# Patient Record
Sex: MALE | Race: WHITE | Employment: OTHER | URBAN - METROPOLITAN AREA
[De-identification: names, ages, dates, MRNs, and addresses within clinical notes are randomized per-mention and may not be internally consistent; named-entity substitution may affect disease eponyms.]

---

## 2017-04-08 ENCOUNTER — APPOINTMENT (OUTPATIENT)
Dept: GENERAL RADIOLOGY | Age: 62
End: 2017-04-08
Payer: MEDICARE

## 2017-04-08 ENCOUNTER — HOSPITAL ENCOUNTER (EMERGENCY)
Age: 62
Discharge: HOME | End: 2017-04-08
Attending: EMERGENCY MEDICINE
Payer: MEDICARE

## 2017-04-08 VITALS
HEART RATE: 77 BPM | SYSTOLIC BLOOD PRESSURE: 147 MMHG | WEIGHT: 137 LBS | TEMPERATURE: 97.9 F | DIASTOLIC BLOOD PRESSURE: 86 MMHG | OXYGEN SATURATION: 95 % | RESPIRATION RATE: 18 BRPM | BODY MASS INDEX: 22.11 KG/M2

## 2017-04-08 DIAGNOSIS — K59.00 CONSTIPATION, UNSPECIFIED CONSTIPATION TYPE: ICD-10-CM

## 2017-04-08 DIAGNOSIS — J40 BRONCHITIS: Primary | ICD-10-CM

## 2017-04-08 LAB
ALBUMIN SERPL-MCNC: 3.8 G/DL (ref 3.9–4.9)
ALP BLD-CCNC: 97 U/L (ref 35–104)
ALT SERPL-CCNC: 12 U/L (ref 0–41)
ANION GAP SERPL CALCULATED.3IONS-SCNC: 11 MEQ/L (ref 7–13)
AST SERPL-CCNC: 17 U/L (ref 0–40)
BASOPHILS ABSOLUTE: 0.1 K/UL (ref 0–0.2)
BASOPHILS RELATIVE PERCENT: 0.8 %
BILIRUB SERPL-MCNC: 0.2 MG/DL (ref 0–1.2)
BUN BLDV-MCNC: 7 MG/DL (ref 8–23)
CALCIUM SERPL-MCNC: 8.8 MG/DL (ref 8.6–10.2)
CHLORIDE BLD-SCNC: 93 MEQ/L (ref 98–107)
CO2: 26 MEQ/L (ref 22–29)
CREAT SERPL-MCNC: 0.81 MG/DL (ref 0.7–1.2)
EOSINOPHILS ABSOLUTE: 0.1 K/UL (ref 0–0.7)
EOSINOPHILS RELATIVE PERCENT: 0.7 %
GFR AFRICAN AMERICAN: >60
GFR NON-AFRICAN AMERICAN: >60
GLOBULIN: 2.5 G/DL (ref 2.3–3.5)
GLUCOSE BLD-MCNC: 90 MG/DL (ref 74–109)
HCT VFR BLD CALC: 47.4 % (ref 42–52)
HEMOGLOBIN: 16.4 G/DL (ref 14–18)
LYMPHOCYTES ABSOLUTE: 1.3 K/UL (ref 1–4.8)
LYMPHOCYTES RELATIVE PERCENT: 16.4 %
MCH RBC QN AUTO: 31.1 PG (ref 27–31.3)
MCHC RBC AUTO-ENTMCNC: 34.6 % (ref 33–37)
MCV RBC AUTO: 90 FL (ref 80–100)
MONOCYTES ABSOLUTE: 1.1 K/UL (ref 0.2–0.8)
MONOCYTES RELATIVE PERCENT: 13.3 %
NEUTROPHILS ABSOLUTE: 5.6 K/UL (ref 1.4–6.5)
NEUTROPHILS RELATIVE PERCENT: 68.8 %
PDW BLD-RTO: 13.4 % (ref 11.5–14.5)
PLATELET # BLD: 139 K/UL (ref 130–400)
POTASSIUM SERPL-SCNC: 3.9 MEQ/L (ref 3.5–5.1)
RBC # BLD: 5.26 M/UL (ref 4.7–6.1)
SODIUM BLD-SCNC: 130 MEQ/L (ref 132–144)
TOTAL PROTEIN: 6.3 G/DL (ref 6.4–8.1)
TROPONIN: <0.01 NG/ML (ref 0–0.01)
WBC # BLD: 8.1 K/UL (ref 4.8–10.8)

## 2017-04-08 PROCEDURE — 96372 THER/PROPH/DIAG INJ SC/IM: CPT

## 2017-04-08 PROCEDURE — 36415 COLL VENOUS BLD VENIPUNCTURE: CPT

## 2017-04-08 PROCEDURE — 85025 COMPLETE CBC W/AUTO DIFF WBC: CPT

## 2017-04-08 PROCEDURE — 99285 EMERGENCY DEPT VISIT HI MDM: CPT

## 2017-04-08 PROCEDURE — 93005 ELECTROCARDIOGRAM TRACING: CPT

## 2017-04-08 PROCEDURE — 6370000000 HC RX 637 (ALT 250 FOR IP): Performed by: EMERGENCY MEDICINE

## 2017-04-08 PROCEDURE — 6360000002 HC RX W HCPCS: Performed by: EMERGENCY MEDICINE

## 2017-04-08 PROCEDURE — 71020 XR CHEST STANDARD TWO VW: CPT

## 2017-04-08 PROCEDURE — 94640 AIRWAY INHALATION TREATMENT: CPT

## 2017-04-08 PROCEDURE — 84484 ASSAY OF TROPONIN QUANT: CPT

## 2017-04-08 PROCEDURE — 80053 COMPREHEN METABOLIC PANEL: CPT

## 2017-04-08 PROCEDURE — 87040 BLOOD CULTURE FOR BACTERIA: CPT

## 2017-04-08 RX ORDER — DICYCLOMINE HCL 20 MG
20 TABLET ORAL EVERY 6 HOURS
Qty: 20 TABLET | Refills: 0 | Status: SHIPPED | OUTPATIENT
Start: 2017-04-08 | End: 2019-12-31

## 2017-04-08 RX ORDER — AZITHROMYCIN 250 MG/1
TABLET, FILM COATED ORAL
Qty: 1 PACKET | Refills: 0 | Status: SHIPPED | OUTPATIENT
Start: 2017-04-08 | End: 2017-04-18

## 2017-04-08 RX ORDER — DICYCLOMINE HYDROCHLORIDE 10 MG/ML
20 INJECTION INTRAMUSCULAR ONCE
Status: COMPLETED | OUTPATIENT
Start: 2017-04-08 | End: 2017-04-08

## 2017-04-08 RX ORDER — ALBUTEROL SULFATE 90 UG/1
1-2 AEROSOL, METERED RESPIRATORY (INHALATION) EVERY 4 HOURS PRN
Qty: 1 INHALER | Refills: 0 | Status: ON HOLD | OUTPATIENT
Start: 2017-04-08 | End: 2021-04-13

## 2017-04-08 RX ORDER — PREDNISONE 20 MG/1
60 TABLET ORAL ONCE
Status: COMPLETED | OUTPATIENT
Start: 2017-04-08 | End: 2017-04-08

## 2017-04-08 RX ADMIN — MAGESIUM CITRATE 296 ML: 1.75 LIQUID ORAL at 14:11

## 2017-04-08 RX ADMIN — PREDNISONE 60 MG: 20 TABLET ORAL at 13:06

## 2017-04-08 RX ADMIN — DICYCLOMINE HYDROCHLORIDE 20 MG: 20 INJECTION, SOLUTION INTRAMUSCULAR at 14:11

## 2017-04-08 RX ADMIN — ALBUTEROL SULFATE 2.5 MG: 2.5 SOLUTION RESPIRATORY (INHALATION) at 12:31

## 2017-04-08 ASSESSMENT — PAIN DESCRIPTION - LOCATION: LOCATION: ABDOMEN;CHEST

## 2017-04-08 ASSESSMENT — ENCOUNTER SYMPTOMS
EYE PAIN: 0
VOMITING: 0
STRIDOR: 0
ANAL BLEEDING: 0
DIARRHEA: 0
COUGH: 0
EYE REDNESS: 0
EYE ITCHING: 0
BACK PAIN: 0
ABDOMINAL DISTENTION: 0
WHEEZING: 0
TROUBLE SWALLOWING: 0
CHEST TIGHTNESS: 1
RHINORRHEA: 0
SORE THROAT: 0
CHOKING: 0
PHOTOPHOBIA: 0
CONSTIPATION: 1
VOICE CHANGE: 0
NAUSEA: 0
SINUS PRESSURE: 0
SHORTNESS OF BREATH: 1
EYE DISCHARGE: 0
COLOR CHANGE: 0
BLOOD IN STOOL: 0
ABDOMINAL PAIN: 1
FACIAL SWELLING: 0

## 2017-04-08 ASSESSMENT — PAIN DESCRIPTION - ORIENTATION: ORIENTATION: UPPER

## 2017-04-08 ASSESSMENT — PAIN SCALES - GENERAL: PAINLEVEL_OUTOF10: 5

## 2017-04-12 LAB
EKG ATRIAL RATE: 70 BPM
EKG P AXIS: 63 DEGREES
EKG P-R INTERVAL: 136 MS
EKG Q-T INTERVAL: 384 MS
EKG QRS DURATION: 80 MS
EKG QTC CALCULATION (BAZETT): 414 MS
EKG R AXIS: 31 DEGREES
EKG T AXIS: 59 DEGREES
EKG VENTRICULAR RATE: 70 BPM

## 2017-04-13 LAB
BLOOD CULTURE, ROUTINE: NORMAL
CULTURE, BLOOD 2: NORMAL

## 2019-12-31 ENCOUNTER — APPOINTMENT (OUTPATIENT)
Dept: CT IMAGING | Age: 64
End: 2019-12-31
Payer: MEDICARE

## 2019-12-31 ENCOUNTER — HOSPITAL ENCOUNTER (EMERGENCY)
Age: 64
Discharge: HOME | End: 2019-12-31
Attending: NEUROMUSCULOSKELETAL MEDICINE, SPORTS MEDICINE
Payer: MEDICARE

## 2019-12-31 VITALS
BODY MASS INDEX: 20.89 KG/M2 | HEART RATE: 57 BPM | WEIGHT: 130 LBS | SYSTOLIC BLOOD PRESSURE: 165 MMHG | DIASTOLIC BLOOD PRESSURE: 74 MMHG | OXYGEN SATURATION: 98 % | HEIGHT: 66 IN | RESPIRATION RATE: 18 BRPM | TEMPERATURE: 97.4 F

## 2019-12-31 LAB
ALBUMIN SERPL-MCNC: 4.8 G/DL (ref 3.5–4.6)
ALP BLD-CCNC: 131 U/L (ref 35–104)
ALT SERPL-CCNC: 22 U/L (ref 0–41)
AMYLASE: 71 U/L (ref 22–93)
ANION GAP SERPL CALCULATED.3IONS-SCNC: 19 MEQ/L (ref 9–15)
AST SERPL-CCNC: 26 U/L (ref 0–40)
BASOPHILS ABSOLUTE: 0.1 K/UL (ref 0–0.2)
BASOPHILS RELATIVE PERCENT: 0.7 %
BILIRUB SERPL-MCNC: 0.3 MG/DL (ref 0.2–0.7)
BILIRUBIN URINE: NEGATIVE
BLOOD, URINE: NEGATIVE
BUN BLDV-MCNC: 12 MG/DL (ref 8–23)
CALCIUM SERPL-MCNC: 9.5 MG/DL (ref 8.5–9.9)
CHLORIDE BLD-SCNC: 93 MEQ/L (ref 95–107)
CLARITY: CLEAR
CO2: 25 MEQ/L (ref 20–31)
COLOR: YELLOW
CREAT SERPL-MCNC: 0.89 MG/DL (ref 0.7–1.2)
EOSINOPHILS ABSOLUTE: 0.4 K/UL (ref 0–0.7)
EOSINOPHILS RELATIVE PERCENT: 4.1 %
GFR AFRICAN AMERICAN: >60
GFR NON-AFRICAN AMERICAN: >60
GLOBULIN: 3.1 G/DL (ref 2.3–3.5)
GLUCOSE BLD-MCNC: 83 MG/DL (ref 70–99)
GLUCOSE URINE: NEGATIVE MG/DL
HCT VFR BLD CALC: 53.4 % (ref 42–52)
HEMOGLOBIN: 18.5 G/DL (ref 14–18)
KETONES, URINE: NEGATIVE MG/DL
LEUKOCYTE ESTERASE, URINE: NEGATIVE
LIPASE: 20 U/L (ref 12–95)
LYMPHOCYTES ABSOLUTE: 3.1 K/UL (ref 1–4.8)
LYMPHOCYTES RELATIVE PERCENT: 32.5 %
MCH RBC QN AUTO: 32.5 PG (ref 27–31.3)
MCHC RBC AUTO-ENTMCNC: 34.6 % (ref 33–37)
MCV RBC AUTO: 94.1 FL (ref 80–100)
MONOCYTES ABSOLUTE: 0.9 K/UL (ref 0.2–0.8)
MONOCYTES RELATIVE PERCENT: 9.3 %
NEUTROPHILS ABSOLUTE: 5.1 K/UL (ref 1.4–6.5)
NEUTROPHILS RELATIVE PERCENT: 53.4 %
NITRITE, URINE: NEGATIVE
PDW BLD-RTO: 13.7 % (ref 11.5–14.5)
PH UA: 7 (ref 5–9)
PLATELET # BLD: 274 K/UL (ref 130–400)
POTASSIUM SERPL-SCNC: 4 MEQ/L (ref 3.4–4.9)
PROTEIN UA: NEGATIVE MG/DL
RBC # BLD: 5.68 M/UL (ref 4.7–6.1)
SODIUM BLD-SCNC: 137 MEQ/L (ref 135–144)
SPECIFIC GRAVITY UA: 1.01 (ref 1–1.03)
TOTAL PROTEIN: 7.9 G/DL (ref 6.3–8)
URINE REFLEX TO CULTURE: NORMAL
UROBILINOGEN, URINE: 0.2 E.U./DL
WBC # BLD: 9.6 K/UL (ref 4.8–10.8)

## 2019-12-31 PROCEDURE — 82150 ASSAY OF AMYLASE: CPT

## 2019-12-31 PROCEDURE — 96375 TX/PRO/DX INJ NEW DRUG ADDON: CPT

## 2019-12-31 PROCEDURE — 74176 CT ABD & PELVIS W/O CONTRAST: CPT

## 2019-12-31 PROCEDURE — 81003 URINALYSIS AUTO W/O SCOPE: CPT

## 2019-12-31 PROCEDURE — 6360000002 HC RX W HCPCS: Performed by: NEUROMUSCULOSKELETAL MEDICINE, SPORTS MEDICINE

## 2019-12-31 PROCEDURE — 99284 EMERGENCY DEPT VISIT MOD MDM: CPT

## 2019-12-31 PROCEDURE — 85025 COMPLETE CBC W/AUTO DIFF WBC: CPT

## 2019-12-31 PROCEDURE — 83690 ASSAY OF LIPASE: CPT

## 2019-12-31 PROCEDURE — 36415 COLL VENOUS BLD VENIPUNCTURE: CPT

## 2019-12-31 PROCEDURE — 80053 COMPREHEN METABOLIC PANEL: CPT

## 2019-12-31 PROCEDURE — 2580000003 HC RX 258: Performed by: NEUROMUSCULOSKELETAL MEDICINE, SPORTS MEDICINE

## 2019-12-31 PROCEDURE — 96374 THER/PROPH/DIAG INJ IV PUSH: CPT

## 2019-12-31 RX ORDER — MORPHINE SULFATE 2 MG/ML
2 INJECTION, SOLUTION INTRAMUSCULAR; INTRAVENOUS ONCE
Status: COMPLETED | OUTPATIENT
Start: 2019-12-31 | End: 2019-12-31

## 2019-12-31 RX ORDER — 0.9 % SODIUM CHLORIDE 0.9 %
500 INTRAVENOUS SOLUTION INTRAVENOUS ONCE
Status: COMPLETED | OUTPATIENT
Start: 2019-12-31 | End: 2019-12-31

## 2019-12-31 RX ORDER — ONDANSETRON 2 MG/ML
4 INJECTION INTRAMUSCULAR; INTRAVENOUS ONCE
Status: COMPLETED | OUTPATIENT
Start: 2019-12-31 | End: 2019-12-31

## 2019-12-31 RX ORDER — KETOROLAC TROMETHAMINE 30 MG/ML
30 INJECTION, SOLUTION INTRAMUSCULAR; INTRAVENOUS ONCE
Status: COMPLETED | OUTPATIENT
Start: 2019-12-31 | End: 2019-12-31

## 2019-12-31 RX ORDER — GLYCOPYRROLATE 2 MG/1
TABLET ORAL
Qty: 20 TABLET | Refills: 0 | Status: SHIPPED | OUTPATIENT
Start: 2019-12-31 | End: 2021-04-06

## 2019-12-31 RX ADMIN — SODIUM CHLORIDE 500 ML: 9 INJECTION, SOLUTION INTRAVENOUS at 07:42

## 2019-12-31 RX ADMIN — KETOROLAC TROMETHAMINE 30 MG: 30 INJECTION, SOLUTION INTRAMUSCULAR at 06:24

## 2019-12-31 RX ADMIN — ONDANSETRON 4 MG: 2 INJECTION INTRAMUSCULAR; INTRAVENOUS at 06:24

## 2019-12-31 RX ADMIN — MORPHINE SULFATE 2 MG: 2 INJECTION, SOLUTION INTRAMUSCULAR; INTRAVENOUS at 06:24

## 2019-12-31 ASSESSMENT — PAIN DESCRIPTION - ORIENTATION
ORIENTATION: RIGHT
ORIENTATION: RIGHT

## 2019-12-31 ASSESSMENT — PAIN DESCRIPTION - DESCRIPTORS: DESCRIPTORS: ACHING

## 2019-12-31 ASSESSMENT — PAIN DESCRIPTION - PAIN TYPE
TYPE: ACUTE PAIN

## 2019-12-31 ASSESSMENT — PAIN DESCRIPTION - FREQUENCY: FREQUENCY: CONTINUOUS

## 2019-12-31 ASSESSMENT — PAIN DESCRIPTION - LOCATION
LOCATION: ABDOMEN;FLANK
LOCATION: ABDOMEN
LOCATION: ABDOMEN

## 2019-12-31 ASSESSMENT — PAIN SCALES - GENERAL
PAINLEVEL_OUTOF10: 7
PAINLEVEL_OUTOF10: 0
PAINLEVEL_OUTOF10: 7

## 2019-12-31 NOTE — ED PROVIDER NOTES
[address] [facility] ED  eMERGENCYdEPARTMENT eNCOUnter      Pt Name: Tarsha Gardner  MRN: [id number]  [place] 1955  Date of evaluation: 12/31/2019  Provider:DOMONIQUE SESAY MD    CHIEF COMPLAINT           HPI  Tarsha Gardner is a 71 y.o. male per chart review has a h/o of upper abdominal pain since eating pork chops at 8 Pm with severe pain with nausea since then. Patient has experience similar episodes this entire week, preventing him from sleeping well. Patient describes the pain as severe as when he had his kidney stones. ROS  Review of Systems   Constitutional: Negative for appetite change, chills, diaphoresis, fatigue and fever. HENT: Negative for congestion, ear pain, sinus pain and sore throat. Eyes: Negative for pain, discharge and redness. Respiratory: Negative for cough, shortness of breath and wheezing. Cardiovascular: Negative for chest pain and palpitations. Gastrointestinal: Positive for abdominal pain and nausea. Negative for diarrhea and vomiting. Pain in the upper abdomenal area 4 hours after eating pork chops. Genitourinary: Negative for dysuria, flank pain and hematuria. Musculoskeletal: Negative for arthralgias and myalgias. Skin: Negative for rash and wound. Neurological: Negative for dizziness, syncope, light-headedness and headaches. All other systems reviewed and are negative. Except as noted above the remainder of the review of systems was reviewed and negative. PAST MEDICAL HISTORY     Past Medical History:   Diagnosis Date    Back pain     chronic    Epilepsy ([facility] [address]     Shingles          SURGICAL HISTORY     History reviewed. No pertinent surgical history.       CURRENTMEDICATIONS       Previous Medications    ALBUTEROL SULFATE HFA (PROVENTIL HFA) 108 (90 BASE) MCG/ACT INHALER    Inhale 1-2 puffs into the lungs every 4 hours as needed for Wheezing or Shortness of Breath (Space out to every 6 hours as symptoms improve) Space out to every 6 hours as symptoms improve. DICYCLOMINE (BENTYL) 20 MG TABLET    Take 1 tablet by mouth every 6 hours    IBUPROFEN (ADVIL;MOTRIN) 800 MG TABLET    Take 1 tablet by mouth every 8 hours as needed for Pain or Fever    PHENOBARBITAL (LUMINAL) 32.4 MG TABLET    Take 129.6 mg by mouth 2 times daily       ALLERGIES     Patient has no known allergies. FAMILY HISTORY     History reviewed. No pertinent family history.        SOCIAL HISTORY       Social History     Socioeconomic History    Marital status:      Spouse name: None    Number of children: None    Years of education: None    Highest education level: None   Occupational History    None   Social Needs    Financial resource strain: None    Food insecurity:     Worry: None     Inability: None    Transportation needs:     Medical: None     Non-medical: None   Tobacco Use    Smoking status: Current Every Day Smoker     Packs/day: 1.00     Years: 10.00     Pack years: 10.00     Types: Cigarettes    Smokeless tobacco: Never Used   Substance and Sexual Activity    Alcohol use: No     Comment: 3-4 beers per night    Drug use: Never    Sexual activity: None   Lifestyle    Physical activity:     Days per week: None     Minutes per session: None    Stress: None   Relationships    Social connections:     Talks on phone: None     Gets together: None     Attends Confucianist service: None     Active member of club or organization: None     Attends meetings of clubs or organizations: None     Relationship status: None    Intimate partner violence:     Fear of current or ex partner: None     Emotionally abused: None     Physically abused: None     Forced sexual activity: None   Other Topics Concern    None   Social History Narrative    None         PHYSICAL EXAM       ED Triage Vitals [12/31/19 0519]   BP Temp Temp Source Pulse Resp SpO2 Height Weight   (!) 188/99 97.4 °F (36.3 °C) Oral 74 16 97 % 5' 6\" (1.676 m) 130 lb (59 kg)       Physical Exam  Vitals signs and nursing note reviewed. Constitutional:       General: He is not in acute distress. Appearance: He is normal weight. He is not ill-appearing, toxic-appearing or diaphoretic. HENT:      Head: Normocephalic and atraumatic. Mouth/Throat:      Mouth: Mucous membranes are moist.   Eyes:      Extraocular Movements: Extraocular movements intact. Conjunctiva/sclera: Conjunctivae normal.      Pupils: Pupils are equal, round, and reactive to light. Neck:      Musculoskeletal: Neck supple. Cardiovascular:      Rate and Rhythm: Normal rate and regular rhythm. Pulses: Normal pulses. Heart sounds: Normal heart sounds. Pulmonary:      Effort: Pulmonary effort is normal. No respiratory distress. Breath sounds: Normal breath sounds. Abdominal:      General: Abdomen is flat. Bowel sounds are normal.      Palpations: Abdomen is soft. Tenderness: There is tenderness. Comments: Tenderness in the right upper quadrant and upper abdominal area. Skin:     General: Skin is warm and dry. Neurological:      General: No focal deficit present. Mental Status: He is alert. MDM  Saline lock and labs drawn. Patient medicated for pain. Toradol 30 mg IVP. Morphine 2 mg IVP. Zofran 4 mg IVP. One liter of normal saline infused. Patient sent to CT scan. Suspect possible gallbladder issue. Case signed out to Day doctor. Dr. Evie Sierra at 0700. FINAL IMPRESSION      1.  Abdominal pain, epigastric          DISPOSITION/PLAN   DISPOSITION          DISCHARGE MEDICATIONS:  Shruthi Ponce MD(electronically signed)  Attending Emergency Physician            Lorena Lebron MD  12/31/19 1007

## 2019-12-31 NOTE — ED TRIAGE NOTES
Pt c/o right sided abd pain and right flank pain x 2-3 weeks. Pt states he has had kidney stones in the past and it feels similar. Pt states he is unable to sleep due to the pain. Pt states he had shingles in the area of his abd pain 2 weeks ago and he is concerned the 2 issues may be related.   Pt states he is constipated, last BM was yesterday after taking Ex-Lax

## 2021-01-19 ENCOUNTER — HOSPITAL ENCOUNTER (EMERGENCY)
Age: 66
Discharge: HOME | End: 2021-01-20
Payer: MEDICARE

## 2021-01-19 DIAGNOSIS — R10.9 ABDOMINAL PAIN, UNSPECIFIED ABDOMINAL LOCATION: Primary | ICD-10-CM

## 2021-01-19 DIAGNOSIS — R19.7 NAUSEA VOMITING AND DIARRHEA: ICD-10-CM

## 2021-01-19 DIAGNOSIS — R11.2 NAUSEA VOMITING AND DIARRHEA: ICD-10-CM

## 2021-01-19 DIAGNOSIS — K80.80 BILIARY CALCULUS OF OTHER SITE WITHOUT OBSTRUCTION: ICD-10-CM

## 2021-01-19 LAB
BASOPHILS ABSOLUTE: 0 K/UL (ref 0–0.2)
BASOPHILS RELATIVE PERCENT: 0.4 %
EOSINOPHILS ABSOLUTE: 0.1 K/UL (ref 0–0.7)
EOSINOPHILS RELATIVE PERCENT: 0.6 %
HCT VFR BLD CALC: 44.9 % (ref 42–52)
HEMOGLOBIN: 15.2 G/DL (ref 14–18)
LYMPHOCYTES ABSOLUTE: 1.1 K/UL (ref 1–4.8)
LYMPHOCYTES RELATIVE PERCENT: 10.4 %
MCH RBC QN AUTO: 30.7 PG (ref 27–31.3)
MCHC RBC AUTO-ENTMCNC: 34 % (ref 33–37)
MCV RBC AUTO: 90.4 FL (ref 80–100)
MONOCYTES ABSOLUTE: 0.6 K/UL (ref 0.2–0.8)
MONOCYTES RELATIVE PERCENT: 5.9 %
NEUTROPHILS ABSOLUTE: 8.7 K/UL (ref 1.4–6.5)
NEUTROPHILS RELATIVE PERCENT: 82.7 %
PDW BLD-RTO: 13.7 % (ref 11.5–14.5)
PLATELET # BLD: 263 K/UL (ref 130–400)
RBC # BLD: 4.97 M/UL (ref 4.7–6.1)
WBC # BLD: 10.6 K/UL (ref 4.8–10.8)

## 2021-01-19 PROCEDURE — 96374 THER/PROPH/DIAG INJ IV PUSH: CPT

## 2021-01-19 PROCEDURE — 81003 URINALYSIS AUTO W/O SCOPE: CPT

## 2021-01-19 PROCEDURE — 85025 COMPLETE CBC W/AUTO DIFF WBC: CPT

## 2021-01-19 PROCEDURE — 83735 ASSAY OF MAGNESIUM: CPT

## 2021-01-19 PROCEDURE — 99284 EMERGENCY DEPT VISIT MOD MDM: CPT

## 2021-01-19 PROCEDURE — 83690 ASSAY OF LIPASE: CPT

## 2021-01-19 PROCEDURE — 36415 COLL VENOUS BLD VENIPUNCTURE: CPT

## 2021-01-19 PROCEDURE — 6360000002 HC RX W HCPCS: Performed by: STUDENT IN AN ORGANIZED HEALTH CARE EDUCATION/TRAINING PROGRAM

## 2021-01-19 PROCEDURE — 83605 ASSAY OF LACTIC ACID: CPT

## 2021-01-19 PROCEDURE — 96375 TX/PRO/DX INJ NEW DRUG ADDON: CPT

## 2021-01-19 PROCEDURE — 85610 PROTHROMBIN TIME: CPT

## 2021-01-19 PROCEDURE — 80053 COMPREHEN METABOLIC PANEL: CPT

## 2021-01-19 PROCEDURE — 2580000003 HC RX 258: Performed by: STUDENT IN AN ORGANIZED HEALTH CARE EDUCATION/TRAINING PROGRAM

## 2021-01-19 PROCEDURE — 85730 THROMBOPLASTIN TIME PARTIAL: CPT

## 2021-01-19 RX ORDER — ONDANSETRON 2 MG/ML
4 INJECTION INTRAMUSCULAR; INTRAVENOUS ONCE
Status: COMPLETED | OUTPATIENT
Start: 2021-01-19 | End: 2021-01-19

## 2021-01-19 RX ORDER — MORPHINE SULFATE 2 MG/ML
4 INJECTION, SOLUTION INTRAMUSCULAR; INTRAVENOUS
Status: DISCONTINUED | OUTPATIENT
Start: 2021-01-19 | End: 2021-01-20 | Stop reason: HOSPADM

## 2021-01-19 RX ORDER — 0.9 % SODIUM CHLORIDE 0.9 %
1000 INTRAVENOUS SOLUTION INTRAVENOUS ONCE
Status: COMPLETED | OUTPATIENT
Start: 2021-01-19 | End: 2021-01-20

## 2021-01-19 RX ORDER — CLOPIDOGREL BISULFATE 75 MG/1
75 TABLET ORAL DAILY
Status: ON HOLD | COMMUNITY
End: 2022-07-03 | Stop reason: HOSPADM

## 2021-01-19 RX ORDER — LISINOPRIL 10 MG/1
TABLET ORAL DAILY
COMMUNITY

## 2021-01-19 RX ADMIN — SODIUM CHLORIDE 1000 ML: 9 INJECTION, SOLUTION INTRAVENOUS at 23:33

## 2021-01-19 RX ADMIN — ONDANSETRON 4 MG: 2 INJECTION INTRAMUSCULAR; INTRAVENOUS at 23:33

## 2021-01-19 RX ADMIN — MORPHINE SULFATE 4 MG: 2 INJECTION, SOLUTION INTRAMUSCULAR; INTRAVENOUS at 23:33

## 2021-01-19 ASSESSMENT — PAIN DESCRIPTION - ORIENTATION: ORIENTATION: UPPER;RIGHT;LEFT

## 2021-01-19 ASSESSMENT — PAIN SCALES - GENERAL
PAINLEVEL_OUTOF10: 8
PAINLEVEL_OUTOF10: 2

## 2021-01-19 ASSESSMENT — PAIN DESCRIPTION - DESCRIPTORS: DESCRIPTORS: CRAMPING

## 2021-01-20 ENCOUNTER — APPOINTMENT (OUTPATIENT)
Dept: CT IMAGING | Age: 66
End: 2021-01-20
Payer: MEDICARE

## 2021-01-20 VITALS
SYSTOLIC BLOOD PRESSURE: 157 MMHG | RESPIRATION RATE: 18 BRPM | TEMPERATURE: 97.5 F | OXYGEN SATURATION: 99 % | HEART RATE: 67 BPM | DIASTOLIC BLOOD PRESSURE: 63 MMHG | WEIGHT: 175 LBS | HEIGHT: 65 IN | BODY MASS INDEX: 29.16 KG/M2

## 2021-01-20 LAB
ALBUMIN SERPL-MCNC: 4.4 G/DL (ref 3.5–4.6)
ALP BLD-CCNC: 167 U/L (ref 35–104)
ALT SERPL-CCNC: 25 U/L (ref 0–41)
ANION GAP SERPL CALCULATED.3IONS-SCNC: 12 MEQ/L (ref 9–15)
APTT: 38.8 SEC (ref 24.4–36.8)
AST SERPL-CCNC: 24 U/L (ref 0–40)
BILIRUB SERPL-MCNC: 0.3 MG/DL (ref 0.2–0.7)
BILIRUBIN URINE: NEGATIVE
BLOOD, URINE: NEGATIVE
BUN BLDV-MCNC: 17 MG/DL (ref 8–23)
CALCIUM SERPL-MCNC: 9.2 MG/DL (ref 8.5–9.9)
CHLORIDE BLD-SCNC: 89 MEQ/L (ref 95–107)
CLARITY: CLEAR
CO2: 26 MEQ/L (ref 20–31)
COLOR: YELLOW
CREAT SERPL-MCNC: 0.74 MG/DL (ref 0.7–1.2)
GFR AFRICAN AMERICAN: >60
GFR NON-AFRICAN AMERICAN: >60
GLOBULIN: 2.7 G/DL (ref 2.3–3.5)
GLUCOSE BLD-MCNC: 132 MG/DL (ref 70–99)
GLUCOSE URINE: NEGATIVE MG/DL
INR BLD: 0.9
KETONES, URINE: NEGATIVE MG/DL
LACTIC ACID: 0.9 MMOL/L (ref 0.5–2.2)
LEUKOCYTE ESTERASE, URINE: NEGATIVE
LIPASE: 15 U/L (ref 12–95)
MAGNESIUM: 1.9 MG/DL (ref 1.7–2.4)
NITRITE, URINE: NEGATIVE
PH UA: 6.5 (ref 5–9)
POTASSIUM SERPL-SCNC: 3.7 MEQ/L (ref 3.4–4.9)
PROTEIN UA: NEGATIVE MG/DL
PROTHROMBIN TIME: 12.5 SEC (ref 12.3–14.9)
SODIUM BLD-SCNC: 127 MEQ/L (ref 135–144)
SPECIFIC GRAVITY UA: 1.01 (ref 1–1.03)
TOTAL PROTEIN: 7.1 G/DL (ref 6.3–8)
URINE REFLEX TO CULTURE: NORMAL
UROBILINOGEN, URINE: 0.2 E.U./DL

## 2021-01-20 PROCEDURE — 74177 CT ABD & PELVIS W/CONTRAST: CPT

## 2021-01-20 PROCEDURE — 6360000004 HC RX CONTRAST MEDICATION: Performed by: STUDENT IN AN ORGANIZED HEALTH CARE EDUCATION/TRAINING PROGRAM

## 2021-01-20 RX ORDER — ONDANSETRON 4 MG/1
4 TABLET, ORALLY DISINTEGRATING ORAL EVERY 8 HOURS PRN
Qty: 12 TABLET | Refills: 0 | Status: SHIPPED | OUTPATIENT
Start: 2021-01-20 | End: 2021-01-24

## 2021-01-20 RX ORDER — TRAMADOL HYDROCHLORIDE 50 MG/1
50 TABLET ORAL EVERY 4 HOURS PRN
Qty: 18 TABLET | Refills: 0 | Status: SHIPPED | OUTPATIENT
Start: 2021-01-20 | End: 2021-01-23

## 2021-01-20 RX ADMIN — IOPAMIDOL 100 ML: 612 INJECTION, SOLUTION INTRAVENOUS at 00:56

## 2021-01-20 NOTE — ED NOTES
Discharge education reviewed verbally and in writing. Patient instructed to follow up with PCP and come back to the ED with any new or worsening symptoms. No questions or concerns at this time. No s/s of distress noted at this time.         Екатерина Taylor RN  01/20/21 0265

## 2021-01-20 NOTE — ED NOTES
Pt arrived with complaint of abdominal pain. Pt reports the pain is located RLQ, LLQ, and epigastricarea. Pt describes the pain as \"aggervating\" pain. Pt reports the pain is intermittent. Pt reports the pain started today. Pt reports they have taken Pepto and Tums. Pt reports last bowel movement was today. Pt rates the pain 8/10. On a pain scale 1-10, 10 being the highest. Pt reports laying down increases the pain. Pt reports nothing  decreases the pain. Pt denies shortness of breath, weakness, fatigue, fever, cold sweats, dizziness, vomiting or nausea. Pt reports a history of gall stones. Pt is A & O x 4.          Penelope Sharif, JACY  01/19/21 1920

## 2021-01-20 NOTE — ED NOTES
Pt in bed with eyes closed. Respirations are even and unlabored. No s/s of distress noted at this time. Will continue to monitor.         Rishabh Calabrese, JACY  01/20/21 5988

## 2021-01-20 NOTE — ED TRIAGE NOTES
Patient presents with complaints of upper, bilateral quadrant abdominal pain x a couple hours, states cramping began after he ate chicken wings for lunch. Patient states he vomited once and diarrhea several times. No distress noted in triage. Patient states a history of gall stones.

## 2021-01-20 NOTE — ED NOTES
Pt educated urine specimen is needed. Pt reports he will void \"soon. \"     Alfredito Aguillon RN  01/19/21 8000

## 2021-01-20 NOTE — ED NOTES
Pt awake and alert. No s/s of distress noted at this time. Pt denies needs at this time. Will continue to monitor.         Kristi Willams RN  01/19/21 6494

## 2021-01-21 ASSESSMENT — ENCOUNTER SYMPTOMS
COUGH: 0
ANAL BLEEDING: 0
PHOTOPHOBIA: 0
WHEEZING: 0
CONSTIPATION: 0
VOMITING: 1
NAUSEA: 1
ABDOMINAL DISTENTION: 0
ABDOMINAL PAIN: 1
SHORTNESS OF BREATH: 0
DIARRHEA: 1
BLOOD IN STOOL: 0

## 2021-01-21 NOTE — ED PROVIDER NOTES
[address] [facility] ED  EMERGENCY DEPARTMENT ENCOUNTER      Pt Name: Una Gonzalez  MRN: [id number]  [place] 1955  Date of evaluation: 1/19/2021  Provider: Octavio Pettit Dr       Chief Complaint   Patient presents with    Abdominal Pain     abd pain since lunchtime         HISTORY OF PRESENT ILLNESS   (Location/Symptom, Timing/Onset, Context/Setting, Quality, Duration, Modifying Factors, Severity)  Note limiting factors. Una Gonzalez is a 76 y.o. male who per chart review has no pmhx presents to the emergency department with gradual onset, intermittent, moderate, bilateral upper quadrant abdominal pain that began earlier this evening after eating chicken wings. Described as cramping. Has not tried anything for sx. Denies aggravating and alleviating factors. Pt states he has a known history of cholelithiasis. + for nausea and several episodes of non bloody diarrhea. He states he vomited once when he arrived to the ED. He denies fever chills abd distention cp sob constipation urinary sx blood in the stool or the urine. HPI    Nursing Notes were reviewed. REVIEW OF SYSTEMS    (2-9 systems for level 4, 10 or more for level 5)     Review of Systems   Constitutional: Negative for chills and fever. HENT: Negative for congestion. Eyes: Negative for photophobia. Respiratory: Negative for cough, shortness of breath and wheezing. Cardiovascular: Negative for chest pain and palpitations. Gastrointestinal: Positive for abdominal pain, diarrhea, nausea and vomiting. Negative for abdominal distention, anal bleeding, blood in stool and constipation. Genitourinary: Negative for dysuria, frequency and hematuria. Musculoskeletal: Negative for myalgias. Allergic/Immunologic: Negative for immunocompromised state. Neurological: Negative for dizziness, weakness and headaches. All other systems reviewed and are negative.       Except as noted above the remainder of the review of systems was reviewed and negative. PAST MEDICAL HISTORY     Past Medical History:   Diagnosis Date    Back pain     chronic    Epilepsy ([address]     Hyperlipidemia     Hypertension     Shingles          SURGICAL HISTORY       Past Surgical History:   Procedure Laterality Date    PENILE PROSTHESIS           CURRENT MEDICATIONS       Discharge Medication List as of 1/20/2021  2:02 AM      CONTINUE these medications which have NOT CHANGED    Details   clopidogrel (PLAVIX) 75 MG tablet Take 75 mg by mouth dailyHistorical Med      lisinopril (PRINIVIL;ZESTRIL) 10 MG tablet Take by mouth dailyHistorical Med      glycopyrrolate (ROBINUL-FORTE) 2 MG tablet One every 4-6 hours prn abdominal pain, Disp-20 tablet, R-0Print      PHENobarbital (LUMINAL) 32.4 MG tablet Take 129.6 mg by mouth 2 times daily      albuterol sulfate HFA (PROVENTIL HFA) 108 (90 BASE) MCG/ACT inhaler Inhale 1-2 puffs into the lungs every 4 hours as needed for Wheezing or Shortness of Breath (Space out to every 6 hours as symptoms improve) Space out to every 6 hours as symptoms improve., Disp-1 Inhaler, R-0Print      ibuprofen (ADVIL;MOTRIN) 800 MG tablet Take 1 tablet by mouth every 8 hours as needed for Pain or Fever, Disp-20 tablet, R-0             ALLERGIES     Patient has no known allergies. FAMILY HISTORY     History reviewed. No pertinent family history.        SOCIAL HISTORY       Social History     Socioeconomic History    Marital status:      Spouse name: None    Number of children: None    Years of education: None    Highest education level: None   Occupational History    None   Social Needs    Financial resource strain: None    Food insecurity     Worry: None     Inability: None    Transportation needs     Medical: None     Non-medical: None   Tobacco Use    Smoking status: Current Every Day Smoker     Packs/day: 1.00     Years: 10.00     Pack years: 10.00     Types: Cigarettes    Smokeless tobacco: Never Used   Substance and Sexual Activity    Alcohol use: Not Currently    Drug use: Never    Sexual activity: None   Lifestyle    Physical activity     Days per week: None     Minutes per session: None    Stress: None   Relationships    Social connections     Talks on phone: None     Gets together: None     Attends Mormon service: None     Active member of club or organization: None     Attends meetings of clubs or organizations: None     Relationship status: None    Intimate partner violence     Fear of current or ex partner: None     Emotionally abused: None     Physically abused: None     Forced sexual activity: None   Other Topics Concern    None   Social History Narrative    None       SCREENINGS        [place] Coma Scale  Eye Opening: Spontaneous  Best Verbal Response: Oriented  Best Motor Response: Obeys commands  [place] Coma Scale Score: 15               PHYSICAL EXAM    (up to 7 for level 4, 8 or more for level 5)     ED Triage Vitals   BP Temp Temp Source Pulse Resp SpO2 Height Weight   01/19/21 2137 01/19/21 2130 01/19/21 2130 01/19/21 2130 01/19/21 2130 01/19/21 2130 01/19/21 2130 01/19/21 2130   (!) 192/85 97.5 °F (36.4 °C) Oral 78 20 98 % 5' 5\" (1.651 m) 175 lb (79.4 kg)       Physical Exam  Constitutional:       General: He is not in acute distress. Appearance: He is well-developed. He is not ill-appearing, toxic-appearing or diaphoretic. HENT:      Head: Normocephalic and atraumatic. Nose: Nose normal.      Mouth/Throat:      Mouth: Mucous membranes are moist.   Eyes:      Pupils: Pupils are equal, round, and reactive to light. Neck:      Musculoskeletal: Normal range of motion. Cardiovascular:      Rate and Rhythm: Normal rate and regular rhythm. Heart sounds: No murmur. No friction rub. No gallop. Pulmonary:      Effort: Pulmonary effort is normal.      Breath sounds: Normal breath sounds. No wheezing, rhonchi or rales.    Abdominal:      General: There is no distension. Palpations: There is no mass. Tenderness: There is abdominal tenderness in the right upper quadrant and left upper quadrant. There is no right CVA tenderness, left CVA tenderness, guarding or rebound. Negative signs include Coles's sign, Rovsing's sign, McBurney's sign, psoas sign and obturator sign. Hernia: No hernia is present. Musculoskeletal:         General: No swelling. Skin:     General: Skin is warm and dry. Capillary Refill: Capillary refill takes less than 2 seconds. Neurological:      General: No focal deficit present. Mental Status: He is alert and oriented to person, place, and time. DIAGNOSTIC RESULTS     EKG: All EKG's are interpreted by the Emergency Department Physician who either signs or Co-signs this chart in the absence of a cardiologist.        RADIOLOGY:   Non-plain film images such as CT, Ultrasound and MRI are read by the radiologist. Plain radiographic images are visualized and preliminarily interpreted by the emergency physician with the below findings:        Interpretation per the Radiologist below, if available at the time of this note:    RADIOLOGY REPORT   Final Result      CT ABDOMEN PELVIS W IV CONTRAST Additional Contrast? None   Final Result      Cholelithiasis, with pericholecystic fluid, and intrahepatic ductal dilatation. Acute cholecystitis diagnostic consideration. If clinical concern warrants, sonography may be utilized for further evaluation. Nonobstructing left renal calculi. Other findings discussed. All CT scans at this facility use dose modulation, iterative reconstruction, and/or weight based dosing when appropriate to reduce radiation dose to as low as reasonably achievable.                      ED BEDSIDE ULTRASOUND:   Performed by ED Physician - none    LABS:  Labs Reviewed   APTT - Abnormal; Notable for the following components:       Result Value    aPTT 38.8 (*)     All other components within normal limits   COMPREHENSIVE METABOLIC PANEL - Abnormal; Notable for the following components:    Sodium 127 (*)     Chloride 89 (*)     Glucose 132 (*)     Alkaline Phosphatase 167 (*)     All other components within normal limits   CBC WITH AUTO DIFFERENTIAL - Abnormal; Notable for the following components:    Neutrophils Absolute 8.7 (*)     All other components within normal limits   PROTIME-INR   LACTIC ACID, PLASMA   LIPASE   MAGNESIUM   URINE RT REFLEX TO CULTURE       All other labs were within normal range or not returned as of this dictation. EMERGENCY DEPARTMENT COURSE and DIFFERENTIAL DIAGNOSIS/MDM:   Vitals:    Vitals:    01/19/21 2333 01/19/21 2345 01/20/21 0000 01/20/21 0105   BP: (!) 141/55  130/60 (!) 157/63   Pulse: 61  60 67   Resp: 18      Temp:       TempSrc:       SpO2: 98% 97% 96% 99%   Weight:       Height:           MDM     Pt is a 75 yo M who presents to the ED with bilateral upper quadrant abd pain, nausea, diarrhea. He is afebrile and hemodynamically stable. He was given 1 L IV NS, IV morphine, IV zofran in the ED. CMP remarkable for Na 127, Cl 89, alk phos 167. Remainder of labs unremarkable. UA negative for UTI. Per [facility] reading, Ct abd pelvis shows cholelithiasis with mild wall thickening without evidence of acute cholecystitis. Pt is afebrile, no leukocytosis or evidence of biliary obstruction on CMP. Suspect biliary colic. Pt is non toxic appearing with stable vitals. He states he is feeling much better. He is stable for discharge. He was given script for tramadol and zofran. Referred to Dr. Leni Boeck of general surgery for follow up in 1 day. Educated on a low fat diet. Return to the ED for worsening sx, given warning signs for which he should return. Pt understands and agrees to plan, all questions answered. REASSESSMENT          CRITICAL CARE TIME   Total Critical Care time was 0 minutes, excluding separately reportable procedures.   There was a high probability of

## 2021-02-05 ENCOUNTER — OFFICE VISIT (OUTPATIENT)
Dept: SURGERY | Age: 66
End: 2021-02-05
Payer: MEDICARE

## 2021-02-05 VITALS
DIASTOLIC BLOOD PRESSURE: 60 MMHG | HEIGHT: 65 IN | BODY MASS INDEX: 21.66 KG/M2 | WEIGHT: 130 LBS | SYSTOLIC BLOOD PRESSURE: 108 MMHG | TEMPERATURE: 97.1 F

## 2021-02-05 DIAGNOSIS — K80.10 CHRONIC CHOLECYSTITIS WITH CALCULUS: Primary | ICD-10-CM

## 2021-02-05 PROCEDURE — 4004F PT TOBACCO SCREEN RCVD TLK: CPT | Performed by: SURGERY

## 2021-02-05 PROCEDURE — G8427 DOCREV CUR MEDS BY ELIG CLIN: HCPCS | Performed by: SURGERY

## 2021-02-05 PROCEDURE — 3017F COLORECTAL CA SCREEN DOC REV: CPT | Performed by: SURGERY

## 2021-02-05 PROCEDURE — 4040F PNEUMOC VAC/ADMIN/RCVD: CPT | Performed by: SURGERY

## 2021-02-05 PROCEDURE — 99204 OFFICE O/P NEW MOD 45 MIN: CPT | Performed by: SURGERY

## 2021-02-05 PROCEDURE — G8420 CALC BMI NORM PARAMETERS: HCPCS | Performed by: SURGERY

## 2021-02-05 PROCEDURE — G8484 FLU IMMUNIZE NO ADMIN: HCPCS | Performed by: SURGERY

## 2021-02-05 PROCEDURE — 1123F ACP DISCUSS/DSCN MKR DOCD: CPT | Performed by: SURGERY

## 2021-02-05 NOTE — PROGRESS NOTES
doing his surgery he is going to see his vascular surgeon next week to see if it is okay to proceed with a cholecystectomy. SUBJECTIVE/OBJECTIVE:  JOSÉ LUIS Mckeon is a 76 y.o. male who is being seen at the request of [id number] [facility] ER for possible gallbladder disease. The symptoms include intermittent right upper quadrant pain, pain radiating to the back and epigastric pain, nausea and vomiting. The patient denies juandice and/or dark urine. The symptoms were first noticed 1 years ago. The pain is rated as a 8 in intensity. The symptoms are stable with time. The patient does have a family history of gallbladder disease. CAT scan on 1/20/2021 showed acute cholecystitis with stones. He is on any anticoagulants(Plavix). The patient recently had an emergent right carotid surgery and that is why he is on his Plavix. Apparently the surgery was uneventful          /60   Temp 97.1 °F (36.2 °C) (Temporal)   Ht 5' 5\" (1.651 m)   Wt 130 lb (59 kg)   BMI 21.63 kg/m²     Physical Exam  Constitutional:       General: He is not in acute distress. Appearance: Normal appearance. HENT:      Mouth/Throat:      Mouth: Mucous membranes are moist.      Pharynx: Oropharynx is clear. Eyes:      Pupils: Pupils are equal, round, and reactive to light. Neck:      Comments: Neck is supple without any masses, no thyromegaly, trachea midline  Oblique right neck surgical scar with incision glued in place  Cardiovascular:      Rate and Rhythm: Normal rate. Heart sounds: No murmur. Pulmonary:      Effort: Pulmonary effort is normal. No respiratory distress. Breath sounds: Normal breath sounds. Abdominal:      General: Abdomen is flat. Palpations: Abdomen is soft. There is no hepatomegaly or splenomegaly. Tenderness: There is abdominal tenderness in the right upper quadrant. Negative signs include McBurney's sign. Hernia: No hernia is present.    Musculoskeletal:      Comments: Normal gait Skin:     Findings: No bruising, lesion or rash. Neurological:      Mental Status: He is alert and oriented to person, place, and time. Psychiatric:         Mood and Affect: Mood normal.         Judgment: Judgment normal.                 An electronic signature was used to authenticate this note.     --Susi Neal MD

## 2021-02-23 ENCOUNTER — TELEPHONE (OUTPATIENT)
Dept: SURGERY | Age: 66
End: 2021-02-23

## 2021-02-23 NOTE — TELEPHONE ENCOUNTER
Pt called to notify us that his Cardiology doctor, Yulisa West @ [facility] will not clear him for surgery for at least 3 months. He will have to follow-up with Yulisa West to see if he can be cleared in the future. I told him to let us know and we would need a clearance letter from Yulisa West to be able to schedule him for his hernia repair surgery in the future. He understood and said he will let us know when he's ok to schedule.

## 2021-04-05 ENCOUNTER — HOSPITAL ENCOUNTER (EMERGENCY)
Age: 66
Discharge: HOME | End: 2021-04-06
Payer: MEDICARE

## 2021-04-05 DIAGNOSIS — R10.31 RIGHT LOWER QUADRANT ABDOMINAL PAIN: Primary | ICD-10-CM

## 2021-04-05 DIAGNOSIS — K80.20 CALCULUS OF GALLBLADDER WITHOUT CHOLECYSTITIS WITHOUT OBSTRUCTION: ICD-10-CM

## 2021-04-05 LAB — POC CREATININE WHOLE BLOOD: 1.1

## 2021-04-05 PROCEDURE — 83690 ASSAY OF LIPASE: CPT

## 2021-04-05 PROCEDURE — 96374 THER/PROPH/DIAG INJ IV PUSH: CPT

## 2021-04-05 PROCEDURE — 80053 COMPREHEN METABOLIC PANEL: CPT

## 2021-04-05 PROCEDURE — 36415 COLL VENOUS BLD VENIPUNCTURE: CPT

## 2021-04-05 PROCEDURE — 81003 URINALYSIS AUTO W/O SCOPE: CPT

## 2021-04-05 PROCEDURE — 99283 EMERGENCY DEPT VISIT LOW MDM: CPT

## 2021-04-05 PROCEDURE — 85025 COMPLETE CBC W/AUTO DIFF WBC: CPT

## 2021-04-05 PROCEDURE — 83605 ASSAY OF LACTIC ACID: CPT

## 2021-04-05 ASSESSMENT — PAIN SCALES - GENERAL: PAINLEVEL_OUTOF10: 7

## 2021-04-06 ENCOUNTER — APPOINTMENT (OUTPATIENT)
Dept: CT IMAGING | Age: 66
End: 2021-04-06
Payer: MEDICARE

## 2021-04-06 ENCOUNTER — HOSPITAL ENCOUNTER (EMERGENCY)
Age: 66
Discharge: HOME | End: 2021-04-07
Attending: EMERGENCY MEDICINE
Payer: MEDICARE

## 2021-04-06 VITALS
HEIGHT: 65 IN | OXYGEN SATURATION: 99 % | BODY MASS INDEX: 22.49 KG/M2 | RESPIRATION RATE: 18 BRPM | SYSTOLIC BLOOD PRESSURE: 135 MMHG | WEIGHT: 135 LBS | DIASTOLIC BLOOD PRESSURE: 78 MMHG | TEMPERATURE: 98.2 F | HEART RATE: 67 BPM

## 2021-04-06 DIAGNOSIS — K80.20 GALLSTONES: Primary | ICD-10-CM

## 2021-04-06 LAB
ALBUMIN SERPL-MCNC: 4.3 G/DL (ref 3.5–4.6)
ALP BLD-CCNC: 170 U/L (ref 35–104)
ALT SERPL-CCNC: 20 U/L (ref 0–41)
ANION GAP SERPL CALCULATED.3IONS-SCNC: 12 MEQ/L (ref 9–15)
AST SERPL-CCNC: 19 U/L (ref 0–40)
BASOPHILS ABSOLUTE: 0 K/UL (ref 0–0.2)
BASOPHILS RELATIVE PERCENT: 0.4 %
BILIRUB SERPL-MCNC: <0.2 MG/DL (ref 0.2–0.7)
BILIRUBIN URINE: NEGATIVE
BLOOD, URINE: NEGATIVE
BUN BLDV-MCNC: 21 MG/DL (ref 8–23)
CALCIUM SERPL-MCNC: 9.5 MG/DL (ref 8.5–9.9)
CHLORIDE BLD-SCNC: 90 MEQ/L (ref 95–107)
CLARITY: CLEAR
CO2: 27 MEQ/L (ref 20–31)
COLOR: YELLOW
CREAT SERPL-MCNC: 1.01 MG/DL (ref 0.7–1.2)
EOSINOPHILS ABSOLUTE: 0.4 K/UL (ref 0–0.7)
EOSINOPHILS RELATIVE PERCENT: 3.8 %
GFR AFRICAN AMERICAN: >60
GFR AFRICAN AMERICAN: >60
GFR NON-AFRICAN AMERICAN: >60
GFR NON-AFRICAN AMERICAN: >60
GLOBULIN: 2.7 G/DL (ref 2.3–3.5)
GLUCOSE BLD-MCNC: 106 MG/DL (ref 70–99)
GLUCOSE URINE: 100 MG/DL
HCT VFR BLD CALC: 39 % (ref 42–52)
HEMOGLOBIN: 13.3 G/DL (ref 14–18)
KETONES, URINE: NEGATIVE MG/DL
LACTIC ACID: 0.9 MMOL/L (ref 0.5–2.2)
LEUKOCYTE ESTERASE, URINE: NEGATIVE
LIPASE: 18 U/L (ref 12–95)
LYMPHOCYTES ABSOLUTE: 1.7 K/UL (ref 1–4.8)
LYMPHOCYTES RELATIVE PERCENT: 17.7 %
MCH RBC QN AUTO: 30.4 PG (ref 27–31.3)
MCHC RBC AUTO-ENTMCNC: 34.1 % (ref 33–37)
MCV RBC AUTO: 89.2 FL (ref 80–100)
MONOCYTES ABSOLUTE: 0.9 K/UL (ref 0.2–0.8)
MONOCYTES RELATIVE PERCENT: 9.5 %
NEUTROPHILS ABSOLUTE: 6.5 K/UL (ref 1.4–6.5)
NEUTROPHILS RELATIVE PERCENT: 68.6 %
NITRITE, URINE: NEGATIVE
PDW BLD-RTO: 13.3 % (ref 11.5–14.5)
PERFORMED ON: NORMAL
PH UA: 7 (ref 5–9)
PLATELET # BLD: 295 K/UL (ref 130–400)
POC CREATININE: 1.1 MG/DL (ref 0.8–1.3)
POC SAMPLE TYPE: NORMAL
POTASSIUM SERPL-SCNC: 3.8 MEQ/L (ref 3.4–4.9)
PROTEIN UA: NEGATIVE MG/DL
RBC # BLD: 4.37 M/UL (ref 4.7–6.1)
SODIUM BLD-SCNC: 129 MEQ/L (ref 135–144)
SPECIFIC GRAVITY UA: 1.02 (ref 1–1.03)
TOTAL PROTEIN: 7 G/DL (ref 6.3–8)
URINE REFLEX TO CULTURE: ABNORMAL
UROBILINOGEN, URINE: 1 E.U./DL
WBC # BLD: 9.5 K/UL (ref 4.8–10.8)

## 2021-04-06 PROCEDURE — 6360000004 HC RX CONTRAST MEDICATION: Performed by: PHYSICIAN ASSISTANT

## 2021-04-06 PROCEDURE — 74177 CT ABD & PELVIS W/CONTRAST: CPT

## 2021-04-06 PROCEDURE — 6360000002 HC RX W HCPCS: Performed by: PHYSICIAN ASSISTANT

## 2021-04-06 PROCEDURE — 99283 EMERGENCY DEPT VISIT LOW MDM: CPT

## 2021-04-06 PROCEDURE — 96372 THER/PROPH/DIAG INJ SC/IM: CPT

## 2021-04-06 RX ORDER — KETOROLAC TROMETHAMINE 15 MG/ML
15 INJECTION, SOLUTION INTRAMUSCULAR; INTRAVENOUS ONCE
Status: COMPLETED | OUTPATIENT
Start: 2021-04-06 | End: 2021-04-06

## 2021-04-06 RX ORDER — DICYCLOMINE HYDROCHLORIDE 10 MG/1
10 CAPSULE ORAL EVERY 6 HOURS PRN
Qty: 20 CAPSULE | Refills: 0 | Status: ON HOLD | OUTPATIENT
Start: 2021-04-06 | End: 2022-07-03 | Stop reason: HOSPADM

## 2021-04-06 RX ORDER — HYDROCODONE BITARTRATE AND ACETAMINOPHEN 5; 325 MG/1; MG/1
1 TABLET ORAL EVERY 6 HOURS PRN
Qty: 10 TABLET | Refills: 0 | Status: SHIPPED | OUTPATIENT
Start: 2021-04-06 | End: 2021-04-09

## 2021-04-06 RX ORDER — ONDANSETRON 2 MG/ML
4 INJECTION INTRAMUSCULAR; INTRAVENOUS ONCE
Status: COMPLETED | OUTPATIENT
Start: 2021-04-06 | End: 2021-04-06

## 2021-04-06 RX ADMIN — IOPAMIDOL 100 ML: 612 INJECTION, SOLUTION INTRAVENOUS at 00:44

## 2021-04-06 RX ADMIN — HYDROMORPHONE HYDROCHLORIDE 0.5 MG: 1 INJECTION, SOLUTION INTRAMUSCULAR; INTRAVENOUS; SUBCUTANEOUS at 00:26

## 2021-04-06 RX ADMIN — ONDANSETRON 4 MG: 2 INJECTION INTRAMUSCULAR; INTRAVENOUS at 00:24

## 2021-04-06 RX ADMIN — KETOROLAC TROMETHAMINE 15 MG: 15 INJECTION, SOLUTION INTRAMUSCULAR; INTRAVENOUS at 00:24

## 2021-04-06 ASSESSMENT — ENCOUNTER SYMPTOMS
PHOTOPHOBIA: 0
NAUSEA: 0
ABDOMINAL PAIN: 1
ANAL BLEEDING: 0
BACK PAIN: 0
ABDOMINAL DISTENTION: 0
EYE DISCHARGE: 0
VOMITING: 0
APNEA: 0
COUGH: 0
VOICE CHANGE: 0

## 2021-04-06 ASSESSMENT — PAIN SCALES - GENERAL
PAINLEVEL_OUTOF10: 3
PAINLEVEL_OUTOF10: 10
PAINLEVEL_OUTOF10: 10

## 2021-04-06 NOTE — ED NOTES
Lino AMOR at the bedside at this time. Assessment completed.       Jovita Closs, RN  04/05/21 6277 room air

## 2021-04-06 NOTE — ED NOTES
Pt up to restroom at this time. Pt has a stable gait as observed by this RN.      Liana Hylton RN  04/05/21 0151

## 2021-04-06 NOTE — ED PROVIDER NOTES
[address] [facility] ED  eMERGENCY dEPARTMENT eNCOUnter      Pt Name: Jonny Monroe  MRN: [id number]  [place] 1955  Date of evaluation: 4/5/2021  Provider: Raymond Rome PA-C    CHIEF COMPLAINT       Chief Complaint   Patient presents with    Abdominal Pain         HISTORY OF PRESENT ILLNESS   (Location/Symptom, Timing/Onset,Context/Setting, Quality, Duration, Modifying Factors, Severity)  Note limiting factors. Jonny Monroe is a 76 y.o. male who presents to the emergency department presents 1 day history of right upper quad abdominal pain history of choledocholithiasis notes its worst today than it has been he does have cholelithiasis has follow-up with surgeon. Denies fever chills nausea vomiting. HPI    NursingNotes were reviewed. REVIEW OF SYSTEMS    (2-9 systems for level 4, 10 or more for level 5)     Review of Systems   Constitutional: Negative for activity change, appetite change, fever and unexpected weight change. HENT: Negative for ear discharge, nosebleeds and voice change. Eyes: Negative for photophobia and discharge. Respiratory: Negative for apnea and cough. Cardiovascular: Negative for chest pain. Gastrointestinal: Positive for abdominal pain. Negative for abdominal distention, anal bleeding, nausea and vomiting. Endocrine: Negative for cold intolerance, heat intolerance and polyphagia. Genitourinary: Negative for genital sores. Musculoskeletal: Negative for back pain and joint swelling. Skin: Negative for pallor. Allergic/Immunologic: Negative for immunocompromised state. Neurological: Negative for seizures and facial asymmetry. Hematological: Does not bruise/bleed easily. Psychiatric/Behavioral: Negative for behavioral problems, self-injury and sleep disturbance. All other systems reviewed and are negative. Except as noted above the remainder of the review of systems was reviewed and negative.        PAST MEDICAL HISTORY     Past Medical History:   Diagnosis Date    Back pain     chronic    Epile[address] ([facility] [address]     Hyperlipidemia     Hypertension     Shingles          SURGICALHISTORY       Past Surgical History:   Procedure Laterality Date    PENILE PROSTHESIS           CURRENT MEDICATIONS       Previous Medications    ALBUTEROL SULFATE HFA (PROVENTIL HFA) 108 (90 BASE) MCG/ACT INHALER    Inhale 1-2 puffs into the lungs every 4 hours as needed for Wheezing or Shortness of Breath (Space out to every 6 hours as symptoms improve) Space out to every 6 hours as symptoms improve. CLOPIDOGREL (PLAVIX) 75 MG TABLET    Take 75 mg by mouth daily    IBUPROFEN (ADVIL;MOTRIN) 800 MG TABLET    Take 1 tablet by mouth every 8 hours as needed for Pain or Fever    LISINOPRIL (PRINIVIL;ZESTRIL) 10 MG TABLET    Take by mouth daily    PHENOBARBITAL (LUMINAL) 32.4 MG TABLET    Take 129.6 mg by mouth 2 times daily       ALLERGIES     Patient has no known allergies. FAMILY HISTORY     History reviewed. No pertinent family history.        SOCIAL HISTORY       Social History     Socioeconomic History    Marital status:      Spouse name: None    Number of children: None    Years of education: None    Highest education level: None   Occupational History    None   Social Needs    Financial resource strain: None    Food insecurity     Worry: None     Inability: None    Transportation needs     Medical: None     Non-medical: None   Tobacco Use    Smoking status: Current Every Day Smoker     Packs/day: 1.00     Years: 10.00     Pack years: 10.00     Types: Cigarettes    Smokeless tobacco: Never Used   Substance and Sexual Activity    Alcohol use: Not Currently    Drug use: Never    Sexual activity: None   Lifestyle    Physical activity     Days per week: None     Minutes per session: None    Stress: None   Relationships    Social connections     Talks on phone: None     Gets together: None     Attends Judaism service: None     Active member of club or organization: None     Attends meetings of clubs or organizations: None     Relationship status: None    Intimate partner violence     Fear of current or ex partner: None     Emotionally abused: None     Physically abused: None     Forced sexual activity: None   Other Topics Concern    None   Social History Narrative    None       SCREENINGS      @FLOW([id number])@      PHYSICAL EXAM    (up to 7 for level 4, 8 or more for level 5)     ED Triage Vitals [04/05/21 2325]   BP Temp Temp src Pulse Resp SpO2 Height Weight   (!) 164/67 98.2 °F (36.8 °C) -- 107 20 95 % 5' 5\" (1.651 m) 135 lb (61.2 kg)       Physical Exam  Vitals signs and nursing note reviewed. Constitutional:       General: He is not in acute distress. Appearance: He is well-developed. HENT:      Head: Normocephalic and atraumatic. Right Ear: External ear normal.      Left Ear: External ear normal.      Nose: Nose normal.      Mouth/Throat:      Mouth: Mucous membranes are moist.   Eyes:      General:         Right eye: No discharge. Left eye: No discharge. Pupils: Pupils are equal, round, and reactive to light. Neck:      Musculoskeletal: Normal range of motion and neck supple. Cardiovascular:      Rate and Rhythm: Normal rate and regular rhythm. Heart sounds: Normal heart sounds. Pulmonary:      Effort: Pulmonary effort is normal. No respiratory distress. Breath sounds: Normal breath sounds. No stridor. Abdominal:      General: Bowel sounds are normal. There is no distension. Palpations: Abdomen is soft. Tenderness: There is abdominal tenderness in the right upper quadrant. Musculoskeletal: Normal range of motion. Skin:     General: Skin is warm. Findings: No erythema. Neurological:      Mental Status: He is alert and oriented to person, place, and time.       Gait: Gait normal.   Psychiatric:         Mood and Affect: Mood normal.         DIAGNOSTIC RESULTS     EKG: All EKG's are interpreted by the Emergency Department Physician who either signs or Co-signsthis chart in the absence of a cardiologist.         RADIOLOGY:   Dearl Ravel such as CT, Ultrasound and MRI are read by the radiologist. Plain radiographic images are visualized and preliminarily interpreted by the emergency physician with the below findings:         Interpretation per the Radiologist below, if available at the time ofthis note:    CT ABDOMEN PELVIS W IV CONTRAST Additional Contrast? None    (Results Pending)         ED BEDSIDE ULTRASOUND:   Performed by ED Physician - none    LABS:  Labs Reviewed   COMPREHENSIVE METABOLIC PANEL - Abnormal; Notable for the following components:       Result Value    Sodium 129 (*)     Chloride 90 (*)     Glucose 106 (*)     Alkaline Phosphatase 170 (*)     All other components within normal limits   CBC WITH AUTO DIFFERENTIAL - Abnormal; Notable for the following components:    RBC 4.37 (*)     Hemoglobin 13.3 (*)     Hematocrit 39.0 (*)     Monocytes Absolute 0.9 (*)     All other components within normal limits   URINE RT REFLEX TO CULTURE - Abnormal; Notable for the following components:    Glucose, Ur 100 (*)     All other components within normal limits   POCT CREATININE - URINE - Normal   LACTIC ACID, PLASMA   LIPASE       All other labs were within normal range or not returned as of this dictation. EMERGENCY DEPARTMENT COURSE and DIFFERENTIAL DIAGNOSIS/MDM:   Vitals:    Vitals:    04/06/21 0100 04/06/21 0115 04/06/21 0130 04/06/21 0135   BP: (!) 158/48  (!) 155/110 (!) 158/74   Pulse:    66   Resp:    18   Temp:       SpO2: 97% 96% 97% 98%   Weight:       Height:                MDM  Number of Diagnoses or Management Options  Calculus of gallbladder without cholecystitis without obstruction  Right lower quadrant abdominal pain  Diagnosis management comments: Patient follow-up with Dr. Dinesh You.  Return to if any symptoms worsen or new symptoms well.        Amount and/or Complexity of Data Reviewed  Clinical lab tests: reviewed and ordered  Tests in the radiology section of CPT®: ordered and reviewed            CONSULTS:  None    PROCEDURES:  Unless otherwise noted below, none     Procedures    FINAL IMPRESSION      1. Right lower quadrant abdominal pain    2. Calculus of gallbladder without cholecystitis without obstruction          DISPOSITION/PLAN   DISPOSITION Decision To Discharge 04/06/2021 01:32:59 AM      PATIENT REFERRED TO:  Kelsey Kang MD  [address]  [phone]    Call in 1 day      [facility]) ED  [address] [facility]  [phone]    If symptoms worsen      DISCHARGE MEDICATIONS:  New Prescriptions    DICYCLOMINE (BENTYL) 10 MG CAPSULE    Take 1 capsule by mouth every 6 hours as needed (cramps)    HYDROCODONE-ACETAMINOPHEN (NORCO) 5-325 MG PER TABLET    Take 1 tablet by mouth every 6 hours as needed for Pain for up to 3 days.           (Please note that portions of this note were completed with a voice recognition program.  Efforts were made to edit the dictations but occasionally words are mis-transcribed.)    Taylor Pablo PA-C (electronically signed)  Attending Emergency Physician       Taylor Pablo PA-C  04/06/21 6253 Erwin Escalante PA-C  04/06/21 6516

## 2021-04-06 NOTE — ED NOTES
Labs and urine obtained by this RN, labeled and sent to lab via tube system.       Kari Reynolds RN  04/05/21 0255

## 2021-04-07 VITALS
RESPIRATION RATE: 20 BRPM | SYSTOLIC BLOOD PRESSURE: 135 MMHG | OXYGEN SATURATION: 98 % | BODY MASS INDEX: 21.66 KG/M2 | HEART RATE: 74 BPM | WEIGHT: 130 LBS | TEMPERATURE: 98.2 F | DIASTOLIC BLOOD PRESSURE: 78 MMHG | HEIGHT: 65 IN

## 2021-04-07 PROCEDURE — 6360000002 HC RX W HCPCS: Performed by: EMERGENCY MEDICINE

## 2021-04-07 PROCEDURE — 6370000000 HC RX 637 (ALT 250 FOR IP): Performed by: EMERGENCY MEDICINE

## 2021-04-07 RX ORDER — ONDANSETRON 4 MG/1
4 TABLET, ORALLY DISINTEGRATING ORAL EVERY 8 HOURS PRN
Qty: 20 TABLET | Refills: 0 | Status: SHIPPED | OUTPATIENT
Start: 2021-04-07 | End: 2021-04-14

## 2021-04-07 RX ORDER — OXYCODONE HYDROCHLORIDE AND ACETAMINOPHEN 5; 325 MG/1; MG/1
1 TABLET ORAL EVERY 6 HOURS PRN
Qty: 12 TABLET | Refills: 0 | Status: SHIPPED | OUTPATIENT
Start: 2021-04-07 | End: 2021-04-10

## 2021-04-07 RX ORDER — ONDANSETRON 4 MG/1
4 TABLET, ORALLY DISINTEGRATING ORAL ONCE
Status: COMPLETED | OUTPATIENT
Start: 2021-04-07 | End: 2021-04-07

## 2021-04-07 RX ADMIN — ONDANSETRON 4 MG: 4 TABLET, ORALLY DISINTEGRATING ORAL at 00:17

## 2021-04-07 RX ADMIN — HYDROMORPHONE HYDROCHLORIDE 1 MG: 1 INJECTION, SOLUTION INTRAMUSCULAR; INTRAVENOUS; SUBCUTANEOUS at 00:16

## 2021-04-07 NOTE — ED PROVIDER NOTES
eMERGENCY dEPARTMENT eNCOUnter      [address] [facility]    Chief Complaint   Patient presents with    Abdominal Pain       HPI    Travis Trimble is a 76 y.o. male history of epilepsy, hyperlipidemia, hypertension, recent right carotid endarterectomy on Plavix who presentsto ED from home with daughter  By private car  With complaint of upper abdominal pain  Onset few days worse today  Intensity of symptoms moderate  Patient has got a history of gallstones and is scheduled for surgery after he is taken off Plavix. Patient states his gallstones started acting after he ate food. Patient also has nausea and vomiting. Patient took [address] which did not help with the pain. He denies any chest pain shortness of breath. Patient denies any fever or chills. PAST MEDICAL HISTORY    Past Medical History:   Diagnosis Date    Back pain     chronic    Epilepsy ([address]     Hyperlipidemia     Hypertension     Shingles        SURGICAL HISTORY    Past Surgical History:   Procedure Laterality Date    PENILE PROSTHESIS         CURRENT MEDICATIONS    Current Outpatient Rx   Medication Sig Dispense Refill    ondansetron (ZOFRAN-ODT) 4 MG disintegrating tablet Place 1 tablet under the tongue every 8 hours as needed for Nausea or Vomiting May Sub regular tablet (non-ODT) if insurance does not cover ODT. 20 tablet 0    oxyCODONE-acetaminophen (PERCOCET) 5-325 MG per tablet Take 1 tablet by mouth every 6 hours as needed for Pain for up to 3 days. Intended supply: 3 days. Take lowest dose possible to manage pain 12 tablet 0    HYDROcodone-acetaminophen (NORCO) 5-325 MG per tablet Take 1 tablet by mouth every 6 hours as needed for Pain for up to 3 days.  10 tablet 0    dicyclomine (BENTYL) 10 MG capsule Take 1 capsule by mouth every 6 hours as needed (cramps) 20 capsule 0    clopidogrel (PLAVIX) 75 MG tablet Take 75 mg by mouth daily      lisinopril (PRINIVIL;ZESTRIL) 10 MG tablet Take by mouth daily      albuterol sulfate HFA (PROVENTIL HFA) 108 (90 BASE) MCG/ACT inhaler Inhale 1-2 puffs into the lungs every 4 hours as needed for Wheezing or Shortness of Breath (Space out to every 6 hours as symptoms improve) Space out to every 6 hours as symptoms improve. 1 Inhaler 0    PHENobarbital (LUMINAL) 32.4 MG tablet Take 129.6 mg by mouth 2 times daily      ibuprofen (ADVIL;MOTRIN) 800 MG tablet Take 1 tablet by mouth every 8 hours as needed for Pain or Fever 20 tablet 0       ALLERGIES    No Known Allergies    FAMILY HISTORY    History reviewed. No pertinent family history.     SOCIAL HISTORY    Social History     Socioeconomic History    Marital status:      Spouse name: None    Number of children: None    Years of education: None    Highest education level: None   Occupational History    None   Social Needs    Financial resource strain: None    Food insecurity     Worry: None     Inability: None    Transportation needs     Medical: None     Non-medical: None   Tobacco Use    Smoking status: Current Every Day Smoker     Packs/day: 1.00     Years: 10.00     Pack years: 10.00     Types: Cigarettes    Smokeless tobacco: Never Used   Substance and Sexual Activity    Alcohol use: Not Currently    Drug use: Never    Sexual activity: None   Lifestyle    Physical activity     Days per week: None     Minutes per session: None    Stress: None   Relationships    Social connections     Talks on phone: None     Gets together: None     Attends Christianity service: None     Active member of club or organization: None     Attends meetings of clubs or organizations: None     Relationship status: None    Intimate partner violence     Fear of current or ex partner: None     Emotionally abused: None     Physically abused: None     Forced sexual activity: None   Other Topics Concern    None   Social History Narrative    None       REVIEW OF SYSTEMS    Constitutional:  Denies fever, chills, weight loss or weakness   Eyes:  Denies Pain control adequate. Reviewed prior visit including CT scan and blood work. Patient will get the ultrasound of the gallbladder done tomorrow as an outpatient. Summation      Patient Course:     ED Medications administered this visit:    Medications   HYDROmorphone (DILAUDID) injection 1 mg (1 mg Intramuscular Given 4/7/21 0016)   ondansetron (ZOFRAN-ODT) disintegrating tablet 4 mg (4 mg Oral Given 4/7/21 0017)       New Prescriptions from this visit:    New Prescriptions    ONDANSETRON (ZOFRAN-ODT) 4 MG DISINTEGRATING TABLET    Place 1 tablet under the tongue every 8 hours as needed for Nausea or Vomiting May Sub regular tablet (non-ODT) if insurance does not cover ODT. OXYCODONE-ACETAMINOPHEN (PERCOCET) 5-325 MG PER TABLET    Take 1 tablet by mouth every 6 hours as needed for Pain for up to 3 days. Intended supply: 3 days. Take lowest dose possible to manage pain       Follow-up:  Ankur Jorge DO  [address] [address]  [phone]    Call in 1 day      Kan Cardenas MD  [facility] [address] [facility]  [phone]    Call in 1 day          Final Impression:   1.  Gallstones               (Please note that portions of this note were completed with a voice recognition program.  Efforts were made to edit the dictations but occasionally words are mis-transcribed.)          Lilia Meeks MD  04/07/21 9033

## 2021-04-08 ENCOUNTER — PREP FOR PROCEDURE (OUTPATIENT)
Dept: SURGERY | Age: 66
End: 2021-04-08

## 2021-04-08 ENCOUNTER — OFFICE VISIT (OUTPATIENT)
Dept: SURGERY | Age: 66
End: 2021-04-08
Payer: MEDICARE

## 2021-04-08 VITALS
TEMPERATURE: 99.5 F | HEIGHT: 65 IN | SYSTOLIC BLOOD PRESSURE: 116 MMHG | BODY MASS INDEX: 21.69 KG/M2 | WEIGHT: 130.2 LBS | DIASTOLIC BLOOD PRESSURE: 62 MMHG

## 2021-04-08 DIAGNOSIS — K80.10 CHRONIC CHOLECYSTITIS WITH CALCULUS: Primary | ICD-10-CM

## 2021-04-08 PROCEDURE — 4040F PNEUMOC VAC/ADMIN/RCVD: CPT | Performed by: SURGERY

## 2021-04-08 PROCEDURE — 4004F PT TOBACCO SCREEN RCVD TLK: CPT | Performed by: SURGERY

## 2021-04-08 PROCEDURE — 1123F ACP DISCUSS/DSCN MKR DOCD: CPT | Performed by: SURGERY

## 2021-04-08 PROCEDURE — 3017F COLORECTAL CA SCREEN DOC REV: CPT | Performed by: SURGERY

## 2021-04-08 PROCEDURE — G8420 CALC BMI NORM PARAMETERS: HCPCS | Performed by: SURGERY

## 2021-04-08 PROCEDURE — 99214 OFFICE O/P EST MOD 30 MIN: CPT | Performed by: SURGERY

## 2021-04-08 PROCEDURE — G8427 DOCREV CUR MEDS BY ELIG CLIN: HCPCS | Performed by: SURGERY

## 2021-04-08 RX ORDER — OXYCODONE AND ACETAMINOPHEN 7.5; 325 MG/1; MG/1
1 TABLET ORAL EVERY 6 HOURS PRN
Qty: 20 TABLET | Refills: 0 | Status: SHIPPED | OUTPATIENT
Start: 2021-04-08 | End: 2021-04-13

## 2021-04-08 ASSESSMENT — ENCOUNTER SYMPTOMS: ABDOMINAL PAIN: 1

## 2021-04-08 NOTE — PROGRESS NOTES
Zhang Thompson (:  1955) is a 76 y.o. male,New patient, here for evaluation of the following chief complaint(s):  Abdominal Pain (gall Bladder)      ASSESSMENT/PLAN:  chronic cholecystitis with cholelithiasis, symptomatic  Recent right carotid surgery and on Plavix    Cholecystectomy with Cholangiogram    The patient was given the options of therapy. The patient is symptomatic and on Plavix which she will need to stay on due to recent vascular surgery. I feel that the surgery should be done and we will keep him on his Plavix. He accepts the risk of mild increased bleeding. The procedure of laparascopic as well as the open procedure were discussed. I explained that a cholangiogram with dye would be attempted but not always done. The risks and complications including but not exclusive to include infection, blood loss, bile ductal damage, bile leakage, retained stones and non resolution of pain . If any of these were to occur it may require an additional surgery to correct. The patient understands and agrees to the procedure. The expected convalescence was discussed. The patient was counseled at length about the risks of jhonathan Covid-19 in the perioperative period and any recovery window from their procedure. The patient was made aware that jhonathan Covid-19  may worsen their prognosis for recovering from their procedure  and lend to a higher morbidity and/or mortality risk. The patient was given the options of postponing their procedure. All material risks, benefits, and alternatives were discussed. The patient does wish to proceed with the procedure at this time. Please note this report has been partially produced using speech recognition software and may cause contain errors related to that system including grammar, punctuation and spelling as well as words and phrases that may seem inappropriate.  If there are questions or concerns please feel free to contact me to clarify    Initially his vascular surgeon and cardiologist recommended that he wait 3 months for surgery because they did not want to stop the Plavix. However he has had significant abdominal pain and seen in the emergency room twice. They have now given the okay to stop the Plavix and do the surgery. He states the Percocet 5 is barely helping his pain. I recommended he take 2 of them several times a day and when his prescription is done I will give him a prescription for 7.5 mg to take as needed. He is to stop his Plavix and plan surgery for next week. .  SUBJECTIVE/OBJECTIVE:  JOSÉ LUIS Morrow is a 76 y.o. male who is being seen in follow-up for gallbladder disease. The symptoms include intermittent right upper quadrant pain, pain radiating to the back and epigastric pain, nausea and vomiting. The patient denies juandice and/or dark urine. The symptoms were first noticed 1 year ago. The pain is rated as a 8 in intensity. The symptoms are stable with time. The patient does have a family history of gallbladder disease. CAT scan on 1/20/2021 showed acute cholecystitis with stones. He is on anticoagulants(Plavix). The patient recently had an emergent right carotid surgery and that is why he is on his Plavix. Apparently the surgery was uneventful  Since his last visit the patient was told that he should not have any surgery done for at least 3 months. However over the last 4 days he has developed significant right upper quadrant abdominal pain associated with nausea and vomiting and has been to the emergency room twice. He is currently taking Percocet for pain. His doctors have now told him he can stop his Plavix and have his surgery done.     Review of systems  14 systems reviewed and negative other than history of present illness    Past Medical History:   Diagnosis Date    Back pain     chronic    Epilepsy ([address]     Hyperlipidemia     Hypertension     Shingles      Past Surgical History:   Procedure Laterality Date of Breath (Space out to every 6 hours as symptoms improve) Space out to every 6 hours as symptoms improve. (Patient not taking: Reported on 4/8/2021) 1 Inhaler 0     No current facility-administered medications for this visit. /62   Temp 99.5 °F (37.5 °C) (Temporal)   Ht 5' 5\" (1.651 m)   Wt 130 lb 3.2 oz (59.1 kg)   BMI 21.67 kg/m²     Physical Exam  Constitutional:       General: He is not in acute distress. Appearance: Normal appearance. HENT:      Mouth/Throat:      Mouth: Mucous membranes are moist.      Pharynx: Oropharynx is clear. Eyes:      Pupils: Pupils are equal, round, and reactive to light. Neck:      Comments: Neck is supple without any masses, no thyromegaly, trachea midline  Oblique right neck surgical scar with incision glued in place  Cardiovascular:      Rate and Rhythm: Normal rate. Heart sounds: No murmur. Pulmonary:      Effort: Pulmonary effort is normal. No respiratory distress. Breath sounds: Normal breath sounds. Abdominal:      General: Abdomen is flat. Palpations: Abdomen is soft. There is no hepatomegaly or splenomegaly. Tenderness: There is abdominal tenderness in the right upper quadrant. Negative signs include McBurney's sign. Hernia: No hernia is present. Musculoskeletal:      Comments: Normal gait   Skin:     Findings: No bruising, lesion or rash. Neurological:      Mental Status: He is alert and oriented to person, place, and time. Psychiatric:         Mood and Affect: Mood normal.         Judgment: Judgment normal.       An electronic signature was used to authenticate this note.     --Ave Kaplan MD

## 2021-04-09 NOTE — H&P (VIEW-ONLY)
Caio Dao (:  1955) is a 76 y.o. male,New patient, here for evaluation of the following chief complaint(s):  Abdominal Pain (gall Bladder)      ASSESSMENT/PLAN:  chronic cholecystitis with cholelithiasis, symptomatic  Recent right carotid surgery and on Plavix    Cholecystectomy with Cholangiogram    The patient was given the options of therapy. The patient is symptomatic and on Plavix which she will need to stay on due to recent vascular surgery. I feel that the surgery should be done and we will keep him on his Plavix. He accepts the risk of mild increased bleeding. The procedure of laparascopic as well as the open procedure were discussed. I explained that a cholangiogram with dye would be attempted but not always done. The risks and complications including but not exclusive to include infection, blood loss, bile ductal damage, bile leakage, retained stones and non resolution of pain . If any of these were to occur it may require an additional surgery to correct. The patient understands and agrees to the procedure. The expected convalescence was discussed. The patient was counseled at length about the risks of jhonathan Covid-19 in the perioperative period and any recovery window from their procedure. The patient was made aware that jhonathan Covid-19  may worsen their prognosis for recovering from their procedure  and lend to a higher morbidity and/or mortality risk. The patient was given the options of postponing their procedure. All material risks, benefits, and alternatives were discussed. The patient does wish to proceed with the procedure at this time. Please note this report has been partially produced using speech recognition software and may cause contain errors related to that system including grammar, punctuation and spelling as well as words and phrases that may seem inappropriate.  If there are questions or concerns please feel free to contact me to clarify    Initially his vascular surgeon and cardiologist recommended that he wait 3 months for surgery because they did not want to stop the Plavix. However he has had significant abdominal pain and seen in the emergency room twice. They have now given the okay to stop the Plavix and do the surgery. He states the Percocet 5 is barely helping his pain. I recommended he take 2 of them several times a day and when his prescription is done I will give him a prescription for 7.5 mg to take as needed. He is to stop his Plavix and plan surgery for next week. .  SUBJECTIVE/OBJECTIVE:  JOSÉ LUIS Gonzales is a 76 y.o. male who is being seen in follow-up for gallbladder disease. The symptoms include intermittent right upper quadrant pain, pain radiating to the back and epigastric pain, nausea and vomiting. The patient denies juandice and/or dark urine. The symptoms were first noticed 1 year ago. The pain is rated as a 8 in intensity. The symptoms are stable with time. The patient does have a family history of gallbladder disease. CAT scan on 1/20/2021 showed acute cholecystitis with stones. He is on anticoagulants(Plavix). The patient recently had an emergent right carotid surgery and that is why he is on his Plavix. Apparently the surgery was uneventful  Since his last visit the patient was told that he should not have any surgery done for at least 3 months. However over the last 4 days he has developed significant right upper quadrant abdominal pain associated with nausea and vomiting and has been to the emergency room twice. He is currently taking Percocet for pain. His doctors have now told him he can stop his Plavix and have his surgery done.     Review of systems  14 systems reviewed and negative other than history of present illness    Past Medical History:   Diagnosis Date    Back pain     chronic    Epilepsy ([facility] [address]     Hyperlipidemia     Hypertension     Shingles      Past Surgical History:   Procedure Laterality Date of Breath (Space out to every 6 hours as symptoms improve) Space out to every 6 hours as symptoms improve. (Patient not taking: Reported on 4/8/2021) 1 Inhaler 0     No current facility-administered medications for this visit. /62   Temp 99.5 °F (37.5 °C) (Temporal)   Ht 5' 5\" (1.651 m)   Wt 130 lb 3.2 oz (59.1 kg)   BMI 21.67 kg/m²     Physical Exam  Constitutional:       General: He is not in acute distress. Appearance: Normal appearance. HENT:      Mouth/Throat:      Mouth: Mucous membranes are moist.      Pharynx: Oropharynx is clear. Eyes:      Pupils: Pupils are equal, round, and reactive to light. Neck:      Comments: Neck is supple without any masses, no thyromegaly, trachea midline  Oblique right neck surgical scar with incision glued in place  Cardiovascular:      Rate and Rhythm: Normal rate. Heart sounds: No murmur. Pulmonary:      Effort: Pulmonary effort is normal. No respiratory distress. Breath sounds: Normal breath sounds. Abdominal:      General: Abdomen is flat. Palpations: Abdomen is soft. There is no hepatomegaly or splenomegaly. Tenderness: There is abdominal tenderness in the right upper quadrant. Negative signs include McBurney's sign. Hernia: No hernia is present. Musculoskeletal:      Comments: Normal gait   Skin:     Findings: No bruising, lesion or rash. Neurological:      Mental Status: He is alert and oriented to person, place, and time. Psychiatric:         Mood and Affect: Mood normal.         Judgment: Judgment normal.       An electronic signature was used to authenticate this note.     --Rere Ceballos MD

## 2021-04-12 ENCOUNTER — HOSPITAL ENCOUNTER (OUTPATIENT)
Dept: ULTRASOUND IMAGING | Age: 66
Discharge: HOME | End: 2021-04-14
Payer: MEDICARE

## 2021-04-12 DIAGNOSIS — K80.20 GALLSTONES: ICD-10-CM

## 2021-04-12 PROCEDURE — 76705 ECHO EXAM OF ABDOMEN: CPT

## 2021-04-13 ENCOUNTER — ANESTHESIA EVENT (OUTPATIENT)
Dept: OPERATING ROOM | Age: 66
End: 2021-04-13
Payer: MEDICARE

## 2021-04-13 ENCOUNTER — HOSPITAL ENCOUNTER (OUTPATIENT)
Age: 66
Setting detail: OUTPATIENT SURGERY
Discharge: HOME | End: 2021-04-13
Attending: SURGERY | Admitting: SURGERY
Payer: MEDICARE

## 2021-04-13 ENCOUNTER — HOSPITAL ENCOUNTER (OUTPATIENT)
Dept: GENERAL RADIOLOGY | Age: 66
Setting detail: OUTPATIENT SURGERY
Discharge: HOME | End: 2021-04-15
Attending: SURGERY
Payer: MEDICARE

## 2021-04-13 ENCOUNTER — ANESTHESIA (OUTPATIENT)
Dept: OPERATING ROOM | Age: 66
End: 2021-04-13
Payer: MEDICARE

## 2021-04-13 VITALS — SYSTOLIC BLOOD PRESSURE: 111 MMHG | OXYGEN SATURATION: 100 % | DIASTOLIC BLOOD PRESSURE: 52 MMHG

## 2021-04-13 VITALS
RESPIRATION RATE: 16 BRPM | HEIGHT: 65 IN | BODY MASS INDEX: 21.66 KG/M2 | DIASTOLIC BLOOD PRESSURE: 74 MMHG | OXYGEN SATURATION: 95 % | TEMPERATURE: 97.3 F | WEIGHT: 130 LBS | HEART RATE: 63 BPM | SYSTOLIC BLOOD PRESSURE: 174 MMHG

## 2021-04-13 DIAGNOSIS — K80.50 STONES COMMON DUCT: ICD-10-CM

## 2021-04-13 DIAGNOSIS — K80.10 CHRONIC CHOLECYSTITIS WITH CALCULUS: Primary | ICD-10-CM

## 2021-04-13 LAB
EKG ATRIAL RATE: 72 BPM
EKG P AXIS: 39 DEGREES
EKG P-R INTERVAL: 142 MS
EKG Q-T INTERVAL: 386 MS
EKG QRS DURATION: 86 MS
EKG QTC CALCULATION (BAZETT): 422 MS
EKG R AXIS: 7 DEGREES
EKG T AXIS: 47 DEGREES
EKG VENTRICULAR RATE: 72 BPM
SARS-COV-2, NAAT: NOT DETECTED

## 2021-04-13 PROCEDURE — 88304 TISSUE EXAM BY PATHOLOGIST: CPT

## 2021-04-13 PROCEDURE — 2580000003 HC RX 258: Performed by: ANESTHESIOLOGY

## 2021-04-13 PROCEDURE — 3700000001 HC ADD 15 MINUTES (ANESTHESIA): Performed by: SURGERY

## 2021-04-13 PROCEDURE — 2500000003 HC RX 250 WO HCPCS: Performed by: ANESTHESIOLOGY

## 2021-04-13 PROCEDURE — 3700000000 HC ANESTHESIA ATTENDED CARE: Performed by: SURGERY

## 2021-04-13 PROCEDURE — 93005 ELECTROCARDIOGRAM TRACING: CPT | Performed by: SURGERY

## 2021-04-13 PROCEDURE — 6360000004 HC RX CONTRAST MEDICATION: Performed by: SURGERY

## 2021-04-13 PROCEDURE — 3600000014 HC SURGERY LEVEL 4 ADDTL 15MIN: Performed by: SURGERY

## 2021-04-13 PROCEDURE — 76942 ECHO GUIDE FOR BIOPSY: CPT | Performed by: ANESTHESIOLOGY

## 2021-04-13 PROCEDURE — 6360000002 HC RX W HCPCS: Performed by: ANESTHESIOLOGY

## 2021-04-13 PROCEDURE — 7100000001 HC PACU RECOVERY - ADDTL 15 MIN: Performed by: SURGERY

## 2021-04-13 PROCEDURE — 6360000002 HC RX W HCPCS: Performed by: SURGERY

## 2021-04-13 PROCEDURE — 2580000003 HC RX 258: Performed by: SURGERY

## 2021-04-13 PROCEDURE — 7100000010 HC PHASE II RECOVERY - FIRST 15 MIN: Performed by: SURGERY

## 2021-04-13 PROCEDURE — 74300 X-RAY BILE DUCTS/PANCREAS: CPT

## 2021-04-13 PROCEDURE — 3600000004 HC SURGERY LEVEL 4 BASE: Performed by: SURGERY

## 2021-04-13 PROCEDURE — 47563 LAPARO CHOLECYSTECTOMY/GRAPH: CPT | Performed by: SURGERY

## 2021-04-13 PROCEDURE — 7100000000 HC PACU RECOVERY - FIRST 15 MIN: Performed by: SURGERY

## 2021-04-13 PROCEDURE — 87635 SARS-COV-2 COVID-19 AMP PRB: CPT

## 2021-04-13 PROCEDURE — C1729 CATH, DRAINAGE: HCPCS | Performed by: SURGERY

## 2021-04-13 PROCEDURE — 93010 ELECTROCARDIOGRAM REPORT: CPT | Performed by: INTERNAL MEDICINE

## 2021-04-13 PROCEDURE — 7100000011 HC PHASE II RECOVERY - ADDTL 15 MIN: Performed by: SURGERY

## 2021-04-13 PROCEDURE — 2709999900 HC NON-CHARGEABLE SUPPLY: Performed by: SURGERY

## 2021-04-13 RX ORDER — TRAMADOL HYDROCHLORIDE 50 MG/1
50 TABLET ORAL EVERY 6 HOURS PRN
Status: DISCONTINUED | OUTPATIENT
Start: 2021-04-13 | End: 2021-04-13 | Stop reason: HOSPADM

## 2021-04-13 RX ORDER — MIDAZOLAM HYDROCHLORIDE 1 MG/ML
INJECTION INTRAMUSCULAR; INTRAVENOUS PRN
Status: DISCONTINUED | OUTPATIENT
Start: 2021-04-13 | End: 2021-04-13 | Stop reason: SDUPTHER

## 2021-04-13 RX ORDER — ROCURONIUM BROMIDE 10 MG/ML
INJECTION, SOLUTION INTRAVENOUS PRN
Status: DISCONTINUED | OUTPATIENT
Start: 2021-04-13 | End: 2021-04-13 | Stop reason: SDUPTHER

## 2021-04-13 RX ORDER — ONDANSETRON 2 MG/ML
4 INJECTION INTRAMUSCULAR; INTRAVENOUS
Status: DISCONTINUED | OUTPATIENT
Start: 2021-04-13 | End: 2021-04-13 | Stop reason: HOSPADM

## 2021-04-13 RX ORDER — PROPOFOL 10 MG/ML
INJECTION, EMULSION INTRAVENOUS PRN
Status: DISCONTINUED | OUTPATIENT
Start: 2021-04-13 | End: 2021-04-13 | Stop reason: SDUPTHER

## 2021-04-13 RX ORDER — METOCLOPRAMIDE HYDROCHLORIDE 5 MG/ML
10 INJECTION INTRAMUSCULAR; INTRAVENOUS
Status: DISCONTINUED | OUTPATIENT
Start: 2021-04-13 | End: 2021-04-13 | Stop reason: HOSPADM

## 2021-04-13 RX ORDER — ONDANSETRON 2 MG/ML
INJECTION INTRAMUSCULAR; INTRAVENOUS PRN
Status: DISCONTINUED | OUTPATIENT
Start: 2021-04-13 | End: 2021-04-13 | Stop reason: SDUPTHER

## 2021-04-13 RX ORDER — MEPERIDINE HYDROCHLORIDE 25 MG/ML
12.5 INJECTION INTRAMUSCULAR; INTRAVENOUS; SUBCUTANEOUS EVERY 5 MIN PRN
Status: DISCONTINUED | OUTPATIENT
Start: 2021-04-13 | End: 2021-04-13 | Stop reason: HOSPADM

## 2021-04-13 RX ORDER — LABETALOL HYDROCHLORIDE 5 MG/ML
5 INJECTION, SOLUTION INTRAVENOUS ONCE
Status: COMPLETED | OUTPATIENT
Start: 2021-04-13 | End: 2021-04-13

## 2021-04-13 RX ORDER — SODIUM CHLORIDE 9 MG/ML
25 INJECTION, SOLUTION INTRAVENOUS PRN
Status: DISCONTINUED | OUTPATIENT
Start: 2021-04-13 | End: 2021-04-13 | Stop reason: HOSPADM

## 2021-04-13 RX ORDER — SODIUM CHLORIDE 0.9 % (FLUSH) 0.9 %
10 SYRINGE (ML) INJECTION EVERY 12 HOURS SCHEDULED
Status: DISCONTINUED | OUTPATIENT
Start: 2021-04-13 | End: 2021-04-13 | Stop reason: HOSPADM

## 2021-04-13 RX ORDER — MORPHINE SULFATE 2 MG/ML
1 INJECTION, SOLUTION INTRAMUSCULAR; INTRAVENOUS
Status: DISCONTINUED | OUTPATIENT
Start: 2021-04-13 | End: 2021-04-13 | Stop reason: HOSPADM

## 2021-04-13 RX ORDER — FENTANYL CITRATE 50 UG/ML
50 INJECTION, SOLUTION INTRAMUSCULAR; INTRAVENOUS EVERY 10 MIN PRN
Status: DISCONTINUED | OUTPATIENT
Start: 2021-04-13 | End: 2021-04-13 | Stop reason: HOSPADM

## 2021-04-13 RX ORDER — HYDROCODONE BITARTRATE AND ACETAMINOPHEN 5; 325 MG/1; MG/1
1 TABLET ORAL PRN
Status: DISCONTINUED | OUTPATIENT
Start: 2021-04-13 | End: 2021-04-13 | Stop reason: HOSPADM

## 2021-04-13 RX ORDER — MAGNESIUM HYDROXIDE 1200 MG/15ML
LIQUID ORAL CONTINUOUS PRN
Status: COMPLETED | OUTPATIENT
Start: 2021-04-13 | End: 2021-04-13

## 2021-04-13 RX ORDER — SODIUM CHLORIDE, SODIUM LACTATE, POTASSIUM CHLORIDE, CALCIUM CHLORIDE 600; 310; 30; 20 MG/100ML; MG/100ML; MG/100ML; MG/100ML
INJECTION, SOLUTION INTRAVENOUS CONTINUOUS
Status: DISCONTINUED | OUTPATIENT
Start: 2021-04-13 | End: 2021-04-13 | Stop reason: HOSPADM

## 2021-04-13 RX ORDER — HYDROCODONE BITARTRATE AND ACETAMINOPHEN 5; 325 MG/1; MG/1
2 TABLET ORAL PRN
Status: DISCONTINUED | OUTPATIENT
Start: 2021-04-13 | End: 2021-04-13 | Stop reason: HOSPADM

## 2021-04-13 RX ORDER — SODIUM CHLORIDE 0.9 % (FLUSH) 0.9 %
10 SYRINGE (ML) INJECTION PRN
Status: DISCONTINUED | OUTPATIENT
Start: 2021-04-13 | End: 2021-04-13 | Stop reason: HOSPADM

## 2021-04-13 RX ORDER — ONDANSETRON 2 MG/ML
4 INJECTION INTRAMUSCULAR; INTRAVENOUS EVERY 6 HOURS PRN
Status: DISCONTINUED | OUTPATIENT
Start: 2021-04-13 | End: 2021-04-13 | Stop reason: HOSPADM

## 2021-04-13 RX ORDER — FENTANYL CITRATE 50 UG/ML
INJECTION, SOLUTION INTRAMUSCULAR; INTRAVENOUS PRN
Status: DISCONTINUED | OUTPATIENT
Start: 2021-04-13 | End: 2021-04-13 | Stop reason: SDUPTHER

## 2021-04-13 RX ORDER — DIPHENHYDRAMINE HYDROCHLORIDE 50 MG/ML
12.5 INJECTION INTRAMUSCULAR; INTRAVENOUS
Status: DISCONTINUED | OUTPATIENT
Start: 2021-04-13 | End: 2021-04-13 | Stop reason: HOSPADM

## 2021-04-13 RX ORDER — ROPIVACAINE HYDROCHLORIDE 5 MG/ML
INJECTION, SOLUTION EPIDURAL; INFILTRATION; PERINEURAL PRN
Status: DISCONTINUED | OUTPATIENT
Start: 2021-04-13 | End: 2021-04-13 | Stop reason: SDUPTHER

## 2021-04-13 RX ORDER — KETOROLAC TROMETHAMINE 15 MG/ML
15 INJECTION, SOLUTION INTRAMUSCULAR; INTRAVENOUS
Status: COMPLETED | OUTPATIENT
Start: 2021-04-13 | End: 2021-04-13

## 2021-04-13 RX ORDER — PROMETHAZINE HYDROCHLORIDE 12.5 MG/1
12.5 TABLET ORAL EVERY 6 HOURS PRN
Status: DISCONTINUED | OUTPATIENT
Start: 2021-04-13 | End: 2021-04-13 | Stop reason: HOSPADM

## 2021-04-13 RX ORDER — OXYCODONE HYDROCHLORIDE 5 MG/1
5 TABLET ORAL EVERY 6 HOURS PRN
Qty: 20 TABLET | Refills: 0 | Status: SHIPPED | OUTPATIENT
Start: 2021-04-13 | End: 2021-04-18

## 2021-04-13 RX ORDER — CEFAZOLIN SODIUM 2 G/50ML
2000 SOLUTION INTRAVENOUS
Status: COMPLETED | OUTPATIENT
Start: 2021-04-13 | End: 2021-04-13

## 2021-04-13 RX ADMIN — ROPIVACAINE HYDROCHLORIDE 20 ML: 5 INJECTION, SOLUTION EPIDURAL; INFILTRATION; PERINEURAL at 11:12

## 2021-04-13 RX ADMIN — PROPOFOL 150 MG: 10 INJECTION, EMULSION INTRAVENOUS at 11:26

## 2021-04-13 RX ADMIN — ROCURONIUM BROMIDE 50 MG: 10 INJECTION INTRAVENOUS at 11:26

## 2021-04-13 RX ADMIN — ROPIVACAINE HYDROCHLORIDE 20 ML: 5 INJECTION, SOLUTION EPIDURAL; INFILTRATION; PERINEURAL at 11:15

## 2021-04-13 RX ADMIN — SODIUM CHLORIDE, POTASSIUM CHLORIDE, SODIUM LACTATE AND CALCIUM CHLORIDE: 600; 310; 30; 20 INJECTION, SOLUTION INTRAVENOUS at 10:52

## 2021-04-13 RX ADMIN — LABETALOL HYDROCHLORIDE 5 MG: 5 INJECTION, SOLUTION INTRAVENOUS at 13:58

## 2021-04-13 RX ADMIN — MIDAZOLAM HYDROCHLORIDE 2 MG: 2 INJECTION, SOLUTION INTRAMUSCULAR; INTRAVENOUS at 11:10

## 2021-04-13 RX ADMIN — SODIUM CHLORIDE, POTASSIUM CHLORIDE, SODIUM LACTATE AND CALCIUM CHLORIDE: 600; 310; 30; 20 INJECTION, SOLUTION INTRAVENOUS at 13:26

## 2021-04-13 RX ADMIN — CEFAZOLIN SODIUM 2000 MG: 2 SOLUTION INTRAVENOUS at 11:37

## 2021-04-13 RX ADMIN — KETOROLAC TROMETHAMINE 15 MG: 15 INJECTION, SOLUTION INTRAMUSCULAR; INTRAVENOUS at 10:52

## 2021-04-13 RX ADMIN — ONDANSETRON 4 MG: 2 INJECTION INTRAMUSCULAR; INTRAVENOUS at 11:30

## 2021-04-13 RX ADMIN — FENTANYL CITRATE 100 MCG: 50 INJECTION, SOLUTION INTRAMUSCULAR; INTRAVENOUS at 11:26

## 2021-04-13 RX ADMIN — LABETALOL HYDROCHLORIDE 5 MG: 5 INJECTION, SOLUTION INTRAVENOUS at 13:38

## 2021-04-13 ASSESSMENT — PULMONARY FUNCTION TESTS
PIF_VALUE: 16
PIF_VALUE: 13
PIF_VALUE: 19
PIF_VALUE: 20
PIF_VALUE: 20
PIF_VALUE: 15
PIF_VALUE: 19
PIF_VALUE: 20
PIF_VALUE: 20
PIF_VALUE: 0
PIF_VALUE: 20
PIF_VALUE: 13
PIF_VALUE: 19
PIF_VALUE: 14
PIF_VALUE: 20
PIF_VALUE: 21
PIF_VALUE: 21
PIF_VALUE: 2
PIF_VALUE: 20
PIF_VALUE: 1
PIF_VALUE: 21
PIF_VALUE: 12
PIF_VALUE: 14
PIF_VALUE: 20
PIF_VALUE: 19
PIF_VALUE: 20
PIF_VALUE: 1
PIF_VALUE: 19
PIF_VALUE: 14
PIF_VALUE: 20
PIF_VALUE: 18
PIF_VALUE: 14
PIF_VALUE: 1
PIF_VALUE: 13
PIF_VALUE: 19
PIF_VALUE: 20
PIF_VALUE: 19
PIF_VALUE: 19
PIF_VALUE: 21
PIF_VALUE: 20
PIF_VALUE: 13
PIF_VALUE: 13

## 2021-04-13 ASSESSMENT — PAIN DESCRIPTION - DESCRIPTORS: DESCRIPTORS: ACHING;BURNING

## 2021-04-13 ASSESSMENT — PAIN SCALES - GENERAL
PAINLEVEL_OUTOF10: 5
PAINLEVEL_OUTOF10: 0

## 2021-04-13 NOTE — ANESTHESIA PRE PROCEDURE
Department of Anesthesiology  Preprocedure Note       Name:  Zhang Thompson   Age:  76 y.o.  :  1955                                          MRN:  [id number]         Date:  2021      Surgeon: Aaliyah Wong):  Mio Mendoza MD    Procedure: Procedure(s):  LAPAROSCOPIC CHOLECYSTECTOMY  WITH CHOLANGIOGRAMS POSSIBLE OPEN CHOLECYSTECTOMY  REQUESTING TO FOLLOW - 1:00 PM (UPDATE LABS DONE 21), (COVID ON ADMIT)    Medications prior to admission:   Prior to Admission medications    Medication Sig Start Date End Date Taking? Authorizing Provider   oxyCODONE-acetaminophen (PERCOCET) 7.5-325 MG per tablet Take 1 tablet by mouth every 6 hours as needed for Pain for up to 5 days. Start taking after your other med is gone 21  Mio Mendoza MD   ondansetron (ZOFRAN-ODT) 4 MG disintegrating tablet Place 1 tablet under the tongue every 8 hours as needed for Nausea or Vomiting May Sub regular tablet (non-ODT) if insurance does not cover ODT. 21  Jamir Herrera MD   dicyclomine (BENTYL) 10 MG capsule Take 1 capsule by mouth every 6 hours as needed (cramps)  Patient not taking: Reported on 2021   Jayshree Portillo PA-C   clopidogrel (PLAVIX) 75 MG tablet Take 75 mg by mouth daily    Historical Provider, MD   lisinopril (PRINIVIL;ZESTRIL) 10 MG tablet Take by mouth daily    Historical Provider, MD   albuterol sulfate HFA (PROVENTIL HFA) 108 (90 BASE) MCG/ACT inhaler Inhale 1-2 puffs into the lungs every 4 hours as needed for Wheezing or Shortness of Breath (Space out to every 6 hours as symptoms improve) Space out to every 6 hours as symptoms improve.   Patient not taking: Reported on 2021   Eduarda Fischer MD   PHENobarbital (LUMINAL) 32.4 MG tablet Take 129.6 mg by mouth 2 times daily    Historical Provider, MD   ibuprofen (ADVIL;MOTRIN) 800 MG tablet Take 1 tablet by mouth every 8 hours as needed for Pain or Fever 10/19/16   Leigh Dance, DO Current medications:    Current Facility-Administered Medications   Medication Dose Route Frequency Provider Last Rate Last Admin    meperidine (DEMEROL) injection 12.5 mg  12.5 mg Intravenous Q5 Min PRN Marcia Bolivar MD        fentaNYL (SUBLIMAZE) injection 50 mcg  50 mcg Intravenous Q10 Min PRN Marcia Bolivar MD        HYDROmorphone (DILAUDID) injection 0.5 mg  0.5 mg Intravenous Q10 Min PRN Marcia Bolivar MD        HYDROcodone-acetaminophen [facility]) 5-325 MG per tablet 1 tablet  1 tablet Oral PRN Marcia Bolivar MD        Or    HYDROcodone-acetaminophen [facility]) 5-325 MG per tablet 2 tablet  2 tablet Oral PRN Marcia Bolivar MD        ondansetron [facility]) injection 4 mg  4 mg Intravenous Once PRN Marcia Bolivar MD        metoclopramide [facility]) injection 10 mg  10 mg Intravenous Once PRN Marcia Bolivar MD        diphenhydrAMINE (BENADRYL) injection 12.5 mg  12.5 mg Intravenous Once PRN Marcia Bolivar MD         Current Outpatient Medications   Medication Sig Dispense Refill    oxyCODONE-acetaminophen (PERCOCET) 7.5-325 MG per tablet Take 1 tablet by mouth every 6 hours as needed for Pain for up to 5 days. Start taking after your other med is gone 20 tablet 0    ondansetron (ZOFRAN-ODT) 4 MG disintegrating tablet Place 1 tablet under the tongue every 8 hours as needed for Nausea or Vomiting May Sub regular tablet (non-ODT) if insurance does not cover ODT.  20 tablet 0    dicyclomine (BENTYL) 10 MG capsule Take 1 capsule by mouth every 6 hours as needed (cramps) (Patient not taking: Reported on 4/8/2021) 20 capsule 0    clopidogrel (PLAVIX) 75 MG tablet Take 75 mg by mouth daily      lisinopril (PRINIVIL;ZESTRIL) 10 MG tablet Take by mouth daily      albuterol sulfate HFA (PROVENTIL HFA) 108 (90 BASE) MCG/ACT inhaler Inhale 1-2 puffs into the lungs every 4 hours as needed for Wheezing or Shortness of Breath (Space out to every 6 hours as symptoms improve) Space out to every 6 hours as symptoms improve. (Patient not taking: Reported on 4/8/2021) 1 Inhaler 0    PHENobarbital (LUMINAL) 32.4 MG tablet Take 129.6 mg by mouth 2 times daily      ibuprofen (ADVIL;MOTRIN) 800 MG tablet Take 1 tablet by mouth every 8 hours as needed for Pain or Fever 20 tablet 0       Allergies:  No Known Allergies    Problem List:    Patient Active Problem List   Diagnosis Code    Chronic cholecystitis with calculus K80.10       Past Medical History:        Diagnosis Date    Back pain     chronic    Epilepsy ([facility] [address]     Hyperlipidemia     Hypertension     Shingles        Past Surgical History:        Procedure Laterality Date    PENILE PROSTHESIS         Social History:    Social History     Tobacco Use    Smoking status: Current Every Day Smoker     Packs/day: 1.00     Years: 10.00     Pack years: 10.00     Types: Cigarettes    Smokeless tobacco: Never Used   Substance Use Topics    Alcohol use: Not Currently                                Ready to quit: Not Answered  Counseling given: Not Answered      Vital Signs (Current): There were no vitals filed for this visit.                                            BP Readings from Last 3 Encounters:   04/08/21 116/62   04/07/21 135/78   04/06/21 135/78       NPO Status:                                                                                 BMI:   Wt Readings from Last 3 Encounters:   04/08/21 130 lb 3.2 oz (59.1 kg)   04/06/21 130 lb (59 kg)   04/05/21 135 lb (61.2 kg)     There is no height or weight on file to calculate BMI.    CBC:   Lab Results   Component Value Date    WBC 9.5 04/05/2021    RBC 4.37 04/05/2021    HGB 13.3 04/05/2021    HCT 39.0 04/05/2021    MCV 89.2 04/05/2021    RDW 13.3 04/05/2021     04/05/2021       CMP:   Lab Results   Component Value Date     04/05/2021    K 3.8 04/05/2021    CL 90 04/05/2021    CO2 27 04/05/2021    BUN 21 04/05/2021    CREATININE 1.1 04/05/2021    CREATININE

## 2021-04-13 NOTE — BRIEF OP NOTE
Brief Postoperative Note      Patient: Ivonne Carreno  YOB: 1955  MRN: [id number]    Date of Procedure: 4/13/2021    Pre-Op Diagnosis: GALLSTONES    Post-Op Diagnosis: Same       Procedure(s):  LAPAROSCOPIC CHOLECYSTECTOMY  WITH CHOLANGIOGRAMS    Surgeon(s):  Eugenio Gould MD    Assistant:  First Assistant: Sherice Bardales    Anesthesia: General    Estimated Blood Loss (mL): Minimal    Complications: None    Specimens:   ID Type Source Tests Collected by Time Destination   A : GALLBLADDER Tissue Gallbladder SURGICAL PATHOLOGY Eugenio Gould MD 4/13/2021 1150        Implants:  * No implants in log *      Drains:   Closed/Suction Drain RUQ Bulb 15 [place] (Active)   Dressing Status Clean;Dry; Intact 04/13/21 1207       Findings: Acute and chronic cholecystitis with cholelithiasis    Electronically signed by Eugenio Gould MD on 4/13/2021 at 12:50 PM

## 2021-04-13 NOTE — INTERVAL H&P NOTE
Update History & Physical    The patient's History and Physical of April 8, 2021 was reviewed with the patient and I examined the patient. There was no change. The surgical site was confirmed by the patient and me. Plan: The risks, benefits, expected outcome, and alternative to the recommended procedure have been discussed with the patient. Patient understands and wants to proceed with the procedure.      Electronically signed by Nikolay Ray MD on 4/13/2021 at 10:29 AM

## 2021-04-13 NOTE — FLOWSHEET NOTE
Dr. Jolly Madrigal in to see patient, per Dr. Jolly Madrgial to keep drain in until post op follow up appointment, may resume plavix tomorrow.

## 2021-04-13 NOTE — ANESTHESIA POSTPROCEDURE EVALUATION
Department of Anesthesiology  Postprocedure Note    Patient: Sudha Bocanegra  MRN: [id number]  [place]: 1955  Date of evaluation: 4/13/2021  Time:  1:01 PM     Procedure Summary     Date: 04/13/21 Room / Location: [address]    Anesthesia Start: 1126 Anesthesia Stop:     Procedure: LAPAROSCOPIC CHOLECYSTECTOMY  WITH CHOLANGIOGRAMS (N/A ) Diagnosis: (GALLSTONES)    Surgeons: Jena Arredondo MD Responsible Provider:     Anesthesia Type: general ASA Status: 3          Anesthesia Type: No value filed. Fox Phase I: Fox Score: 10    Fox Phase II:      Last vitals: Reviewed and per EMR flowsheets.        Anesthesia Post Evaluation    Patient location during evaluation: PACU  Patient participation: complete - patient participated  Level of consciousness: awake  Pain score: 0  Airway patency: patent  Nausea & Vomiting: no nausea  Complications: no  Respiratory status: acceptable  Hydration status: euvolemic

## 2021-04-13 NOTE — PROGRESS NOTES
Renal Adjustment Per Protocol: Toradol 30mg IV preop changed to Toradol 15mg IV preop based on max dosing recommendation for patients 65 years and older. CESAR Gentile Ph.  4/13/2021  2:24 AM

## 2021-04-13 NOTE — ANESTHESIA PROCEDURE NOTES
Peripheral Block    Patient location during procedure: pre-op  Staffing  Anesthesiologist: Rosario Ye MD  Preanesthetic Checklist  Completed: patient identified, IV checked, site marked, risks and benefits discussed, surgical consent, monitors and equipment checked, pre-op evaluation, timeout performed, anesthesia consent given, oxygen available and patient being monitored  Peripheral Block  Patient position: sitting  Prep: ChloraPrep  Patient monitoring: cardiac monitor, continuous pulse ox, frequent blood pressure checks and IV access  Block type: Erector spinae  Laterality: bilateral  Injection technique: single-shot  Guidance: ultrasound guided  Local infiltration: ropivacaine  Infiltration strength: 0.25 %  Dose: 30 mL  Provider prep: mask and sterile gloves  Local infiltration: ropivacaine  Needle  Needle type: combined needle/nerve stimulator   Needle gauge: 21 G  Needle length: 10 cm  Needle localization: ultrasound guidance  Test dose: negative  Assessment  Injection assessment: negative aspiration for heme, no paresthesia on injection and local visualized surrounding nerve on ultrasound  Paresthesia pain: none  Slow fractionated injection: yes  Hemodynamics: stable  Reason for block: post-op pain management

## 2021-04-13 NOTE — OP NOTE
[address]                      [facility], [address] [facility]                                OPERATIVE REPORT    PATIENT NAME: Haleigh Hernandez                     :        1955  MED REC NO:   [id number]                            ROOM:  ACCOUNT NO:   [de-identified]                           ADMIT DATE: 2021  PROVIDER:     Thelma Trevino MD    DATE OF PROCEDURE:  2021    PREOPERATIVE DIAGNOSES:  Chronic cholecystitis with cholelithiasis. POSTOPERATIVE DIAGNOSES:  Chronic cholecystitis with cholelithiasis with  an acute component. OPERATION PERFORMED:  Laparoscopic cholecystectomy with cholangiograms. SURGEON:  Thelma Trevino MD    ANESTHESIA:  General.    ESTIMATED BLOOD LOSS:  Minimal.    INDICATIONS:  The patient is a 58-year-old male who has symptomatic  gallbladder disease and had recently had stents placed in some arteries  and is on Plavix. Initially, surgery was going to be held off for 3  months until he recuperated from his stent surgery, but due to symptoms  the surgery had to be moved up. The Plavix was stopped for several days  prior to the procedure. The procedure of a laparoscopic as well as an open procedure were discussed. Risks and complications including  but not exclusive to infection, blood loss, damage to surrounding  structures, bile leakage, stone spillage and even the possibility of  needing further surgery in the future were all discussed. He understood  and was agreeable to the procedure. OPERATIVE PROCEDURE:  The patient brought in the operative suite, placed  in the supine position where anesthesia was induced and the patient was  intubated. He was given erector spinae blocks by Anesthesia. The  abdomen was prepped and draped in a sterile fashion and time-out called. The patient and procedure were properly identified. Afterwards, the  skin was tented up just above the umbilicus.   A small transverse  incision

## 2021-04-15 ENCOUNTER — OFFICE VISIT (OUTPATIENT)
Dept: SURGERY | Age: 66
End: 2021-04-15

## 2021-04-15 VITALS
SYSTOLIC BLOOD PRESSURE: 130 MMHG | BODY MASS INDEX: 21.49 KG/M2 | HEIGHT: 65 IN | DIASTOLIC BLOOD PRESSURE: 82 MMHG | WEIGHT: 129 LBS

## 2021-04-15 DIAGNOSIS — K80.10 CHRONIC CHOLECYSTITIS WITH CALCULUS: Primary | ICD-10-CM

## 2021-04-15 DIAGNOSIS — K81.2 ACUTE CHOLECYSTITIS WITH CHRONIC CHOLECYSTITIS: ICD-10-CM

## 2021-04-15 PROCEDURE — 99024 POSTOP FOLLOW-UP VISIT: CPT | Performed by: SURGERY
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
## 2021-04-29 ENCOUNTER — OFFICE VISIT (OUTPATIENT)
Dept: SURGERY | Age: 66
End: 2021-04-29

## 2021-04-29 VITALS
SYSTOLIC BLOOD PRESSURE: 112 MMHG | HEIGHT: 65 IN | TEMPERATURE: 97.8 F | BODY MASS INDEX: 21.16 KG/M2 | DIASTOLIC BLOOD PRESSURE: 68 MMHG | WEIGHT: 127 LBS

## 2021-04-29 DIAGNOSIS — K81.2 ACUTE CHOLECYSTITIS WITH CHRONIC CHOLECYSTITIS: Primary | ICD-10-CM

## 2021-04-29 PROCEDURE — 99024 POSTOP FOLLOW-UP VISIT: CPT | Performed by: SURGERY

## 2021-04-29 NOTE — PROGRESS NOTES
Caio Dao (:  1955) is a 76 y.o. male,Established patient, here for evaluation of the following chief complaint(s):  Post-Op Check (post op Lap ann)      ASSESSMENT/PLAN:  Doing well    The patient is instructed to return to see me as needed    SUBJECTIVE/OBJECTIVE:  HPI    Caio Dao returns in follow up of laparoscopic cholecystectomy with cholangiograms performed on 2021. Intra-operative cholangiogram was normal .The pathology report shows acute cholecystitis with stones. Pain is rated as a 1. His appetite is Good. The patient denies juandice, fever, chills or sweats. The patient has returned to normal activities. Review of Systems    Physical Exam  Constitutional:       General: He is not in acute distress. Appearance: Normal appearance. HENT:      Mouth/Throat:      Mouth: Mucous membranes are moist.      Pharynx: Oropharynx is clear. Eyes:      Pupils: Pupils are equal, round, and reactive to light. Neck:      Comments: Neck is supple without any masses, no thyromegaly, trachea midline  Abdominal:          Comments: Incisions are well approximated, erythema is not present, swelling is mild, no evidence of hernia, tenderness is mild, drainage is not present, skin glue/sutures are present   Musculoskeletal:      Comments: Normal gait   Skin:     Findings: No bruising, lesion or rash. Neurological:      Mental Status: He is alert and oriented to person, place, and time. Psychiatric:         Mood and Affect: Mood normal.         Judgment: Judgment normal.       /68   Temp 97.8 °F (36.6 °C) (Temporal)   Ht 5' 5\" (1.651 m)   Wt 127 lb (57.6 kg)   BMI 21.13 kg/m²           An electronic signature was used to authenticate this note.     --Jn Carlos MD

## 2021-10-15 ENCOUNTER — APPOINTMENT (OUTPATIENT)
Dept: GENERAL RADIOLOGY | Age: 66
End: 2021-10-15
Payer: MEDICARE

## 2021-10-15 ENCOUNTER — HOSPITAL ENCOUNTER (EMERGENCY)
Age: 66
Discharge: HOME | End: 2021-10-15
Payer: MEDICARE

## 2021-10-15 VITALS
SYSTOLIC BLOOD PRESSURE: 152 MMHG | RESPIRATION RATE: 16 BRPM | TEMPERATURE: 96.4 F | HEIGHT: 66 IN | WEIGHT: 103 LBS | HEART RATE: 68 BPM | DIASTOLIC BLOOD PRESSURE: 69 MMHG | OXYGEN SATURATION: 98 % | BODY MASS INDEX: 16.55 KG/M2

## 2021-10-15 DIAGNOSIS — S42.212A CLOSED DISPLACED FRACTURE OF SURGICAL NECK OF LEFT HUMERUS, UNSPECIFIED FRACTURE MORPHOLOGY, INITIAL ENCOUNTER: Primary | ICD-10-CM

## 2021-10-15 LAB
ABO/RH: NORMAL
ALBUMIN SERPL-MCNC: 4.3 G/DL (ref 3.5–4.6)
ALP BLD-CCNC: 162 U/L (ref 35–104)
ALT SERPL-CCNC: 27 U/L (ref 0–41)
ANION GAP SERPL CALCULATED.3IONS-SCNC: 10 MEQ/L (ref 9–15)
ANTIBODY SCREEN: NORMAL
APTT: 40.5 SEC (ref 24.4–36.8)
AST SERPL-CCNC: 23 U/L (ref 0–40)
BASOPHILS ABSOLUTE: 0.1 K/UL (ref 0–0.2)
BASOPHILS RELATIVE PERCENT: 0.6 %
BILIRUB SERPL-MCNC: <0.2 MG/DL (ref 0.2–0.7)
BUN BLDV-MCNC: 16 MG/DL (ref 8–23)
CALCIUM SERPL-MCNC: 8.9 MG/DL (ref 8.5–9.9)
CHLORIDE BLD-SCNC: 96 MEQ/L (ref 95–107)
CO2: 24 MEQ/L (ref 20–31)
CREAT SERPL-MCNC: 1.02 MG/DL (ref 0.7–1.2)
EOSINOPHILS ABSOLUTE: 0.2 K/UL (ref 0–0.7)
EOSINOPHILS RELATIVE PERCENT: 1.9 %
GFR AFRICAN AMERICAN: >60
GFR NON-AFRICAN AMERICAN: >60
GLOBULIN: 2.3 G/DL (ref 2.3–3.5)
GLUCOSE BLD-MCNC: 114 MG/DL (ref 70–99)
HCT VFR BLD CALC: 40.5 % (ref 42–52)
HEMOGLOBIN: 13.4 G/DL (ref 14–18)
INR BLD: 1.9
LYMPHOCYTES ABSOLUTE: 1.4 K/UL (ref 1–4.8)
LYMPHOCYTES RELATIVE PERCENT: 12.4 %
MCH RBC QN AUTO: 29.2 PG (ref 27–31.3)
MCHC RBC AUTO-ENTMCNC: 33 % (ref 33–37)
MCV RBC AUTO: 88.4 FL (ref 80–100)
MONOCYTES ABSOLUTE: 0.8 K/UL (ref 0.2–0.8)
MONOCYTES RELATIVE PERCENT: 6.8 %
NEUTROPHILS ABSOLUTE: 9.1 K/UL (ref 1.4–6.5)
NEUTROPHILS RELATIVE PERCENT: 78.3 %
PDW BLD-RTO: 18.1 % (ref 11.5–14.5)
PLATELET # BLD: 260 K/UL (ref 130–400)
POTASSIUM SERPL-SCNC: 4.3 MEQ/L (ref 3.4–4.9)
PROTHROMBIN TIME: 21.2 SEC (ref 12.3–14.9)
RBC # BLD: 4.58 M/UL (ref 4.7–6.1)
SODIUM BLD-SCNC: 130 MEQ/L (ref 135–144)
TOTAL PROTEIN: 6.6 G/DL (ref 6.3–8)
WBC # BLD: 11.7 K/UL (ref 4.8–10.8)

## 2021-10-15 PROCEDURE — 85730 THROMBOPLASTIN TIME PARTIAL: CPT

## 2021-10-15 PROCEDURE — 80053 COMPREHEN METABOLIC PANEL: CPT

## 2021-10-15 PROCEDURE — 99285 EMERGENCY DEPT VISIT HI MDM: CPT

## 2021-10-15 PROCEDURE — 85025 COMPLETE CBC W/AUTO DIFF WBC: CPT

## 2021-10-15 PROCEDURE — 6370000000 HC RX 637 (ALT 250 FOR IP): Performed by: PERSONAL EMERGENCY RESPONSE ATTENDANT

## 2021-10-15 PROCEDURE — 6360000002 HC RX W HCPCS: Performed by: PERSONAL EMERGENCY RESPONSE ATTENDANT

## 2021-10-15 PROCEDURE — 96374 THER/PROPH/DIAG INJ IV PUSH: CPT

## 2021-10-15 PROCEDURE — 36415 COLL VENOUS BLD VENIPUNCTURE: CPT

## 2021-10-15 PROCEDURE — 93005 ELECTROCARDIOGRAM TRACING: CPT | Performed by: PERSONAL EMERGENCY RESPONSE ATTENDANT

## 2021-10-15 PROCEDURE — 86850 RBC ANTIBODY SCREEN: CPT

## 2021-10-15 PROCEDURE — 86901 BLOOD TYPING SEROLOGIC RH(D): CPT

## 2021-10-15 PROCEDURE — 86900 BLOOD TYPING SEROLOGIC ABO: CPT

## 2021-10-15 PROCEDURE — 96375 TX/PRO/DX INJ NEW DRUG ADDON: CPT

## 2021-10-15 PROCEDURE — 85610 PROTHROMBIN TIME: CPT

## 2021-10-15 PROCEDURE — 73030 X-RAY EXAM OF SHOULDER: CPT

## 2021-10-15 RX ORDER — MORPHINE SULFATE 2 MG/ML
4 INJECTION, SOLUTION INTRAMUSCULAR; INTRAVENOUS ONCE
Status: COMPLETED | OUTPATIENT
Start: 2021-10-15 | End: 2021-10-15

## 2021-10-15 RX ORDER — OXYCODONE HYDROCHLORIDE AND ACETAMINOPHEN 5; 325 MG/1; MG/1
1-2 TABLET ORAL EVERY 6 HOURS PRN
Qty: 10 TABLET | Refills: 0 | Status: SHIPPED | OUTPATIENT
Start: 2021-10-15 | End: 2021-10-18

## 2021-10-15 RX ORDER — ONDANSETRON 2 MG/ML
4 INJECTION INTRAMUSCULAR; INTRAVENOUS ONCE
Status: COMPLETED | OUTPATIENT
Start: 2021-10-15 | End: 2021-10-15

## 2021-10-15 RX ORDER — DICYCLOMINE HYDROCHLORIDE 10 MG/1
10 CAPSULE ORAL ONCE
Status: COMPLETED | OUTPATIENT
Start: 2021-10-15 | End: 2021-10-15

## 2021-10-15 RX ADMIN — DICYCLOMINE HYDROCHLORIDE 10 MG: 10 CAPSULE ORAL at 20:50

## 2021-10-15 RX ADMIN — ONDANSETRON 4 MG: 2 INJECTION INTRAMUSCULAR; INTRAVENOUS at 19:18

## 2021-10-15 RX ADMIN — MORPHINE SULFATE 4 MG: 2 INJECTION, SOLUTION INTRAMUSCULAR; INTRAVENOUS at 19:17

## 2021-10-15 ASSESSMENT — PAIN DESCRIPTION - LOCATION: LOCATION: SHOULDER

## 2021-10-15 ASSESSMENT — ENCOUNTER SYMPTOMS
VOMITING: 0
RHINORRHEA: 0
SORE THROAT: 0
SHORTNESS OF BREATH: 0
NAUSEA: 0
COUGH: 0
BLOOD IN STOOL: 0
ABDOMINAL PAIN: 0
COLOR CHANGE: 0
DIARRHEA: 0

## 2021-10-15 ASSESSMENT — PAIN DESCRIPTION - PAIN TYPE: TYPE: ACUTE PAIN

## 2021-10-15 ASSESSMENT — PAIN DESCRIPTION - ORIENTATION: ORIENTATION: LEFT

## 2021-10-15 ASSESSMENT — PAIN SCALES - GENERAL
PAINLEVEL_OUTOF10: 10
PAINLEVEL_OUTOF10: 0

## 2021-10-15 NOTE — ED PROVIDER NOTES
[address] [facility] ED  eMERGENCY dEPARTMENT eNCOUnter      Pt Name: Jimmy Shankar  MRN: [id number]  [place]cepciontrongfsam 1955  Date of evaluation: 10/15/2021  Provider: Aslhey Lomeli, [facility] Kavon Rocha is a 78 y.o. male with PMHx of back pain, epilepsy, hyperlipidemia, hypertension, shingles, A. Fib, CAD, anemia presents to the emergency department with fall. Patient states he was walking the dog when it pulled on him and he fell on his left shoulder. He is on Coumadin. He denies head injuries or loss of consciousness. His only complaint is left shoulder pain. He denies headache, neck pain, back pain, chest pain, shortness of breath, abdominal pain, nausea, vomiting, pelvic pain, leg pain.  is his orthopedic MD.     HPI    Nursing Notes were reviewed. REVIEW OF SYSTEMS       Review of Systems   Constitutional: Negative for appetite change, chills and fever. HENT: Negative for congestion, rhinorrhea and sore throat. Respiratory: Negative for cough and shortness of breath. Cardiovascular: Negative for chest pain. Gastrointestinal: Negative for abdominal pain, blood in stool, diarrhea, nausea and vomiting. Genitourinary: Negative for difficulty urinating. Musculoskeletal: Positive for arthralgias. Negative for neck stiffness. Skin: Negative for color change and rash. Neurological: Negative for dizziness, syncope, weakness, light-headedness, numbness and headaches. All other systems reviewed and are negative.             PAST MEDICAL HISTORY     Past Medical History:   Diagnosis Date    Anemia     Back pain     chronic    CAD (coronary artery disease)     [facility]osed fracture of neck of left femur ([facility]     Epilepsy ([facility]     Hyperlipidemia     Hypertension     Paroxysmal A-fib ([facility]     Shingles          SURGICAL HISTORY       Past Surgical History:   Procedure Laterality Date    CAROTID ENDARTERECTOMY      CHOLECYSTECTOMY, LAPAROSCOPIC N/A 04/13/2021    LAPAROSCOPIC CHOLECYSTECTOMY  WITH CHOLANGIOGRAMS performed by Vicente Wagner MD at [facility]       Previous Medications    CLOPIDOGREL (PLAVIX) 75 MG TABLET    Take 75 mg by mouth daily    DICYCLOMINE (BENTYL) 10 MG CAPSULE    Take 1 capsule by mouth every 6 hours as needed (cramps)    IBUPROFEN (ADVIL;MOTRIN) 800 MG TABLET    Take 1 tablet by mouth every 8 hours as needed for Pain or Fever    LISINOPRIL (PRINIVIL;ZESTRIL) 10 MG TABLET    Take by mouth daily    PHENOBARBITAL (LUMINAL) 32.4 MG TABLET    Take 129.6 mg by mouth 2 times daily       ALLERGIES     Penicillins    FAMILY HISTORY     History reviewed. No pertinent family history. SOCIAL HISTORY       Social History     Socioeconomic History    Marital status:      Spouse name: None    Number of children: None    Years of education: None    Highest education level: None   Occupational History    None   Tobacco Use    Smoking status: Current Every Day Smoker     Packs/day: 1.00     Years: 10.00     Pack years: 10.00     Types: Cigarettes    Smokeless tobacco: Never Used   Vaping Use    Vaping Use: Never used   Substance and Sexual Activity    Alcohol use: Not Currently    Drug use: Never    Sexual activity: None   Other Topics Concern    None   Social History Narrative    None     Social Determinants of Health     Financial Resource Strain:     Difficulty of Paying Living Expenses:    Food Insecurity:     Worried About Running Out of Food in the Last Year:     Ran Out of Food in the Last Year:    Transportation Needs:     Lack of Transportation (Medical):      Lack of Transportation (Non-Medical):    Physical Activity:     Days of Exercise per Week:     Minutes of Exercise per Session:    Stress:     Feeling of Stress :    Social Connections:     Frequency of Communication with Friends and Family:     Frequency of Social Gatherings with Friends and Family:  Attends [employer] Services:     Active Member of Clubs or Organizations:     Attends Club or Organization Meetings:     Marital Status:    Intimate Partner Violence:     Fear of Current or Ex-Partner:     Emotionally Abused:     Physically Abused:     Sexually Abused:          PHYSICAL EXAM         ED Triage Vitals [10/15/21 1753]   BP Temp Temp Source Pulse Resp SpO2 Height Weight   (!) 140/61 96.4 °F (35.8 °C) Temporal 57 18 96 % 5' 6\" (1.676 m) 103 lb (46.7 kg)       Physical Exam  Constitutional:       Appearance: He is well-developed. HENT:      Head: Normocephalic and atraumatic. Eyes:      Conjunctiva/sclera: Conjunctivae normal.      Pupils: Pupils are equal, round, and reactive to light. Neck:      Trachea: No tracheal deviation. Comments: No midline C, T, L spinous process tenderness, no paraspinal muscle tenderness, no signs of trauma  Cardiovascular:      Heart sounds: Normal heart sounds. Pulmonary:      Effort: Pulmonary effort is normal. No respiratory distress. Breath sounds: Normal breath sounds. No stridor. Abdominal:      General: Bowel sounds are normal. There is no distension. Palpations: Abdomen is soft. There is no mass. Tenderness: There is no abdominal tenderness. There is no guarding or rebound. Musculoskeletal:         General: Tenderness present. Normal range of motion. Cervical back: Normal range of motion and neck supple. No tenderness. Comments: Moderate left shoulder tenderness to palpation. Skin tear to left elbow with no bony tenderness. MSP intact distally. Skin:     General: Skin is warm and dry. Capillary Refill: Capillary refill takes less than 2 seconds. Findings: No rash. Neurological:      Mental Status: He is alert and oriented to person, place, and time. Deep Tendon Reflexes: Reflexes are normal and symmetric. Psychiatric:         Behavior: Behavior normal.         Thought Content:  Thought content normal.         Judgment: Judgment normal.         DIAGNOSTIC RESULTS     EKG:All EKG's are interpreted by the Emergency Department Physician who either signs or Co-signs this chart in the absence of a cardiologist.    Sinus rhythm with PVC, rate 63, no ST segment changes    RADIOLOGY:   Non-plain film images such as CT, Ultrasound and MRI are read by theradiologist. Plain radiographic images are visualized and preliminarily interpreted by the emergency physician with the below findings:    Interpretation per theRadiologist below, if available at the time of this note:    XR SHOULDER LEFT (MIN 2 VIEWS)   Final Result   There are comminuted fractures of the proximal humerus with mild impaction. LABS:  Labs Reviewed   APTT - Abnormal; Notable for the following components:       Result Value    aPTT 40.5 (*)     All other components within normal limits   CBC WITH AUTO DIFFERENTIAL - Abnormal; Notable for the following components:    WBC 11.7 (*)     RBC 4.58 (*)     Hemoglobin 13.4 (*)     Hematocrit 40.5 (*)     RDW 18.1 (*)     Neutrophils Absolute 9.1 (*)     All other components within normal limits   COMPREHENSIVE METABOLIC PANEL - Abnormal; Notable for the following components:    Glucose 114 (*)     Alkaline Phosphatase 162 (*)     All other components within normal limits   PROTIME-INR - Abnormal; Notable for the following components:    Protime 21.2 (*)     All other components within normal limits   TYPE AND SCREEN       All other labs were within normal range or not returned as of this dictation. EMERGENCY DEPARTMENT COURSE and DIFFERENTIAL DIAGNOSIS/MDM:   Vitals:    Vitals:    10/15/21 1753   BP: (!) 140/61   Pulse: 57   Resp: 18   Temp: 96.4 °F (35.8 °C)   TempSrc: Temporal   SpO2: 96%   Weight: 103 lb (46.7 kg)   Height: 5' 6\" (1.676 m)         MDM    Left shoulder x-ray shows comminuted fractures through the surgical neck of the proximal humerus with mild impaction. INR 1.9.  I spoke to Celestina Severin covering for Joey who says he can follow up outpatient. Pt placed in sling and sent home with percocet and aware to ice the area and call Joey tomorrow. Standard anticipatory guidance given to patient upon discharge. Have given them a specific time frame in which to follow-up and who to follow-up with. I have also advised them that they should return to the emergency department if they get worse, or not getting better or develop any new or concerning symptoms. Patient demonstrates understanding. CRITICAL CARE TIME   Total Critical Caretime was 0 minutes, excluding separately reportable procedures. There was a high probability of clinically significant/life threatening deterioration in the patient's condition which required my urgent intervention. Procedures    FINAL IMPRESSION      1. Closed displaced fracture of surgical neck of left humerus, unspecified fracture morphology, initial encounter          DISPOSITION/PLAN   DISPOSITION Decision To Discharge 10/15/2021 08:56:29 PM      PATIENT REFERRED TO:  Call  tomorrow morning            DISCHARGE MEDICATIONS:  New Prescriptions    OXYCODONE-ACETAMINOPHEN (PERCOCET) 5-325 MG PER TABLET    Take 1-2 tablets by mouth every 6 hours as needed for Pain for up to 3 days. WARNING:  May cause drowsiness. May impair ability to operate vehicles or machinery. Do not use in combination with alcohol. (Please notethat portions of this note were completed with a voice recognition program.  Efforts were made to edit the dictations but occasionally words are mis-transcribed. )    MT Charles (electronically signed)  Emergency Physician Assistant         Hattie Nicole, [address]  10/15/21 7562

## 2021-10-15 NOTE — DISCHARGE INSTR - COC
Continuity of Care Form    Patient Name: Cam aWy   :    MRN:  [id number]    Admit date:  10/15/2021  Discharge date:  ***    Code Status Order: Prior   Advance Directives:     Admitting Physician:  No admitting provider for patient encounter. PCP: Delicia Tejada DO    Discharging Nurse: [facility] Unit/Room#:   Discharging Unit Phone Number: ***    Emergency Contact:   Extended Emergency Contact Information  Primary Emergency Contact: [address] [place] [address] Phone: [phone]  Relation: Child  Secondary Emergency Contact: herson yousif  [employer] Phone: [phone]  Relation: Spouse  Preferred language: English   needed?  No    Past Surgical History:  Past Surgical History:   Procedure Laterality Date    CHOLECYSTECTOMY, LAPAROSCOPIC N/A 2021    LAPAROSCOPIC CHOLECYSTECTOMY  WITH CHOLANGIOGRAMS performed by Jaden Cano MD at [place] [address]         Immunization History:   Immunization History   Administered Date(s) Administered    COVID-19, TollesonANASTASIIA Flor, 100mcg/0.5mL 2021, 2021       Active Problems:  Patient Active Problem List   Diagnosis Code    Chronic cholecystitis with calculus K80.10    Acute cholecystitis with chronic cholecystitis K81.2       Isolation/Infection:   Isolation          No Isolation        Patient Infection Status     None to display          Nurse Assessment:  Last Vital Signs: BP (!) 140/61   Pulse 57   Temp 96.4 °F (35.8 °C) (Temporal)   Resp 18   Ht 5' 6\" (1.676 m)   Wt 103 lb (46.7 kg)   SpO2 96%   BMI 16.62 kg/m²     Last documented pain score (0-10 scale): Pain Level: 10  Last Weight:   Wt Readings from Last 1 Encounters:   10/15/21 103 lb (46.7 kg)     Mental Status:  {IP PT MENTAL STATUS:}    IV Access:  {[facility] ACCESS:990856110}    Nursing Mobility/ADLs:  Walking   {P DME GZNI:617000399}  Transfer  {P DME KXKA:521040167}  Bathing  {P DME Schedule:  · Phone:  · Fax:    / signature: {Esignature:851906864}    PHYSICIAN SECTION    Prognosis: {Prognosis:1581252640}    Condition at Discharge: Gina Mejias Patient Condition:264618407}    Rehab Potential (if transferring to Rehab): {Prognosis:8108106166}    Recommended Labs or Other Treatments After Discharge: ***    Physician Certification: I certify the above information and transfer of Maris George  is necessary for the continuing treatment of the diagnosis listed and that he requires {Admit to Appropriate Level of Care:48520} for {GREATER/LESS:331904156} 30 days.      Update Admission H&P: {CHP DME Changes in [facility]:528674303}    PHYSICIAN SIGNATURE:  {Esignature:769029669}

## 2021-10-18 LAB
EKG ATRIAL RATE: 63 BPM
EKG P AXIS: 53 DEGREES
EKG P-R INTERVAL: 178 MS
EKG Q-T INTERVAL: 400 MS
EKG QRS DURATION: 90 MS
EKG QTC CALCULATION (BAZETT): 409 MS
EKG R AXIS: 8 DEGREES
EKG T AXIS: 34 DEGREES
EKG VENTRICULAR RATE: 63 BPM

## 2021-10-18 PROCEDURE — 93010 ELECTROCARDIOGRAM REPORT: CPT | Performed by: INTERNAL MEDICINE

## 2022-06-28 ENCOUNTER — HOSPITAL ENCOUNTER (INPATIENT)
Age: 67
LOS: 6 days | Discharge: HOME | DRG: 813 | End: 2022-07-03
Attending: EMERGENCY MEDICINE | Admitting: INTERNAL MEDICINE
Payer: MEDICARE

## 2022-06-28 ENCOUNTER — APPOINTMENT (OUTPATIENT)
Dept: ULTRASOUND IMAGING | Age: 67
DRG: 813 | End: 2022-06-28
Payer: MEDICARE

## 2022-06-28 ENCOUNTER — APPOINTMENT (OUTPATIENT)
Dept: GENERAL RADIOLOGY | Age: 67
DRG: 813 | End: 2022-06-28
Payer: MEDICARE

## 2022-06-28 DIAGNOSIS — R56.9 SEIZURES (HCC): ICD-10-CM

## 2022-06-28 DIAGNOSIS — I47.29 NSVT (NONSUSTAINED VENTRICULAR TACHYCARDIA): ICD-10-CM

## 2022-06-28 DIAGNOSIS — K92.2 GASTROINTESTINAL HEMORRHAGE, UNSPECIFIED GASTROINTESTINAL HEMORRHAGE TYPE: ICD-10-CM

## 2022-06-28 DIAGNOSIS — N17.9 AKI (ACUTE KIDNEY INJURY) (HCC): ICD-10-CM

## 2022-06-28 DIAGNOSIS — D62 ACUTE BLOOD LOSS ANEMIA: Primary | ICD-10-CM

## 2022-06-28 LAB
ABO/RH: NORMAL
ALBUMIN SERPL-MCNC: 3.9 G/DL (ref 3.5–4.6)
ALP BLD-CCNC: 123 U/L (ref 35–104)
ALT SERPL-CCNC: 14 U/L (ref 0–41)
ANION GAP SERPL CALCULATED.3IONS-SCNC: 11 MEQ/L (ref 9–15)
ANION GAP SERPL CALCULATED.3IONS-SCNC: 12 MEQ/L (ref 9–15)
ANTIBODY SCREEN: NORMAL
APTT: 46.2 SEC (ref 24.4–36.8)
AST SERPL-CCNC: 15 U/L (ref 0–40)
BASOPHILS ABSOLUTE: 0 K/UL (ref 0–0.2)
BASOPHILS RELATIVE PERCENT: 0.2 %
BILIRUB SERPL-MCNC: <0.2 MG/DL (ref 0.2–0.7)
BLOOD BANK DISPENSE STATUS: NORMAL
BLOOD BANK PRODUCT CODE: NORMAL
BPU ID: NORMAL
BUN BLDV-MCNC: 74 MG/DL (ref 8–23)
BUN BLDV-MCNC: 88 MG/DL (ref 8–23)
CALCIUM SERPL-MCNC: 7.7 MG/DL (ref 8.5–9.9)
CALCIUM SERPL-MCNC: 9 MG/DL (ref 8.5–9.9)
CHLORIDE BLD-SCNC: 107 MEQ/L (ref 95–107)
CHLORIDE BLD-SCNC: 97 MEQ/L (ref 95–107)
CO2: 19 MEQ/L (ref 20–31)
CO2: 23 MEQ/L (ref 20–31)
CREAT SERPL-MCNC: 1.35 MG/DL (ref 0.7–1.2)
CREAT SERPL-MCNC: 1.69 MG/DL (ref 0.7–1.2)
DESCRIPTION BLOOD BANK: NORMAL
EOSINOPHILS ABSOLUTE: 0.1 K/UL (ref 0–0.7)
EOSINOPHILS RELATIVE PERCENT: 0.9 %
GFR AFRICAN AMERICAN: 49.1
GFR AFRICAN AMERICAN: >60
GFR NON-AFRICAN AMERICAN: 40.5
GFR NON-AFRICAN AMERICAN: 52.5
GLOBULIN: 2.2 G/DL (ref 2.3–3.5)
GLUCOSE BLD-MCNC: 113 MG/DL (ref 70–99)
GLUCOSE BLD-MCNC: 115 MG/DL (ref 70–99)
GLUCOSE BLD-MCNC: 129 MG/DL (ref 70–99)
HCT VFR BLD CALC: 25.7 % (ref 42–52)
HEMOGLOBIN: 8.9 G/DL (ref 14–18)
INR BLD: 6.2
LIPASE: 18 U/L (ref 12–95)
LYMPHOCYTES ABSOLUTE: 1.3 K/UL (ref 1–4.8)
LYMPHOCYTES RELATIVE PERCENT: 12.7 %
MAGNESIUM: 1.6 MG/DL (ref 1.7–2.4)
MAGNESIUM: 1.9 MG/DL (ref 1.7–2.4)
MCH RBC QN AUTO: 31.5 PG (ref 27–31.3)
MCHC RBC AUTO-ENTMCNC: 34.4 % (ref 33–37)
MCV RBC AUTO: 91.7 FL (ref 80–100)
MONOCYTES ABSOLUTE: 0.6 K/UL (ref 0.2–0.8)
MONOCYTES RELATIVE PERCENT: 5.3 %
NEUTROPHILS ABSOLUTE: 8.4 K/UL (ref 1.4–6.5)
NEUTROPHILS RELATIVE PERCENT: 80.9 %
PDW BLD-RTO: 13 % (ref 11.5–14.5)
PERFORMED ON: ABNORMAL
PLATELET # BLD: 279 K/UL (ref 130–400)
POTASSIUM SERPL-SCNC: 4.4 MEQ/L (ref 3.4–4.9)
POTASSIUM SERPL-SCNC: 4.5 MEQ/L (ref 3.4–4.9)
POTASSIUM SERPL-SCNC: 5.5 MEQ/L (ref 3.4–4.9)
PROTHROMBIN TIME: 52.6 SEC (ref 12.3–14.9)
RBC # BLD: 2.81 M/UL (ref 4.7–6.1)
SARS-COV-2, NAAT: NOT DETECTED
SODIUM BLD-SCNC: 131 MEQ/L (ref 135–144)
SODIUM BLD-SCNC: 138 MEQ/L (ref 135–144)
TOTAL PROTEIN: 6.1 G/DL (ref 6.3–8)
WBC # BLD: 10.4 K/UL (ref 4.8–10.8)

## 2022-06-28 PROCEDURE — 85730 THROMBOPLASTIN TIME PARTIAL: CPT

## 2022-06-28 PROCEDURE — 84132 ASSAY OF SERUM POTASSIUM: CPT

## 2022-06-28 PROCEDURE — C9113 INJ PANTOPRAZOLE SODIUM, VIA: HCPCS | Performed by: NURSE PRACTITIONER

## 2022-06-28 PROCEDURE — 86901 BLOOD TYPING SEROLOGIC RH(D): CPT

## 2022-06-28 PROCEDURE — 2580000003 HC RX 258: Performed by: INTERNAL MEDICINE

## 2022-06-28 PROCEDURE — P9016 RBC LEUKOCYTES REDUCED: HCPCS

## 2022-06-28 PROCEDURE — 80053 COMPREHEN METABOLIC PANEL: CPT

## 2022-06-28 PROCEDURE — 36415 COLL VENOUS BLD VENIPUNCTURE: CPT

## 2022-06-28 PROCEDURE — 6360000002 HC RX W HCPCS: Performed by: INTERNAL MEDICINE

## 2022-06-28 PROCEDURE — 36430 TRANSFUSION BLD/BLD COMPNT: CPT

## 2022-06-28 PROCEDURE — 86850 RBC ANTIBODY SCREEN: CPT

## 2022-06-28 PROCEDURE — 85610 PROTHROMBIN TIME: CPT

## 2022-06-28 PROCEDURE — 87635 SARS-COV-2 COVID-19 AMP PRB: CPT

## 2022-06-28 PROCEDURE — 6360000002 HC RX W HCPCS: Performed by: NURSE PRACTITIONER

## 2022-06-28 PROCEDURE — 2580000003 HC RX 258: Performed by: NURSE PRACTITIONER

## 2022-06-28 PROCEDURE — 96374 THER/PROPH/DIAG INJ IV PUSH: CPT

## 2022-06-28 PROCEDURE — 6370000000 HC RX 637 (ALT 250 FOR IP): Performed by: INTERNAL MEDICINE

## 2022-06-28 PROCEDURE — 76770 US EXAM ABDO BACK WALL COMP: CPT

## 2022-06-28 PROCEDURE — 83735 ASSAY OF MAGNESIUM: CPT

## 2022-06-28 PROCEDURE — C9113 INJ PANTOPRAZOLE SODIUM, VIA: HCPCS | Performed by: INTERNAL MEDICINE

## 2022-06-28 PROCEDURE — P9059 PLASMA, FRZ BETWEEN 8-24HOUR: HCPCS

## 2022-06-28 PROCEDURE — 85025 COMPLETE CBC W/AUTO DIFF WBC: CPT

## 2022-06-28 PROCEDURE — 2060000000 HC ICU INTERMEDIATE R&B

## 2022-06-28 PROCEDURE — 71046 X-RAY EXAM CHEST 2 VIEWS: CPT

## 2022-06-28 PROCEDURE — 86923 COMPATIBILITY TEST ELECTRIC: CPT

## 2022-06-28 PROCEDURE — 86900 BLOOD TYPING SEROLOGIC ABO: CPT

## 2022-06-28 PROCEDURE — 99285 EMERGENCY DEPT VISIT HI MDM: CPT

## 2022-06-28 PROCEDURE — 2500000003 HC RX 250 WO HCPCS: Performed by: INTERNAL MEDICINE

## 2022-06-28 PROCEDURE — 83690 ASSAY OF LIPASE: CPT

## 2022-06-28 RX ORDER — SODIUM CHLORIDE 9 MG/ML
INJECTION, SOLUTION INTRAVENOUS PRN
Status: DISCONTINUED | OUTPATIENT
Start: 2022-06-28 | End: 2022-07-03

## 2022-06-28 RX ORDER — METOPROLOL TARTRATE 5 MG/5ML
5 INJECTION INTRAVENOUS ONCE
Status: COMPLETED | OUTPATIENT
Start: 2022-06-28 | End: 2022-06-28

## 2022-06-28 RX ORDER — ONDANSETRON 2 MG/ML
4 INJECTION INTRAMUSCULAR; INTRAVENOUS EVERY 6 HOURS PRN
Status: DISCONTINUED | OUTPATIENT
Start: 2022-06-28 | End: 2022-06-28

## 2022-06-28 RX ORDER — 0.9 % SODIUM CHLORIDE 0.9 %
500 INTRAVENOUS SOLUTION INTRAVENOUS ONCE
Status: COMPLETED | OUTPATIENT
Start: 2022-06-28 | End: 2022-06-28

## 2022-06-28 RX ORDER — 0.9 % SODIUM CHLORIDE 0.9 %
1000 INTRAVENOUS SOLUTION INTRAVENOUS ONCE
Status: COMPLETED | OUTPATIENT
Start: 2022-06-28 | End: 2022-06-29

## 2022-06-28 RX ORDER — SODIUM CHLORIDE 9 MG/ML
INJECTION, SOLUTION INTRAVENOUS PRN
Status: DISCONTINUED | OUTPATIENT
Start: 2022-06-28 | End: 2022-07-03 | Stop reason: HOSPADM

## 2022-06-28 RX ORDER — MAGNESIUM SULFATE IN WATER 40 MG/ML
2000 INJECTION, SOLUTION INTRAVENOUS ONCE
Status: COMPLETED | OUTPATIENT
Start: 2022-06-29 | End: 2022-06-29

## 2022-06-28 RX ORDER — ATORVASTATIN CALCIUM 80 MG/1
80 TABLET, FILM COATED ORAL NIGHTLY
COMMUNITY
Start: 2020-11-16

## 2022-06-28 RX ORDER — ONDANSETRON 2 MG/ML
4 INJECTION INTRAMUSCULAR; INTRAVENOUS EVERY 6 HOURS PRN
Status: DISCONTINUED | OUTPATIENT
Start: 2022-06-28 | End: 2022-07-03 | Stop reason: HOSPADM

## 2022-06-28 RX ORDER — DILTIAZEM HYDROCHLORIDE 5 MG/ML
10 INJECTION INTRAVENOUS ONCE
Status: COMPLETED | OUTPATIENT
Start: 2022-06-28 | End: 2022-06-28

## 2022-06-28 RX ORDER — ONDANSETRON 2 MG/ML
4 INJECTION INTRAMUSCULAR; INTRAVENOUS ONCE
Status: COMPLETED | OUTPATIENT
Start: 2022-06-28 | End: 2022-06-28

## 2022-06-28 RX ORDER — FERROUS SULFATE 325(65) MG
325 TABLET ORAL DAILY
COMMUNITY
Start: 2021-08-19

## 2022-06-28 RX ORDER — PANTOPRAZOLE SODIUM 40 MG/10ML
40 INJECTION, POWDER, LYOPHILIZED, FOR SOLUTION INTRAVENOUS 2 TIMES DAILY
Status: DISCONTINUED | OUTPATIENT
Start: 2022-06-28 | End: 2022-06-29 | Stop reason: SDUPTHER

## 2022-06-28 RX ORDER — PHENOBARBITAL 32.4 MG/1
129.6 TABLET ORAL 2 TIMES DAILY
Status: DISCONTINUED | OUTPATIENT
Start: 2022-06-28 | End: 2022-07-03 | Stop reason: HOSPADM

## 2022-06-28 RX ORDER — 0.9 % SODIUM CHLORIDE 0.9 %
1000 INTRAVENOUS SOLUTION INTRAVENOUS ONCE
Status: COMPLETED | OUTPATIENT
Start: 2022-06-28 | End: 2022-06-28

## 2022-06-28 RX ORDER — LISINOPRIL 10 MG/1
10 TABLET ORAL DAILY
Status: ON HOLD | COMMUNITY
Start: 2021-08-20 | End: 2022-06-28 | Stop reason: SDUPTHER

## 2022-06-28 RX ORDER — ASPIRIN 81 MG/1
81 TABLET ORAL DAILY
COMMUNITY
Start: 2021-08-11

## 2022-06-28 RX ADMIN — METOPROLOL TARTRATE 5 MG: 5 INJECTION, SOLUTION INTRAVENOUS at 21:52

## 2022-06-28 RX ADMIN — SODIUM CHLORIDE 500 ML: 9 INJECTION, SOLUTION INTRAVENOUS at 13:43

## 2022-06-28 RX ADMIN — SODIUM CHLORIDE 500 ML: 9 INJECTION, SOLUTION INTRAVENOUS at 15:47

## 2022-06-28 RX ADMIN — SODIUM CHLORIDE 80 MG: 9 INJECTION, SOLUTION INTRAVENOUS at 15:13

## 2022-06-28 RX ADMIN — SODIUM CHLORIDE 1000 ML: 9 INJECTION, SOLUTION INTRAVENOUS at 21:52

## 2022-06-28 RX ADMIN — SODIUM CHLORIDE 1000 ML: 9 INJECTION, SOLUTION INTRAVENOUS at 23:04

## 2022-06-28 RX ADMIN — ONDANSETRON 4 MG: 2 INJECTION INTRAMUSCULAR; INTRAVENOUS at 21:50

## 2022-06-28 RX ADMIN — ONDANSETRON 4 MG: 2 INJECTION INTRAMUSCULAR; INTRAVENOUS at 13:39

## 2022-06-28 RX ADMIN — PANTOPRAZOLE SODIUM 40 MG: 40 INJECTION, POWDER, FOR SOLUTION INTRAVENOUS at 18:52

## 2022-06-28 RX ADMIN — PHENOBARBITAL 129.6 MG: 32.4 TABLET ORAL at 18:52

## 2022-06-28 RX ADMIN — SODIUM ZIRCONIUM CYCLOSILICATE 5 G: 5 POWDER, FOR SUSPENSION ORAL at 21:29

## 2022-06-28 RX ADMIN — SODIUM CHLORIDE 1000 ML: 9 INJECTION, SOLUTION INTRAVENOUS at 18:39

## 2022-06-28 RX ADMIN — PHYTONADIONE 10 MG: 10 INJECTION, EMULSION INTRAMUSCULAR; INTRAVENOUS; SUBCUTANEOUS at 18:49

## 2022-06-28 RX ADMIN — DILTIAZEM HYDROCHLORIDE 10 MG: 5 INJECTION INTRAVENOUS at 23:09

## 2022-06-28 ASSESSMENT — PAIN SCALES - GENERAL
PAINLEVEL_OUTOF10: 6
PAINLEVEL_OUTOF10: 0

## 2022-06-28 ASSESSMENT — ENCOUNTER SYMPTOMS
VOMITING: 0
TROUBLE SWALLOWING: 0
BACK PAIN: 0
SORE THROAT: 0
DIARRHEA: 1
ABDOMINAL PAIN: 0
COUGH: 0
SINUS PAIN: 0
ABDOMINAL DISTENTION: 0
NAUSEA: 1
SHORTNESS OF BREATH: 0

## 2022-06-28 ASSESSMENT — PAIN DESCRIPTION - LOCATION: LOCATION: LEG

## 2022-06-28 ASSESSMENT — PAIN - FUNCTIONAL ASSESSMENT
PAIN_FUNCTIONAL_ASSESSMENT: 0-10
PAIN_FUNCTIONAL_ASSESSMENT: ACTIVITIES ARE NOT PREVENTED

## 2022-06-28 ASSESSMENT — PAIN DESCRIPTION - PAIN TYPE: TYPE: ACUTE PAIN

## 2022-06-28 ASSESSMENT — PAIN DESCRIPTION - ORIENTATION: ORIENTATION: LEFT;RIGHT

## 2022-06-28 ASSESSMENT — PAIN DESCRIPTION - DESCRIPTORS: DESCRIPTORS: ACHING

## 2022-06-28 ASSESSMENT — PAIN DESCRIPTION - FREQUENCY: FREQUENCY: INTERMITTENT

## 2022-06-28 NOTE — EC ADMISSION CRITERIA
AdmissionCare    Guideline: Renal Failure, Acute, Inpatient    Based on the indications selected for the patient, the bed status of Admit to Inpatient was determined to be MET    The following indications were selected as present at the time of evaluation of the patient:   - Admission is indicated for:    - Acute as indicated by:     - 3-fold rise in serum creatinine from baseline    AdmissionCare documentation entered by: Erik Garcia, 26th edition, Copyright © 2022 [address] [place] [address].  [id number]:08:40-04:00

## 2022-06-28 NOTE — CARE COORDINATION
[facility] Case Management Initial Discharge Assessment    Met with Patient and GIRLFRIEND Sara Kirkpatrick [phone] to discuss discharge plan. PCP: Shannon Carballo DO                                Date of Last Visit: 2 WEEKS AGO  CARDIOLOGIST (CC)  DR Omer Tucker MANAGES PT'S COUMADIN & PT GETS TESTING IN HIS OFFICE    VA Patient: No        [facility] Notified: no    If no PCP, list provided? N/A    Discharge Planning    Living Arrangements: independently at home    Who do you live with? GIRLFRIENDELILAH Kirkpatrick    Who helps you with your care:  self    If lives at home:     Do you have any barriers navigating in your home? yes - BED/BATH UPSTAIRS    Patient can perform ADL? Yes    Current Services (outpatient and in home) : COUMADIN MONITORING SEE ABOVE    Dialysis: No    Is transportation available to get to your appointments? 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  No      Patient agreeable to [place] [address]? Yes, [facility] IF INDICATED    Patient agreeable to SNF/Rehab? N/A    Other discharge needs identified? Other CM TO REASSESS ANY NEW NEEDS DAILY    Does Patient Have a High-Risk for Readmission Diagnosis (CHF, PN, MI, COPD)? Yes CAD     If Yes,     Consult with pulmonologist? No   Consult with cardiologist? No   Cardiac Rehab referral if EF <35%? N/A   Consult with Pharmacy for medication assessment prior to discharge? N/A   Consult with Behavioral health to aid in depression, anxiety, or coping issues? No   Palliative Care Consult? N/A   Pulmonary Rehab order for COPD, PN, and CHF (if EF > 35%)? N/A    Does patient have a reliable scale and know how to read it (for CHF)? No   Nutrition consult for CHF?  N/A   Respiratory therapy consult that includes bedside instruction on administration of nebulizers and/or inhalers, and assessment of oxygen and equipment needs in the home? PT CURRENTLY ON RA*    Initial Discharge Plan? (Note: please see concurrent daily documentation for any updates after initial note).     RETURN HOME W GIRLFRIEND [address] IF INDICATED    Readmission Risk              Risk of Unplanned Readmission:  0         Electronically signed by Nataly Ragland RN on 6/28/2022 at 3:50 PM

## 2022-06-28 NOTE — CARE COORDINATION
06/28/22    From:HOME W GIRLFRIEND AMB INDEPENDENTLY DR Lydia Epley MANAGES COUMADIN & GETS FINGERSTICK TEST IN HIS OFFICE    Admit:KRYS, GI BLEEDING     PMH:CAD, PAF (COUMADIN), EPILEPSY, KRYS, ANEMIA, HTN, HLP    Anticipated Discharge Disposition:RETURN HOME [facility] IF INDICATED    Patient Mobility or PT/OT ordered:N/A INDEPENDENT    Consults: GI, CARDIOLOGY    Clinical:     Barriers to Discharge:  GI PANEL  NPO--ICE CHIPS  CONSULTS NEED TO SEE  ANTICIPATE SCOPE  COUMADIN ON HOLD  NEEDS TO COLLECT STOOL FOR C-DIFF  RETROPERITONEAL US NEEDS DONE    Assessments: CMI DONE

## 2022-06-28 NOTE — PROGRESS NOTES
GI CONSULT CALLED TO DR Bains Parents VIA PERFECT SERVE Electronically signed by Dion Arroyo on 6/28/2022 at 6:03 PM  [facility] Electronically signed by Dion Arroyo on 6/28/2022 at 7:29 PM

## 2022-06-28 NOTE — ED PROVIDER NOTES
[facility]  eMERGENCY dEPARTMENT eNCOUnter      Pt Name: Bhanu Ortega  MRN: [id number]  [place] 1955  Date of evaluation: 6/28/2022  Provider: KATHY Rogers CNP      HISTORY OF PRESENT ILLNESS    Bhanu Ortega is a 75 y.o. male who presents to the Emergency Department with diarrhea and nausea since last night. He denies CP, leg swelling, SOB, fever or abdominal pain. Patient states he last ate some turkey last night. He states he has been having some dark tarry stools for over 1 week. Patient does take Coumadin. REVIEW OF SYSTEMS       Review of Systems   Constitutional: Negative for activity change, appetite change and fever. HENT: Negative for congestion, drooling, ear pain, sinus pain, sore throat and trouble swallowing. Respiratory: Negative for cough and shortness of breath. Cardiovascular: Negative for chest pain. Gastrointestinal: Positive for diarrhea and nausea. Negative for abdominal distention, abdominal pain and vomiting. Genitourinary: Negative for dysuria. Musculoskeletal: Negative for arthralgias, back pain and neck pain. Skin: Negative for rash. Neurological: Negative for dizziness, syncope, light-headedness and headaches. All other systems reviewed and are negative.         PAST MEDICAL HISTORY     Past Medical History:   Diagnosis Date    KRYS (acute kidney injury) ([address] 6/28/2022    Anemia     Back pain     chronic    CAD (coronary artery disease)     Closed fracture of neck of left femur (HCC)     Epilepsy (HCC)     Hyperlipidemia     Hypertension     Paroxysmal A-fib ([address]     Shingles          SURGICAL HISTORY       Past Surgical History:   Procedure Laterality Date    CAROTID ENDARTERECTOMY      CHOLECYSTECTOMY, LAPAROSCOPIC N/A 04/13/2021    LAPAROSCOPIC CHOLECYSTECTOMY  WITH CHOLANGIOGRAMS performed by Kathleen Hester MD at [facility]       Current Discharge Medication List      CONTINUE these medications which have NOT CHANGED    Details   aspirin 81 MG EC tablet Take 81 mg by mouth daily      atorvastatin (LIPITOR) 80 MG tablet Take 80 mg by mouth at bedtime      ferrous sulfate (IRON 325) 325 (65 Fe) MG tablet Take 325 mg by mouth daily      Cholecalciferol 50 MCG (2000 UT) TABS Take 2,000 Units by mouth daily      dicyclomine (BENTYL) 10 MG capsule Take 1 capsule by mouth every 6 hours as needed (cramps)  Qty: 20 capsule, Refills: 0      clopidogrel (PLAVIX) 75 MG tablet Take 75 mg by mouth daily      lisinopril (PRINIVIL;ZESTRIL) 10 MG tablet Take by mouth daily      PHENobarbital (LUMINAL) 32.4 MG tablet Take 129.6 mg by mouth 2 times daily      ibuprofen (ADVIL;MOTRIN) 800 MG tablet Take 1 tablet by mouth every 8 hours as needed for Pain or Fever  Qty: 20 tablet, Refills: 0             ALLERGIES     Penicillins    FAMILY HISTORY     History reviewed. No pertinent family history. SOCIAL HISTORY       Social History     Socioeconomic History    Marital status:      Spouse name: None    Number of children: None    Years of education: None    Highest education level: None   Occupational History    None   Tobacco Use    Smoking status: Current Every Day Smoker     Packs/day: 1.00     Years: 10.00     Pack years: 10.00     Types: Cigarettes    Smokeless tobacco: Never Used   Vaping Use    Vaping Use: Never used   Substance and Sexual Activity    Alcohol use: Not Currently    Drug use: Never    Sexual activity: None   Other Topics Concern    None   Social History Narrative    None     Social Determinants of Health     Financial Resource Strain:     Difficulty of Paying Living Expenses: Not on file   Food Insecurity:     Worried About Running Out of Food in the Last Year: Not on file    Rachael of Food in the Last Year: Not on file   Transportation Needs:     Lack of Transportation (Medical): Not on file    Lack of Transportation (Non-Medical):  Not on file   Physical Activity:     Days of Exercise per Week: Not on file    Minutes of Exercise per Session: Not on file   Stress:     Feeling of Stress : Not on file   Social Connections:     Frequency of Communication with Friends and Family: Not on file    Frequency of Social Gatherings with Friends and Family: Not on file    Attends Rastafarian Services: Not on file    Active Member of [address] [place] [address] or Organizations: Not on file    Attends Club or Organization Meetings: Not on file    Marital Status: Not on file   Intimate Partner Violence:     Fear of Current or Ex-Partner: Not on file    Emotionally Abused: Not on file    Physically Abused: Not on file    Sexually Abused: Not on file   Housing Stability:     Unable to Pay for Housing in the Last Year: Not on file    Number of [place] in the Last Year: Not on file    Unstable Housing in the Last Year: Not on file       SCREENINGS    [place] Coma Scale  Eye Opening: Spontaneous  Best Verbal Response: Oriented  Best Motor Response: Obeys commands  [place] Coma Scale Score: 15 @FLOW([id number])@      PHYSICAL EXAM    (up to 7 for level 4, 8 or more for level 5)     ED Triage Vitals [06/28/22 1254]   BP Temp Temp Source Heart Rate Resp SpO2 Height Weight   129/64 98.1 °F (36.7 °C) Oral 93 16 100 % 5' 5\" (1.651 m) 130 lb (59 kg)       Physical Exam  Vitals and nursing note reviewed. Constitutional:       Appearance: He is well-developed. HENT:      Head: Normocephalic and atraumatic. Right Ear: Hearing and external ear normal.      Left Ear: Hearing and external ear normal.      Nose: Nose normal.      Mouth/Throat:      Lips: Pink. Mouth: Mucous membranes are moist.      Pharynx: Oropharynx is clear. Uvula midline. Eyes:      Conjunctiva/sclera: Conjunctivae normal.      Pupils: Pupils are equal, round, and reactive to light. Cardiovascular:      Rate and Rhythm: Normal rate and regular rhythm. Heart sounds: Normal heart sounds. Pulmonary:      Effort: Pulmonary effort is normal. No accessory muscle usage or respiratory distress. Breath sounds: Normal breath sounds. No decreased air movement. No decreased breath sounds, wheezing or rhonchi. Abdominal:      General: Bowel sounds are normal. There is no distension. Palpations: Abdomen is soft. Tenderness: There is no abdominal tenderness. Genitourinary:     Rectum: Guaiac result positive. Musculoskeletal:         General: Normal range of motion. Cervical back: Normal range of motion and neck supple. Skin:     General: Skin is warm and dry. Neurological:      General: No focal deficit present. Mental Status: He is alert and oriented to person, place, and time. GCS: GCS eye subscore is 4. GCS verbal subscore is 5. GCS motor subscore is 6. Deep Tendon Reflexes: Reflexes are normal and symmetric. Psychiatric:         Judgment: Judgment normal.           All other labs were within normal range or not returned as of this dictation. EMERGENCY DEPARTMENT COURSE and DIFFERENTIALDIAGNOSIS/MDM:   Vitals:    Vitals:    06/28/22 1901 06/28/22 1916 06/28/22 1932 06/28/22 1933   BP: (!) 108/39 (!) 102/48 (!) 88/51 (!) 105/41   Pulse: 70 73 69 71   Resp:  18 18 18   Temp:  97.3 °F (36.3 °C) 97.9 °F (36.6 °C)    TempSrc:  Oral Oral    SpO2: 100% 100% 100% 100%   Weight:       Height:                75 yr old male with Acute Kidney Injury and GI bleed. Patient will be admitted to the hospital.  He was given 80 mg IV Protonix and 1 L IVF in the ED. He is stable at this time and verbalizes understanding of the plan. PROCEDURES:  Unless otherwise noted below, none     Procedures      FINAL IMPRESSION      1. KRYS (acute kidney injury) ([facility]    2.  Gastrointestinal hemorrhage, unspecified gastrointestinal hemorrhage type          DISPOSITION/PLAN   DISPOSITION Admitted 06/28/2022 03:12:02 PM          KATHY Foreman CNP (electronically signed)  Attending Emergency Physician     Kike Metcalf, KATHY - JIM  06/28/22 2034

## 2022-06-28 NOTE — H&P
Patient is 58-year-old male with past medical history of coronary disease, A. fib on Coumadin comes in for black tarry stools and diarrhea for the past 1 day. Denies any fever chills. Has been very weak. No nausea vomiting. Denies any abdominal pain. No recent travel or sick contacts. Patient sees [facility] cardiology as outpatient. Patient was switched to Coumadin from Plavix for A. fib as per the patient. In the ER patient noted to have acute posthemorrhagic anemia and acute kidney injury. Past Medical History:   Diagnosis Date    KRYS (acute kidney injury) ([facility] [address] 6/28/2022    Anemia     Back pain     chronic    CAD (coronary artery disease)     Closed fracture of neck of left femur (HCC)     Epilepsy (HCC)     Hyperlipidemia     Hypertension     Paroxysmal A-fib ([facility] [address]     Shingles      Past Surgical History:   Procedure Laterality Date    CAROTID ENDARTERECTOMY      CHOLECYSTECTOMY, LAPAROSCOPIC N/A 04/13/2021    LAPAROSCOPIC CHOLECYSTECTOMY  WITH CHOLANGIOGRAMS performed by Elier Ward MD at [place] [id number] History     Tobacco History     Smoking Status  Current Every Day Smoker Smoking Frequency  1 pack/day for 10 years (10 pk yrs) Smoking Tobacco Type  Cigarettes    Smokeless Tobacco Use  Never Used          Alcohol History     Alcohol Use Status  Not Currently          Drug Use     Drug Use Status  Never          Sexual Activity     Sexually Active  Not Asked              History reviewed. No pertinent family history. No current facility-administered medications on file prior to encounter.      Current Outpatient Medications on File Prior to Encounter   Medication Sig Dispense Refill    dicyclomine (BENTYL) 10 MG capsule Take 1 capsule by mouth every 6 hours as needed (cramps) 20 capsule 0    clopidogrel (PLAVIX) 75 MG tablet Take 75 mg by mouth daily      lisinopril (PRINIVIL;ZESTRIL) 10 MG tablet Take by mouth daily      PHENobarbital (LUMINAL) 32.4 MG tablet Take 129.6 mg by mouth 2 times daily      ibuprofen (ADVIL;MOTRIN) 800 MG tablet Take 1 tablet by mouth every 8 hours as needed for Pain or Fever 20 tablet 0     Lab Results   Component Value Date    WBC 10.4 06/28/2022    HGB 8.9 (L) 06/28/2022    HCT 25.7 (L) 06/28/2022    MCV 91.7 06/28/2022     06/28/2022     Lab Results   Component Value Date     06/28/2022    K 5.5 06/28/2022    CL 97 06/28/2022    CO2 23 06/28/2022    BUN 88 06/28/2022    CREATININE 1.69 06/28/2022    GLUCOSE 115 06/28/2022    CALCIUM 9.0 06/28/2022    ROS: 12 point review of system is negative except as in HPI  HEENT: AT/NC, PERRLA, no JVD  HEART: s1/s2 wnl w/o s3  LUNG: clear  ABD: soft, NT  EXT: no edema  SKin : no rash  Neuro:no focal deficits  1) KRYS  2) acute post hemorrhagic anemia  3 afib  4) hyponatremia and hyperkalemia  5) ho seizures  6) diarhea  IV fluids, stool studies, hold anticoagulation for now GI evaluation, cardiology evaluation for anticoagulation post discharge. Tele  NPO except meds, pt is ok with blood transfusion if needed understand the risk and benefits, resume phenobarbital and daily inr  Informed Consent for Blood Component Transfusion Note     I have discussed with the patient the rationale for blood component transfusion; its benefits in treating or preventing fatigue, organ damage, or death; and its risk which includes mild transfusion reactions, rare risk of blood borne infection, or more serious but rare reactions. I have discussed the alternatives to transfusion, including the risk and consequences of not receiving transfusion. The patient had an opportunity to ask questions and had agreed to proceed with transfusion of blood components.

## 2022-06-28 NOTE — PROGRESS NOTES
1722: [employer] sent to Dr. Anton Gold regarding low pressures and INR critical result of 6.2. Orders put in.     1930: Pt down for retroperitoneal US at this time. 1830: Med Rec complete with assistance from Man, pt's girlfriend who was at bedside before visiting hours were over. Dr. Anton Gold notified via [employer]. Handoff report given to JACY Adair.      Electronically signed by Ally Candelaria RN on 6/28/22 at 8:31 PM EDT

## 2022-06-29 ENCOUNTER — ANESTHESIA (OUTPATIENT)
Dept: OPERATING ROOM | Age: 67
DRG: 813 | End: 2022-06-29
Payer: MEDICARE

## 2022-06-29 ENCOUNTER — ANESTHESIA EVENT (OUTPATIENT)
Dept: OPERATING ROOM | Age: 67
DRG: 813 | End: 2022-06-29
Payer: MEDICARE

## 2022-06-29 LAB
ALBUMIN SERPL-MCNC: 3.1 G/DL (ref 3.5–4.6)
ALP BLD-CCNC: 87 U/L (ref 35–104)
ALT SERPL-CCNC: 16 U/L (ref 0–41)
ANION GAP SERPL CALCULATED.3IONS-SCNC: 9 MEQ/L (ref 9–15)
ANISOCYTOSIS: ABNORMAL
AST SERPL-CCNC: 45 U/L (ref 0–40)
BASOPHILS ABSOLUTE: 0 K/UL (ref 0–0.2)
BASOPHILS ABSOLUTE: 0 K/UL (ref 0–0.2)
BASOPHILS RELATIVE PERCENT: 0.3 %
BASOPHILS RELATIVE PERCENT: 0.4 %
BILIRUB SERPL-MCNC: 0.3 MG/DL (ref 0.2–0.7)
BUN BLDV-MCNC: 64 MG/DL (ref 8–23)
CALCIUM SERPL-MCNC: 7.5 MG/DL (ref 8.5–9.9)
CHLORIDE BLD-SCNC: 109 MEQ/L (ref 95–107)
CO2: 20 MEQ/L (ref 20–31)
CREAT SERPL-MCNC: 1.23 MG/DL (ref 0.7–1.2)
EOSINOPHILS ABSOLUTE: 0 K/UL (ref 0–0.7)
EOSINOPHILS ABSOLUTE: 0.1 K/UL (ref 0–0.7)
EOSINOPHILS RELATIVE PERCENT: 0.7 %
EOSINOPHILS RELATIVE PERCENT: 2.6 %
GFR AFRICAN AMERICAN: >60
GFR NON-AFRICAN AMERICAN: 58.5
GLOBULIN: 1.5 G/DL (ref 2.3–3.5)
GLUCOSE BLD-MCNC: 95 MG/DL (ref 70–99)
HCT VFR BLD CALC: 19.4 % (ref 42–52)
HCT VFR BLD CALC: 20.6 % (ref 42–52)
HCT VFR BLD CALC: 20.7 % (ref 42–52)
HCT VFR BLD CALC: 21.2 % (ref 42–52)
HEMOGLOBIN: 6.4 G/DL (ref 14–18)
HEMOGLOBIN: 7 G/DL (ref 14–18)
INR BLD: 1.5
LV EF: 68 %
LVEF MODALITY: NORMAL
LYMPHOCYTES ABSOLUTE: 1.7 K/UL (ref 1–4.8)
LYMPHOCYTES ABSOLUTE: 2.1 K/UL (ref 1–4.8)
LYMPHOCYTES RELATIVE PERCENT: 16.3 %
LYMPHOCYTES RELATIVE PERCENT: 20 %
MAGNESIUM: 2.1 MG/DL (ref 1.7–2.4)
MCH RBC QN AUTO: 31 PG (ref 27–31.3)
MCH RBC QN AUTO: 31.3 PG (ref 27–31.3)
MCHC RBC AUTO-ENTMCNC: 33.1 % (ref 33–37)
MCHC RBC AUTO-ENTMCNC: 33.9 % (ref 33–37)
MCV RBC AUTO: 92.2 FL (ref 80–100)
MCV RBC AUTO: 93.6 FL (ref 80–100)
MONOCYTES ABSOLUTE: 0.4 K/UL (ref 0.2–0.8)
MONOCYTES ABSOLUTE: 0.6 K/UL (ref 0.2–0.8)
MONOCYTES RELATIVE PERCENT: 3.8 %
MONOCYTES RELATIVE PERCENT: 5.8 %
NEUTROPHILS ABSOLUTE: 7.9 K/UL (ref 1.4–6.5)
NEUTROPHILS ABSOLUTE: 8.1 K/UL (ref 1.4–6.5)
NEUTROPHILS RELATIVE PERCENT: 76 %
NEUTROPHILS RELATIVE PERCENT: 76.9 %
PDW BLD-RTO: 13.2 % (ref 11.5–14.5)
PDW BLD-RTO: 13.5 % (ref 11.5–14.5)
PLATELET # BLD: 181 K/UL (ref 130–400)
PLATELET # BLD: 217 K/UL (ref 130–400)
PLATELET SLIDE REVIEW: NORMAL
POIKILOCYTES: ABNORMAL
POTASSIUM SERPL-SCNC: 4.8 MEQ/L (ref 3.4–4.9)
PROTHROMBIN TIME: 17.9 SEC (ref 12.3–14.9)
RBC # BLD: 2.07 M/UL (ref 4.7–6.1)
RBC # BLD: 2.25 M/UL (ref 4.7–6.1)
SODIUM BLD-SCNC: 138 MEQ/L (ref 135–144)
TOTAL CK: 1323 U/L (ref 0–190)
TOTAL PROTEIN: 4.6 G/DL (ref 6.3–8)
TROPONIN: 1.94 NG/ML (ref 0–0.01)
WBC # BLD: 10.3 K/UL (ref 4.8–10.8)
WBC # BLD: 10.7 K/UL (ref 4.8–10.8)

## 2022-06-29 PROCEDURE — 84484 ASSAY OF TROPONIN QUANT: CPT

## 2022-06-29 PROCEDURE — 3700000000 HC ANESTHESIA ATTENDED CARE: Performed by: INTERNAL MEDICINE

## 2022-06-29 PROCEDURE — 6370000000 HC RX 637 (ALT 250 FOR IP): Performed by: INTERNAL MEDICINE

## 2022-06-29 PROCEDURE — 36430 TRANSFUSION BLD/BLD COMPNT: CPT

## 2022-06-29 PROCEDURE — 43235 EGD DIAGNOSTIC BRUSH WASH: CPT | Performed by: INTERNAL MEDICINE

## 2022-06-29 PROCEDURE — 85025 COMPLETE CBC W/AUTO DIFF WBC: CPT

## 2022-06-29 PROCEDURE — 93306 TTE W/DOPPLER COMPLETE: CPT

## 2022-06-29 PROCEDURE — 2580000003 HC RX 258: Performed by: INTERNAL MEDICINE

## 2022-06-29 PROCEDURE — 99223 1ST HOSP IP/OBS HIGH 75: CPT | Performed by: INTERNAL MEDICINE

## 2022-06-29 PROCEDURE — 51798 US URINE CAPACITY MEASURE: CPT

## 2022-06-29 PROCEDURE — 2000000000 HC ICU R&B

## 2022-06-29 PROCEDURE — 36415 COLL VENOUS BLD VENIPUNCTURE: CPT

## 2022-06-29 PROCEDURE — 85014 HEMATOCRIT: CPT

## 2022-06-29 PROCEDURE — 6360000002 HC RX W HCPCS: Performed by: INTERNAL MEDICINE

## 2022-06-29 PROCEDURE — 85018 HEMOGLOBIN: CPT

## 2022-06-29 PROCEDURE — 6360000002 HC RX W HCPCS: Performed by: REGISTERED NURSE

## 2022-06-29 PROCEDURE — 2709999900 HC NON-CHARGEABLE SUPPLY: Performed by: INTERNAL MEDICINE

## 2022-06-29 PROCEDURE — 83735 ASSAY OF MAGNESIUM: CPT

## 2022-06-29 PROCEDURE — 85610 PROTHROMBIN TIME: CPT

## 2022-06-29 PROCEDURE — 82550 ASSAY OF CK (CPK): CPT

## 2022-06-29 PROCEDURE — C9113 INJ PANTOPRAZOLE SODIUM, VIA: HCPCS | Performed by: INTERNAL MEDICINE

## 2022-06-29 PROCEDURE — 2500000003 HC RX 250 WO HCPCS: Performed by: REGISTERED NURSE

## 2022-06-29 PROCEDURE — 3700000001 HC ADD 15 MINUTES (ANESTHESIA): Performed by: INTERNAL MEDICINE

## 2022-06-29 PROCEDURE — 80053 COMPREHEN METABOLIC PANEL: CPT

## 2022-06-29 PROCEDURE — 3609017100 HC EGD: Performed by: INTERNAL MEDICINE

## 2022-06-29 PROCEDURE — 51701 INSERT BLADDER CATHETER: CPT

## 2022-06-29 PROCEDURE — 0DJ08ZZ INSPECTION OF UPPER INTESTINAL TRACT, VIA NATURAL OR ARTIFICIAL OPENING ENDOSCOPIC: ICD-10-PCS | Performed by: INTERNAL MEDICINE

## 2022-06-29 RX ORDER — SODIUM CHLORIDE 9 MG/ML
INJECTION, SOLUTION INTRAVENOUS ONCE
Status: COMPLETED | OUTPATIENT
Start: 2022-06-29 | End: 2022-06-29

## 2022-06-29 RX ORDER — LANOLIN ALCOHOL/MO/W.PET/CERES
400 CREAM (GRAM) TOPICAL DAILY
Status: DISCONTINUED | OUTPATIENT
Start: 2022-06-29 | End: 2022-06-30

## 2022-06-29 RX ORDER — AMIODARONE HYDROCHLORIDE 200 MG/1
200 TABLET ORAL 2 TIMES DAILY
Status: DISCONTINUED | OUTPATIENT
Start: 2022-06-29 | End: 2022-07-03 | Stop reason: HOSPADM

## 2022-06-29 RX ORDER — GLYCOPYRROLATE 1 MG/5 ML
SYRINGE (ML) INTRAVENOUS PRN
Status: DISCONTINUED | OUTPATIENT
Start: 2022-06-29 | End: 2022-06-29 | Stop reason: SDUPTHER

## 2022-06-29 RX ORDER — LIDOCAINE HYDROCHLORIDE 20 MG/ML
INJECTION, SOLUTION EPIDURAL; INFILTRATION; INTRACAUDAL; PERINEURAL PRN
Status: DISCONTINUED | OUTPATIENT
Start: 2022-06-29 | End: 2022-06-29 | Stop reason: SDUPTHER

## 2022-06-29 RX ORDER — PANTOPRAZOLE SODIUM 40 MG/1
40 TABLET, DELAYED RELEASE ORAL
Status: DISCONTINUED | OUTPATIENT
Start: 2022-06-29 | End: 2022-07-03 | Stop reason: HOSPADM

## 2022-06-29 RX ORDER — SIMETHICONE 20 MG/.3ML
EMULSION ORAL PRN
Status: DISCONTINUED | OUTPATIENT
Start: 2022-06-29 | End: 2022-06-29 | Stop reason: ALTCHOICE

## 2022-06-29 RX ORDER — SODIUM CHLORIDE, SODIUM LACTATE, POTASSIUM CHLORIDE, CALCIUM CHLORIDE 600; 310; 30; 20 MG/100ML; MG/100ML; MG/100ML; MG/100ML
INJECTION, SOLUTION INTRAVENOUS CONTINUOUS
Status: DISPENSED | OUTPATIENT
Start: 2022-06-29 | End: 2022-06-29

## 2022-06-29 RX ORDER — PROPOFOL 10 MG/ML
INJECTION, EMULSION INTRAVENOUS PRN
Status: DISCONTINUED | OUTPATIENT
Start: 2022-06-29 | End: 2022-06-29 | Stop reason: SDUPTHER

## 2022-06-29 RX ORDER — MAGNESIUM HYDROXIDE 1200 MG/15ML
LIQUID ORAL PRN
Status: DISCONTINUED | OUTPATIENT
Start: 2022-06-29 | End: 2022-06-29 | Stop reason: ALTCHOICE

## 2022-06-29 RX ADMIN — Medication 0.4 MG: at 14:52

## 2022-06-29 RX ADMIN — MAGNESIUM SULFATE HEPTAHYDRATE 2000 MG: 40 INJECTION, SOLUTION INTRAVENOUS at 01:00

## 2022-06-29 RX ADMIN — DEXTROSE MONOHYDRATE 150 MG: 50 INJECTION, SOLUTION INTRAVENOUS at 14:02

## 2022-06-29 RX ADMIN — LIDOCAINE HYDROCHLORIDE 60 MG: 20 INJECTION, SOLUTION EPIDURAL; INFILTRATION; INTRACAUDAL; PERINEURAL at 14:52

## 2022-06-29 RX ADMIN — AMIODARONE HYDROCHLORIDE 200 MG: 200 TABLET ORAL at 14:03

## 2022-06-29 RX ADMIN — PHENOBARBITAL 129.6 MG: 32.4 TABLET ORAL at 08:16

## 2022-06-29 RX ADMIN — AMIODARONE HYDROCHLORIDE 200 MG: 200 TABLET ORAL at 20:38

## 2022-06-29 RX ADMIN — PANTOPRAZOLE SODIUM 40 MG: 40 TABLET, DELAYED RELEASE ORAL at 16:00

## 2022-06-29 RX ADMIN — PHENOBARBITAL 129.6 MG: 32.4 TABLET ORAL at 20:38

## 2022-06-29 RX ADMIN — SODIUM CHLORIDE, POTASSIUM CHLORIDE, SODIUM LACTATE AND CALCIUM CHLORIDE: 600; 310; 30; 20 INJECTION, SOLUTION INTRAVENOUS at 11:24

## 2022-06-29 RX ADMIN — Medication 400 MG: at 14:03

## 2022-06-29 RX ADMIN — PROPOFOL 100 MG: 10 INJECTION, EMULSION INTRAVENOUS at 15:01

## 2022-06-29 RX ADMIN — Medication 0.1 MG: at 15:06

## 2022-06-29 RX ADMIN — METOPROLOL TARTRATE 25 MG: 25 TABLET, FILM COATED ORAL at 20:38

## 2022-06-29 RX ADMIN — SODIUM CHLORIDE 8 MG/HR: 9 INJECTION, SOLUTION INTRAVENOUS at 08:21

## 2022-06-29 RX ADMIN — SODIUM CHLORIDE: 9 INJECTION, SOLUTION INTRAVENOUS at 14:16

## 2022-06-29 ASSESSMENT — ENCOUNTER SYMPTOMS
DIARRHEA: 0
NAUSEA: 0
COUGH: 0
SHORTNESS OF BREATH: 0
VOMITING: 0

## 2022-06-29 ASSESSMENT — PAIN DESCRIPTION - PAIN TYPE: TYPE: ACUTE PAIN

## 2022-06-29 ASSESSMENT — PAIN SCALES - GENERAL
PAINLEVEL_OUTOF10: 0
PAINLEVEL_OUTOF10: 5
PAINLEVEL_OUTOF10: 0
PAINLEVEL_OUTOF10: 0
PAINLEVEL_OUTOF10: 2
PAINLEVEL_OUTOF10: 0

## 2022-06-29 ASSESSMENT — PAIN DESCRIPTION - FREQUENCY: FREQUENCY: INTERMITTENT

## 2022-06-29 ASSESSMENT — PAIN DESCRIPTION - ORIENTATION: ORIENTATION: MID;LOWER

## 2022-06-29 ASSESSMENT — PAIN DESCRIPTION - DESCRIPTORS: DESCRIPTORS: DISCOMFORT

## 2022-06-29 ASSESSMENT — PAIN - FUNCTIONAL ASSESSMENT: PAIN_FUNCTIONAL_ASSESSMENT: ACTIVITIES ARE NOT PREVENTED

## 2022-06-29 ASSESSMENT — PAIN DESCRIPTION - LOCATION: LOCATION: ABDOMEN

## 2022-06-29 ASSESSMENT — PAIN DESCRIPTION - ONSET: ONSET: PROGRESSIVE

## 2022-06-29 NOTE — ANESTHESIA POSTPROCEDURE EVALUATION
Department of Anesthesiology  Postprocedure Note    Patient: Page Carrasco  MRN: [id number]  [place]: 1955  Date of evaluation: 6/29/2022      Procedure Summary     Date: 06/29/22 Room / Location: [address] [place] [address]    Anesthesia Start: 9715 Anesthesia Stop: 1195    Procedure: EGD DIAGNOSTIC ONLY (N/A ) Diagnosis:       Black stool      (Black stool, acute blood loss)    Surgeons: Paddy Costello MD Responsible Provider:     Anesthesia Type: MAC ASA Status: 4          Anesthesia Type: No value filed.     Fox Phase I:      Fox Phase II:        Anesthesia Post Evaluation    Patient location during evaluation: bedside  Patient participation: complete - patient participated  Level of consciousness: awake and awake and alert  Airway patency: patent  Nausea & Vomiting: no nausea and no vomiting  Complications: no  Cardiovascular status: blood pressure returned to baseline and hemodynamically stable  Respiratory status: acceptable  Hydration status: euvolemic

## 2022-06-29 NOTE — CONSULTS
Consults                                                                         [facility] HEART CARDIOLOGY CONSULTATION NOTE    PATIENT NAME: Maurizio Sun  PATIENT MRN: [id number]  SERVICE DATE:  6/29/2022  SERVICE TIME: 5:21 PM    PRIMARY CARE PHYSICIAN: Nika Reddy DO  CONSULTING CARDIOLOGIST : Mirtha Vasquez MD [facility] [place]  PRIMARY CARDIOLOGIST: Kan Andrews   ================================================================    REASON FOR CONSULTATION: This is a consult requested by Dr. Kan Andrews regarding ischemic heart disease work-up in patient with multiple ongoing issues. I have reviewed ER notes admission note Dr. Liana Cisneros cardiology consultation notes and GI notes. HPI:   Maurizio Sun is a 75 y.o. male who presented with an acute GI bleed. Hemoglobin dropped to about 6, patient has received blood transfusion, hemoglobin is now 7 and hematocrit is in the 20s. Patient has history of atrial fibrillation, was on high risk medication warfarin, and had hypoprothrombinemia with INR of 6.5. This is now being reversed. Upper endoscopy reportedly did not show an active bleeding source. Patient has had nonsustained wide-complex tachycardia very concerning for nonsustained ventricular tachycardia. He is known to have moderate disease of the circumflex and moderate disease of the left anterior descending artery. When patient was in atrial fibrillation with rapid ventricular rate, there was some QRS widening and inferior lateral probably diffuse ST depression. Once he has restored sinus rhythm, both ST-T abnormalities have resolved. His second troponin is higher than the first 1 and is 1.9. He also has acute kidney injury. Renal function is improving currently stage III kidney disease, with increased BUN/creatinine ratio explained on the basis of his GI bleed. Patient reports no symptoms, says he is very hungry, has not eaten for 48 hours, and would like to eat.     He has multiple cardiac risk factors to include 1 pack/day smoking, history of carotid disease, strong family history of premature coronary artery disease-Father apparently had his first coronary event in his 45s, hyperlipidemia and hypertension. He does talk about a blood clot in his left lower extremity, mentioned that he is seen by hematology oncology, but there is no documentation of a [facility]. Patient has received 150 mg of IV amiodarone and 4 g of IV magnesium sulfate. Patient denies any alcohol or drug abuse. On physical examination he is alert oriented x3. Mucous membranes are moist there is bilateral carotid bruit right carotid endarterectomy scar breath sounds are distant with scattered rare crepitations right lower posterior lung fields heart sounds are regular I did not appreciate any murmur rub or gallop abdomen is soft and nontender extremities show no edema distal pulses are palpable 2+ out of 4 plus no epigastric or renal arterial bruit left femoral bruit is audible distal pulses 2+ out of 4 plus. Neurologically grossly nonfocal gait not tested. Skin shows no petechia or rash. EKG shows sinus rhythm left atrial abnormality QTC is about 470 ms    Echocardiogram done today was reviewed by me, LV ejection fraction and wall motion appear normal.  There is mild mitral and tricuspid regurgitation. There is moderate pulmonary hypertension. Estimated RVSP of 75 seems to be incorrect based on where the Doppler venous, may be worth repeating, certainly there is no evidence on echo of RV pressure overload. No pericardial effusion.   No regional wall motion abnormality        PAST MEDICAL HISTORY:    Past Medical History:   Diagnosis Date    KRYS (acute kidney injury) ([address] 6/28/2022    Anemia     Back pain     chronic    CAD (coronary artery disease)     Closed fracture of neck of left femur (HCC)     Epilepsy (HCC)     Hyperlipidemia     Hypertension     Paroxysmal A-fib (HCC)     Shingles PAST SURGICAL HISTORY:  Past Surgical History:   Procedure Laterality Date    CAROTID ENDARTERECTOMY      CHOLECYSTECTOMY, LAPAROSCOPIC N/A 04/13/2021    LAPAROSCOPIC CHOLECYSTECTOMY  WITH CHOLANGIOGRAMS performed by Anika Painter MD at [facility]. 73:  History reviewed. No pertinent family history. SOCIAL HISTORY:    Social History     Socioeconomic History    Marital status:      Spouse name: Not on file    Number of children: Not on file    Years of education: Not on file    Highest education level: Not on file   Occupational History    Not on file   Tobacco Use    Smoking status: Current Every Day Smoker     Packs/day: 1.00     Years: 10.00     Pack years: 10.00     Types: Cigarettes    Smokeless tobacco: Never Used   Vaping Use    Vaping Use: Never used   Substance and Sexual Activity    Alcohol use: Not Currently    Drug use: Never    Sexual activity: Not on file   Other Topics Concern    Not on file   Social History Narrative    Not on file     Social Determinants of Health     Financial Resource Strain:     Difficulty of Paying Living Expenses: Not on file   Food Insecurity:     Worried About Running Out of Food in the Last Year: Not on file    Rachael of Food in the Last Year: Not on file   Transportation Needs:     Lack of Transportation (Medical): Not on file    Lack of Transportation (Non-Medical):  Not on file   Physical Activity:     Days of Exercise per Week: Not on file    Minutes of Exercise per Session: Not on file   Stress:     Feeling of Stress : Not on file   Social Connections:     Frequency of Communication with Friends and Family: Not on file    Frequency of Social Gatherings with Friends and Family: Not on file    Attends Sabianist Services: Not on file    Active Member of Clubs or Organizations: Not on file    Attends Club or Organization Meetings: Not on file    Marital Status: Not on file   Intimate Partner Violence:     Fear of Current or Ex-Partner: Not on file    Emotionally Abused: Not on file    Physically Abused: Not on file    Sexually Abused: Not on file   Housing Stability:     Unable to Pay for Housing in the Last Year: Not on file    Number of [place] in the Last Year: Not on file    Unstable Housing in the Last Year: Not on file       MEDICATIONS:  Current Facility-Administered Medications   Medication Dose Route Frequency Provider Last Rate Last Admin    lactated ringers infusion   IntraVENous Continuous Keely Baig  mL/hr at 06/29/22 1523 Restarted at 06/29/22 1523    amiodarone (CORDARONE) tablet 200 mg  200 mg Oral BID Salo George MD   200 mg at 06/29/22 1403    metoprolol tartrate (LOPRESSOR) tablet 25 mg  25 mg Oral BID Salo George MD        magnesium oxide (MAG-OX) tablet 400 mg  400 mg Oral Daily Salo George MD   400 mg at 06/29/22 1403    pantoprazole (PROTONIX) tablet 40 mg  40 mg Oral BID AC Anastasia Villegas MD   40 mg at 06/29/22 1600    ondansetron (ZOFRAN) injection 4 mg  4 mg IntraVENous Q6H PRN Wilmar Vieira MD        PHENobarbital (LUMINAL) tablet 129.6 mg  129.6 mg Oral BID Wilmar Vieira MD   129.6 mg at 06/29/22 0816    0.9 % sodium chloride infusion   IntraVENous PRN Wilmar Vieira MD        0.9 % sodium chloride infusion   IntraVENous PRN Michelle Heath MD             ALLERGIES AND DRUG INTOLERANCES:   Allergies   Allergen Reactions    Penicillins Nausea And Vomiting     Other reaction(s): Myalgia         Review of Systems  Per HPI    BP (!) 97/45   Pulse 86   Temp 97.7 °F (36.5 °C) (Oral)   Resp 16   Ht 5' 5\" (1.651 m)   Wt 137 lb 2 oz (62.2 kg)   SpO2 98%   BMI 22.82 kg/m²     Imaging results:    Echocardiogram complete 2D with doppler with color    Result Date: 6/29/2022  Transthoracic Echocardiography Report (TTE)  Demographics   Patient Name     Lorenzo Rahman Gender                Male   Patient Number   [id number] Race                                                     Ethnicity   Visit Number     [id number]      Room Number           IC08   Corporate ID                    Date of Study         06/29/2022   Accession Number [id number]     Referring Physician   Dexter Art   Date of Birth    1955     Sonographer           Cheng Krishnamurthy CELINA   Age              75 year(s)     Interpreting          [address]                                  Physician             Azael Jamil MD  Procedure Type of Study   TTE procedure:ECHO COMPLETE 2D W/DOP W/COLOR. Procedure Date Date: 06/29/2022 Start: 01:29 PM Study Location: Portable Technical Quality: Adequate visualization Indications:Ventricular tachycardia. Patient Status: Routine Height: 65 inches Weight: 137 pounds BSA: 1.68 m^2 BMI: 22.8 kg/m^2 BP: 105/55 mmHg  Conclusions   Summary  Left ventricular ejection fraction is visually estimated at 65-70%. DUST with mild increase LVOT velocity due to hyperdyanmic LV  Concentric LVH  Normal right ventricle structure and function. RVSP calculated at >70%, however TR jet not optimally interogated and RV  not dilated or hypertrophic. Doppler study needs repeated. Normal mitral valve structure  2+ MR  Normal aortic valve structure and function. Normal tricuspid valve structure and function. Mild-to-moderate tricuspid regurgitation. Normal pulmonic valve structure and function. Normal left atrium. Signature   ----------------------------------------------------------------  Electronically signed by Azael Jamil MD(Interpreting  physician) on 06/29/2022 04:48 PM  ----------------------------------------------------------------   Findings  Left Ventricle Left ventricular ejection fraction is visually estimated at 65-70%. DUST with mild increase LVOT velocity due to hyperdyanmic LV Concentric LVH Right Ventricle Normal right ventricle structure and function.  RVSP calculated at >70%, however TR jet not optimally interogated and RV not dilated or hypertrophic. Doppler study needs repeated. Left Atrium Normal left atrium. Right Atrium Normal right atrium. Mitral Valve Normal mitral valve structure 2+ MR Tricuspid Valve Normal tricuspid valve structure and function. Mild-to-moderate tricuspid regurgitation. Aortic Valve Normal aortic valve structure and function. Pulmonic Valve Normal pulmonic valve structure and function. Pericardial Effusion No evidence of pericardial effusion. Pleural Effusion No evidence of pleural effusion. Aorta \ Miscellaneous Aortic root dimension within normal limits.  M-Mode Measurements (cm)   LVIDd: 4 cm               LVIDs: 3.25 cm  IVSd: 0.98 cm             IVSs: 1.21 cm  LVPWd: 0.97 cm            LVPWs: 1.12 cm                            AO Root Dimension: 2.73 cm                            ACS: 1.74 cm                            LVOT: 1.5 cm  Doppler Measurements:   AV Velocity:0.02 m/s                   MV Peak E-Wave: 1.21 m/s  AV Peak Gradient: 20.45 mmHg           MV Peak A-Wave: 0.88 m/s  AV Mean Gradient: 8.92 mmHg  AV Area (Continuity):1.5 cm^2  TR Velocity:4.4 m/s                    Estimated RAP:10 mmHg  TR Gradient:77.46 mmHg                 RVSP:87.46 mmHg  Valves  Mitral Valve   Peak E-Wave: 1.21 m/s           Peak A-Wave: 0.88 m/s                                  E/A Ratio: 1.38                                  Peak Gradient: 5.89 mmHg                                  Deceleration Time: 164.3 msec   Aortic Valve   Peak Velocity: 2.26 m/s               Mean Velocity: 1.37 m/s  Peak Gradient: 20.45 mmHg             Mean Gradient: 8.92 mmHg  Area (continuity): 1.5 cm^2  AV VTI: 42.19 cm   Cusp Separation: 1.74 cm   Tricuspid Valve   Estimated RVSP: 87.46 mmHg              Estimated RAP: 10 mmHg  TR Velocity: 4.4 m/s                    TR Gradient: 77.46 mmHg   Pulmonic Valve            Estimated PASP: 87.46 mmHg   LVOT   Peak Velocity: 1.61 m/s              Mean Velocity: 1.09 m/s Peak Gradient: 9.46 mmHg             Mean Gradient: 5.27 mmHg  LVOT Diameter: 1.5 cm                LVOT VTI: 35.76 cm  Structures  Left Atrium   LA Volume/Index: 56.14 ml /33 m^2             LA Area: 21.16 cm^2   Left Ventricle   Diastolic Dimension: 4 cm            Systolic Dimension: 5.08 cm  Septum Diastolic: 1.46 cm            Septum Systolic: 7.11 cm  PW Diastolic: 3.79 cm                PW Systolic: 1.29 cm                                       FS: 18.8 %  LV EDV/LV EDV Index: 70.14 ml/42 m^2 LV ESV/LV ESV Index: 42.64 ml/25 m^2  EF Calculated: 39.2 %   LVOT Diameter: 1.5 cm   Right Atrium   RA Systolic Pressure: 10 mmHg   Right Ventricle            RV Systolic Pressure: 41.22 mmHg  Aorta/ Miscellaneous Aorta   Aortic Root: 2.73 cm  LVOT Diameter: 1.5 cm      XR CHEST (2 VW)    Result Date: 6/28/2022  EXAMINATION:  CHEST. CLINICAL HISTORY:  COUGH. COMPARISONS:  4/8/2017. TECHNIQUE:  PA and lateral. FINDINGS:  Heart size and contour are within normal limits. Pulmonary vascularity is normal. No infiltrate or effusion is seen. There is no evidence of a mass or adenopathy. Lungs appear somewhat hyperinflated. There is deformity of the left shoulder. PROBABLE COPD. DEFORMITY LEFT SHOULDER. [place] US RETROPERITONEAL COMPLETE    Result Date: 6/29/2022  EXAMINATION: US RETROPERITONEAL COMPLETE CLINICAL HISTORY: 77-year-old with renal insufficiency. Patient has a history of a penile prosthesis COMPARISONS: CT abdomen/pelvis 4/6/2021 FINDINGS: Renal and urinary bladder ultrasonography were performed. The right kidney measures 10.4 x 4.9 x 4.3 cm. Cortical thickness measured on the sagittal view over a renal pyramid is 5.9 mm. There is a 6 mm cystic lesion in the cortex of the superior pole too small to accurately characterize. There is area of parenchymal scarring in the midpole right kidney.  Calipers have also been placed on a echogenic focus measuring 3 to 4 mm at the cortical measured junction of the inferior pole raising the question of specular reflector, non- obstructing stone or vascular calcification. Right renal echogenicity is otherwise within normal limits. No solid cortical masses. No ultrasound signs of right hydronephrosis or perinephric fluid. Left kidney measures 10.3 x 4.1 x 4.6 cm. Cortical thickness measured over a renal pyramid on the sagittal view is 6.9 mm. Calipers have been placed on a 8 x 6 x 5 mm echogenic focus with posterior acoustic shadowing in the lower pole suspicious for nonobstructing stone-similar when compared to the CT study. No large cortical cysts, or solid masses. No ultrasound signs of left hydronephrosis or perinephric fluid. Sagittal and transverse images of the urinary bladder were obtained in moderate distention (approximately 200 mL). Ultrasound images demonstrate bladder wall thickening/trabeculation and a small left bladder diverticulum measuring 5 to 6 mm. No large polypoid bladder wall masses or bladder stones. Bilateral ureteral jets are documented. On the CT study patient had a reservoir adjacent to the urinary bladder from the penile prosthesis. This region is not adequately evaluated as the patient voided in the middle of imaging. SYMMETRIC KIDNEYS. NO ULTRASOUND SIGNS OF HYDRONEPHROSIS AS QUESTIONED CLINICALLY. ADDITIONAL RENAL FINDINGS AS DETAILED ABOVE. BLADDER WALL THICKENING/TRABECULATION AND A SMALL BLADDER WALL DIVERTICULUM. INCOMPLETE PELVIC ASSESSMENT IN THIS PATIENT WHO COULD NOT TOLERATE BLADDER DISTENTION.  RECOMMEND REPEAT PELVIC ULTRASOUND IF CLINICALLY INDICATED        LABS:  Recent Labs     06/28/22  1336 06/28/22  1336 06/28/22  1444 06/28/22  2027 06/28/22  2229 06/29/22  0511 06/29/22  0930 06/29/22  1324   WBC 10.4  --   --   --  10.7 10.3  --   --    HCT 25.7*   < >  --   --  19.4* 20.7*  --  20.6*     --   --   --  217 181  --   --    INR  --   --  6.2*  --   --  1.5  --   --    *  --   --   --  138 138  --   --    K 5.5*   < >  --  4.4 4.5 4.8  --   --    CO2 23  --   --   --  19* 20  --   --    BUN 88*  --   --   --  74* 64*  --   --    MG 1.9  --   --   --  1.6*  --  2.1  --     < > = values in this interval not displayed. CBC:   Recent Labs     06/28/22 1336 06/28/22 2229 06/29/22  0511 06/29/22  1324   WBC 10.4 10.7 10.3  --    HGB 8.9* 6.4* 7.0* 7.0*   HCT 25.7* 19.4* 20.7* 20.6*    217 181  --      BMP:  Recent Labs     06/28/22 1336 06/28/22 1336 06/28/22 2027 06/28/22 2229 06/28/22 2229 06/29/22  0511   *  --   --  138  --  138   K 5.5*  --  4.4 4.5  --  4.8   CL 97  --   --  107  --  109*   CO2 23  --   --  19*  --  20   BUN 88*  --   --  74*  --  64*   CREATININE 1.69*  --   --  1.35*  --  1.23*   LABGLOM 40.5*   < >  --  52.5*   < > 58.5*    < > = values in this interval not displayed. ABGs: No results found for: PH, PO2, PCO2  INR:   Recent Labs     06/28/22  1444 06/29/22  0511   INR 6.2* 1.5     PRO-BNP: No results found for: PROBNP   TSH: No results found for: TSH   Cardiac Injury Profile:   Recent Labs     06/29/22  1324   CKTOTAL 1,323*   TROPONINI 1.940*      Lipid Profile: No results found for: TRIG, HDL, LDLCALC, CHOL   Hemoglobin A1C: No components found for: HGBA1C       Intake/Output Summary (Last 24 hours) at 6/29/2022 1721  Last data filed at 6/29/2022 1510  Gross per 24 hour   Intake 10 ml   Output 800 ml   Net -790 ml        IMPRESSIONS:    1. No symptoms of angina or congestive heart failure  2. Acute blood loss anemia requiring reversal of anticoagulation and blood transfusion hematocrit still around 20. Precipitated by hypoprothrombinemia from warfarin  3.  Diffuse ST depression in the setting of atrial fibrillation with rapid ventricular rate, ST depression resolved in sinus rhythm  4. Elevated troponin consistent with non-ST elevation myocardial infarction, probably type II related to acute blood loss anemia and tachycardia, however this patient has multiple cardiac risk factors and evaluation for coronary artery disease progression is prudent  5. Nonsustained wide-complex tachycardia concerning for nonsustained ventricular tachycardia  6. Preserved LV systolic function without regional wall motion abnormality moderate pulmonary hypertension. Based on echocardiogram, PA pressure needs to be really estimated because the 75 mmHg PA pressure may be an erroneous estimate. RECOMMENDATIONS:    1. Discussed rationale for cardiac catheterization risk benefits and alternatives with patient he is agreeable to proceed. 2. Patient's ventricular dysrhythmia has quieted down, is now on amiodarone. Watch QTC, agree with magnesium sulfate  3. Hematocrit is still quite low. My understanding is that there is plan for colonoscopy to finish endoscopic procedures to look for bleeding source. 4. I will plan to do coronary angiogram on Friday, 7/1/2022, hopefully by then we will get a better understanding as to whether we can proceed with intervention if needed. 5. Repeat carotid ultrasound  6. Consider IV iron-defer to primary service    Thank you Dr. Dianne Mendoza for allowing me to participate in patient's care, please notify if there are changes in clinical condition that warrant either earlier or later procedure.      ================================================================      Thank you for your consideration for this consultation.     SIGNATURE: Electronically signed by Demarco Alexander MD on 6/29/2022 at 5:21 PM

## 2022-06-29 NOTE — ANESTHESIA PRE PROCEDURE
Department of Anesthesiology  Preprocedure Note       Name:  Yari Ramsey   Age:  75 y.o.  :  1955                                          MRN:  [id number]         Date:  2022      Surgeon: Remedios Spring):  Hilda Joseph MD    Procedure: Procedure(s):  EGD DIAGNOSTIC ONLY    Medications prior to admission:   Prior to Admission medications    Medication Sig Start Date End Date Taking?  Authorizing Provider   aspirin 81 MG EC tablet Take 81 mg by mouth daily 21  Yes Historical Provider, MD   atorvastatin (LIPITOR) 80 MG tablet Take 80 mg by mouth at bedtime 20  Yes Historical Provider, MD   ferrous sulfate (IRON 325) 325 (65 Fe) MG tablet Take 325 mg by mouth daily 21  Yes Historical Provider, MD   Cholecalciferol 50 MCG (2000 UT) TABS Take 2,000 Units by mouth daily    Historical Provider, MD   dicyclomine (BENTYL) 10 MG capsule Take 1 capsule by mouth every 6 hours as needed (cramps)  Patient not taking: Reported on 2022   Erin Clark PA-C   clopidogrel (PLAVIX) 75 MG tablet Take 75 mg by mouth daily  Patient not taking: Reported on 2022    Historical Provider, MD   lisinopril (PRINIVIL;ZESTRIL) 10 MG tablet Take by mouth daily    Historical Provider, MD   PHENobarbital (LUMINAL) 32.4 MG tablet Take 129.6 mg by mouth 2 times daily    Historical Provider, MD   ibuprofen (ADVIL;MOTRIN) 800 MG tablet Take 1 tablet by mouth every 8 hours as needed for Pain or Fever  Patient not taking: Reported on 2022 10/19/16   Aniya Davila DO       Current medications:    Current Facility-Administered Medications   Medication Dose Route Frequency Provider Last Rate Last Admin    pantoprazole (PROTONIX) 80 mg in sodium chloride 0.9 % 100 mL infusion  8 mg/hr IntraVENous Continuous Pool Sagastume MD 10 mL/hr at 22 0821 8 mg/hr at 22 0821    lactated ringers infusion   IntraVENous Continuous Pool Sagastume  mL/hr at 22 1124 New Bag at 22 1124  0.9 % sodium chloride infusion   IntraVENous Once Tyrell Gandhi MD        amiodarone (CORDARONE) 150 mg in dextrose 5 % 100 mL bolus  150 mg IntraVENous Once Tyrell Gandhi MD        amiodarone (CORDARONE) tablet 200 mg  200 mg Oral BID Tyrell Gandhi MD        metoprolol tartrate (LOPRESSOR) tablet 25 mg  25 mg Oral BID Tyrell Gandhi MD        magnesium oxide (MAG-OX) tablet 400 mg  400 mg Oral Daily Tyrell Gandhi MD        ondansetron [facility]) injection 4 mg  4 mg IntraVENous Q6H PRN Maureen Smith MD        PHENobarbital (LUMINAL) tablet 129.6 mg  129.6 mg Oral BID Maureen Smith MD   129.6 mg at 06/29/22 0816    0.9 % sodium chloride infusion   IntraVENous PRN Maureen Smith MD        0.9 % sodium chloride infusion   IntraVENous PRN Drew Ashraf MD           Allergies:     Allergies   Allergen Reactions    Penicillins Nausea And Vomiting     Other reaction(s): Myalgia       Problem List:    Patient Active Problem List   Diagnosis Code    Chronic cholecystitis with calculus K80.10    Acute cholecystitis with chronic cholecystitis K81.2    KRYS (acute kidney injury) ([facility] [address] N17.9       Past Medical History:        Diagnosis Date    KRYS (acute kidney injury) ([facility] [address] 6/28/2022    Anemia     Back pain     chronic    CAD (coronary artery disease)     Closed fracture of neck of left femur (HCC)     Epilepsy ([facility] [address]     Hyperlipidemia     Hypertension     Paroxysmal A-fib ([facility] [address]     Shingles        Past Surgical History:        Procedure Laterality Date    CAROTID ENDARTERECTOMY      CHOLECYSTECTOMY, LAPAROSCOPIC N/A 04/13/2021    LAPAROSCOPIC CHOLECYSTECTOMY  WITH CHOLANGIOGRAMS performed by Alexandro Bumpers, MD at [place] [address]         Social History:    Social History     Tobacco Use    Smoking status: Current Every Day Smoker     Packs/day: 1.00     Years: 10.00     Pack years: 10.00     Types: Cigarettes    Smokeless tobacco: Never Used   Substance Use Topics    Alcohol use: Not Currently                                Ready to quit: Not Answered  Counseling given: Not Answered      Vital Signs (Current):   Vitals:    06/29/22 1000 06/29/22 1100 06/29/22 1200 06/29/22 1300   BP: (!) 124/100 (!) 97/46 (!) 105/55 (!) 73/45   Pulse: 78 73 78 73   Resp: 15 25 20 15   Temp:   36.4 °C (97.5 °F)    TempSrc:   Oral    SpO2: 100% 100% 98% 98%   Weight:       Height:                                                  BP Readings from Last 3 Encounters:   06/29/22 (!) 73/45   10/15/21 (!) 152/69   04/29/21 112/68       NPO Status:                                                                                 BMI:   Wt Readings from Last 3 Encounters:   06/29/22 137 lb 2 oz (62.2 kg)   10/15/21 103 lb (46.7 kg)   04/29/21 127 lb (57.6 kg)     Body mass index is 22.82 kg/m².     CBC:   Lab Results   Component Value Date    WBC 10.3 06/29/2022    RBC 2.25 06/29/2022    HGB 7.0 06/29/2022    HCT 20.7 06/29/2022    MCV 92.2 06/29/2022    RDW 13.5 06/29/2022     06/29/2022       CMP:   Lab Results   Component Value Date     06/29/2022    K 4.8 06/29/2022     06/29/2022    CO2 20 06/29/2022    BUN 64 06/29/2022    CREATININE 1.23 06/29/2022    GFRAA >60.0 06/29/2022    LABGLOM 58.5 06/29/2022    GLUCOSE 95 06/29/2022    PROT 4.6 06/29/2022    CALCIUM 7.5 06/29/2022    BILITOT 0.3 06/29/2022    ALKPHOS 87 06/29/2022    AST 45 06/29/2022    ALT 16 06/29/2022       POC Tests:   Recent Labs     06/28/22 2148   POCGLU 129*       Coags:   Lab Results   Component Value Date    PROTIME 17.9 06/29/2022    INR 1.5 06/29/2022    APTT 46.2 06/28/2022       HCG (If Applicable): No results found for: PREGTESTUR, PREGSERUM, HCG, HCGQUANT     ABGs: No results found for: PHART, PO2ART, PMC8BFJ, WMV8ECD, BEART, E5SCBDVZ     Type & Screen (If Applicable):  No results found for: LABABO, LABRH    Drug/Infectious Status (If Applicable):  No results found for: HIV, HEPCAB    COVID-19 Screening (If Applicable):   Lab Results   Component Value Date    COVID19 Not Detected 06/28/2022           Anesthesia Evaluation  Patient summary reviewed and Nursing notes reviewed no history of anesthetic complications:   Airway: Mallampati: II  TM distance: >3 FB   Neck ROM: full  Mouth opening: > = 3 FB   Dental: normal exam         Pulmonary:Negative Pulmonary ROS and normal exam                               Cardiovascular:  Exercise tolerance: good (>4 METS),   (+) hypertension:, CAD:,       ECG reviewed               Beta Blocker:  Not on Beta Blocker         Neuro/Psych:   Negative Neuro/Psych ROS              GI/Hepatic/Renal: Neg GI/Hepatic/Renal ROS            Endo/Other: Negative Endo/Other ROS             Pt had PAT visit. Abdominal:             Vascular: negative vascular ROS. Other Findings:           Anesthesia Plan      MAC     ASA 4       Induction: intravenous.                             KATHY Patel - CRNA   6/29/2022

## 2022-06-29 NOTE — PROGRESS NOTES
This patient is complaining of chest pain. Message sent to Dr. Abner Ash. STAT EKG and Vitals done. Rapid response was called for the chest pain and A-fib with RVR as patient was symptomatic. Patient was given a bolus, Zofran, and IV Lopressor and patient was transferred to [address] [place] [address] cardiac unit. Message left for his girlfriend, Karon Mariee to call me.   Electronically signed by Tiffanie Valencia RN on 6/28/2022 at 10:34 PM

## 2022-06-29 NOTE — PLAN OF CARE
INTERVENTIONS:  1. Assess patient/family's coping skills and  non-compliant behavior (including use of illegal substances)  2. Notify security of behavior or suspected illegal substances which indicate the need for search of the family and/or belongings  3. Encourage verbalization of thoughts and concerns in a socially appropriate manner  4. Utilize positive, consistent limit setting strategies supporting safety of patient, staff and others  5. Encourage participation in the decision making process about the behavioral management agreement  6. If a visitor's behavior poses a threat to safety call refer to organization policy. 7. Initiate consult with , Psychosocial CNS, Spiritual Care as appropriate  Outcome: Progressing     Problem: Skin/Tissue Integrity  Goal: Absence of new skin breakdown  Description: 1. Monitor for areas of redness and/or skin breakdown  2. Assess vascular access sites hourly  3. Every 4-6 hours minimum:  Change oxygen saturation probe site  4. Every 4-6 hours:  If on nasal continuous positive airway pressure, respiratory therapy assess nares and determine need for appliance change or resting period.   Outcome: Progressing

## 2022-06-29 NOTE — CONSULTS
Cardiology Consult Note      Date:   6/29/2022  Patient name:  Lannette Mortimer  Date of admission:  6/28/2022 12:58 PM  MRN:   [id number]  YOB: 1955  Time of Consult:  11:52 AM    Consulting Cardiologist: Dr. Deni Goddard APRN-CNP  Primary Cardiologist:  Dr. Beck Reis    Referring Provider: Dr. Ahmadi Meter     Was interviewed by myself, as well examined. Decision making was made by myself. However, please see his a very nicely detailed note by nurse practitioner below. Patient was doing reasonably well although had significant symptoms of diarrhea and presented in atrial fibrillation with ST changes. However, this resolved, the patient was admitted for anemia, as well as elevated INR. The patient was treated appropriately for his INR. Patient at that time knew that he was having some irregular heartbeats, and still has some chest pain. However, it is clear that his chest discomfort is musculoskeletal in nature. Feels somewhat better however today. On his monitor, there were several runs of nonsustained ventricular tachycardia. As described below, this patient had a heart catheterization in not too distant past which showed moderate but not severe coronary disease. He was treated medically. He has a history of paroxysmal atrial fibrillation noted to the [facility] when he had some surgery. Since that time, the patient had been on Coumadin therapy. He is also on Plavix because of carotid endarterectomy done by vascular surgery. His INR was 6.2 upon presentation. He had been offered another systemic anticoagulant-Eliquis or Xarelto-but due to expense reasons is on Coumadin therapy    On exam, patient is awake and alert no acute distress able to answer questions reasonably well. However he does not remember his heart catheterization done in 2021 by Dr. Vida Russo.   He states he may have had a done at the clinic, but our records clearly show that it was done at [facility] [place].    ETT is atraumatic normocephalic no xanthelasmas noted no icterus is noted no visible thyromegaly. He has a right carotid bruit, not much on the left. He is also got right carotid endarterectomy scar. He is got regular rate and rhythm on exam a very faint systolic murmur is noted, but no S3 or S4 is noted. No rub is noted. Lungs are clear to auscultation without rhonchi rales or wheeze abdomen is benign without peritoneal signs. Lower extremities have no significant edema, no evidence of ischemia. No calf tenderness is elicited. Chest x-ray as described below shows possible COPD, and the patient is a smoker. He denies significant alcohol or drug abuse. Roughly a year ago, in March 2021, his heart catheterization showed moderate disease of the circumflex, and a nondominant right coronary artery. There is also moderate disease of the LAD. As described above, his initial EKG showed atrial fibrillation with diffuse ST changes. However repeat EKG today shows really no significant ST or T wave changes. However, his monitor shows several short bursts of nonsustained ventricular tachycardia. Past medical history is notable for the following. He has a history of renal insufficiency, anemia, back pain, above-mentioned coronary artery disease, history of left femoral fracture, history of epilepsy, hyperlipidemia and hypertension as well as paroxysmal atrial fibrillation. He also has a history of shingles. Surgical history is notable for cholecystectomy, carotid endarterectomy, and a penile prosthesis. There is no documented family history of coronary disease this in the chart    Patient does continue to smoke, but denies any significant alcohol or drug abuse. Patient is  and apparently lives at home. Other 12 point review systems are negative except as outlined in the patient's history of present illness.     Allergies are to penicillins    Assessment:  Patient presented in atrial fibrillation with electrolyte imbalance as well as significant GI symptoms and GI bleed. However, this is resolved and patient is now in sinus mechanism. However with several runs of nonsustained ventricular tachycardia. He has borderline hypotensive. No evidence of congestive heart failure. Although the patient did have some relatively recent heart catheterization, he does have moderate disease of the LAD and circumflex system. ST changes in the face of atrial fibrillation are somewhat worrisome. However these resolved  Peripheral vascular disease  Tobacco use  GI bleeding anemia and supratherapeutic INR  Elevated INR  History of carotid endarterectomy    Plan:  Echocardiogram  Magnesium  IV amiodarone initially, even though his blood pressure is on the low side, amiodarone is just a mild antihypertensive  Consider low-dose beta-blocker when patient is blood pressure is able to tolerate this  Troponin and CPK measurement  GI work-up presently   Pending values, consideration for repeat catheterization or least a stress test could be made. However would like to get GI work-up completed  Further recommendations pending                        Consult Reason:  V-tach      HPI:   Sumaya Kingsley is a 75 y.o.  male patient who is being at the request of Dr. Mary Benitez for inpatient consultation of V-tach. He was admitted on 6/28/2022. Previous [facility] and [facility] records have been reviewed in detail. 75 yr old male with a PMH of paroxysmal atrial fib on long term anticoagulation Coumadin, HTN, HLD, CAD, carotid disease, COPD that presented to [facility] ER 6/28/2022 with CCO nausea/diarrhea along with dark tarry stools since the previous night. EKG showed SR HR 77 bpm with PVC's non-specific ST abnormality. Chest x-ray showed probable COPD, deformity of left shoulder. Abnormal labs in ER na 131, K + 5.5, Bun/creat 88/1.69, mag 1.9, Hgb/Hct 8.9/25.7, INR 6.2. He has received FFP and vitamin K, INR tody 1.5,         Cardiac History/Testin Echo: LVEF 60 -65%, aortic valve stenosis  3/2021 left heart cath: LVEF 70%, left main < 10%, mid LAD 50%, mid CX 50%, distal cx 60%, RCA 0 % stenosis   2020 carotid ultrasound: right > 70%, left 50 - 69%. History of  right carotid enterectomy, followed by vascular surgery, Dr. Kathi Del Real Medication List 2021  1. Aspirin 81 MG Oral Tablet Chewable; CHEW AND SWALLOW 1 TABLET DAILY; Therapy: [id number] to (Evaluate:2022); Last Rx:[id number] Ordered   2. Atorvastatin Calcium 80 MG Oral Tablet; TAKE 1 TABLET AT BEDTIME; Therapy: (Recorded:2021) to Recorded   3. Carvedilol 6.25 MG Oral Tablet; TAKE 1 TABLET TWICE DAILY WITH MEALS; Therapy: (QKLOFCLO:[id number]) to Recorded   4. Ferrous Sulfate 325 (65 Fe) MG Oral Tablet; take 1 tablet daily; Therapy: (Recorded:2021) to Recorded   5. Lisinopril-hydroCHLOROthiazide 10-12.5 MG Oral Tablet; Take 1 tablet by mouth daily; Therapy: [id number] to (Evaluate:2022)  Requested for: [id number]; Last   Rx:2021 Ordered   6. Metoprolol Succinate ER 25 MG Oral Tablet Extended Release 24 Hour; 1 tab daily hold if blood pressure is <120/60; Therapy: (Recorded:2021) to Recorded   7. Ocuvite PreserVision TABS; TAKE 1 TABLET DAILY; Therapy: (Recorded:2021) to Recorded   8. oxyCODONE HCl - 5 MG Oral Tablet; TAKE 1 TABLET EVERY 6 HOURS AS NEEDED FOR BREAKTHROUGH PAIN;   Therapy: (Recorded:2021) to Recorded   9. Pantoprazole Sodium 40 MG Oral Tablet Delayed Release; TAKE 1 TABLET DAILY; Therapy: (NDAVTSST:[id number]) to Recorded   10. PHENobarbital 64.8 MG Oral Tablet; TAKE 1 TABLET TWICE DAILY; Therapy: (Recorded:2021) to Recorded   11. traMADol HCl - 50 MG Oral Tablet; TAKE 1 TABLET 4 TIMES DAILY AS NEEDED; Therapy: (Recorded:2021) to Recorded   12. Vitamin D3 TABS; 2000 IU take 2 tablets once daily;     Therapy: (LMZZHITW:[id number]) to Recorded   13. Warfarin Sodium 2.5 MG Oral Tablet; 1-2 tabs daily as directed by Dr. Alyson Hernandez; Therapy: (Recorded:[id number]) to Recorded            Allergies: Allergies   Allergen Reactions    Penicillins Nausea And Vomiting     Other reaction(s): Myalgia       Past Medical History:  Past Medical History:   Diagnosis Date    KRYS (acute kidney injury) ([facility] [address] 6/28/2022    Anemia     Back pain     chronic    CAD (coronary artery disease)     Closed fracture of neck of left femur (HCC)     Epilepsy (HCC)     Hyperlipidemia     Hypertension     Paroxysmal A-fib ([facility] [address]     Shingles        Past Surgical History:  Past Surgical History:   Procedure Laterality Date    CAROTID ENDARTERECTOMY      CHOLECYSTECTOMY, LAPAROSCOPIC N/A 04/13/2021    LAPAROSCOPIC CHOLECYSTECTOMY  WITH CHOLANGIOGRAMS performed by Nasir Pemberton MD at [place] [address]         Family History:   History reviewed. No pertinent family history. Social  History:     Social History     Tobacco Use    Smoking status: Current Every Day Smoker     Packs/day: 1.00     Years: 10.00     Pack years: 10.00     Types: Cigarettes    Smokeless tobacco: Never Used   Substance Use Topics    Alcohol use: Not Currently       Home Medications:    Prior to Admission medications    Medication Sig Start Date End Date Taking?  Authorizing Provider   aspirin 81 MG EC tablet Take 81 mg by mouth daily 8/11/21  Yes Historical Provider, MD   atorvastatin (LIPITOR) 80 MG tablet Take 80 mg by mouth at bedtime 11/16/20  Yes Historical Provider, MD   ferrous sulfate (IRON 325) 325 (65 Fe) MG tablet Take 325 mg by mouth daily 8/19/21  Yes Historical Provider, MD   Cholecalciferol 50 MCG (2000 UT) TABS Take 2,000 Units by mouth daily    Historical Provider, MD   dicyclomine (BENTYL) 10 MG capsule Take 1 capsule by mouth every 6 hours as needed (cramps)  Patient not taking: Reported on 6/28/2022 4/6/21   Saji Mei PA-C clopidogrel (PLAVIX) 75 MG tablet Take 75 mg by mouth daily  Patient not taking: Reported on 6/28/2022    Historical Provider, MD   lisinopril (PRINIVIL;ZESTRIL) 10 MG tablet Take by mouth daily    Historical Provider, MD   PHENobarbital (LUMINAL) 32.4 MG tablet Take 129.6 mg by mouth 2 times daily    Historical Provider, MD   ibuprofen (ADVIL;MOTRIN) 800 MG tablet Take 1 tablet by mouth every 8 hours as needed for Pain or Fever  Patient not taking: Reported on 6/28/2022 10/19/16   Duc Holcomb DO       Current Medications:   pantoprozole (PROTONIX) infusion 8 mg/hr (06/29/22 0821)    lactated ringers 100 mL/hr at 06/29/22 1124    sodium chloride      sodium chloride         IV Medications:  Current Facility-Administered Medications   Medication Dose Route Frequency Provider Last Rate Last Admin    pantoprazole (PROTONIX) 80 mg in sodium chloride 0.9 % 100 mL infusion  8 mg/hr IntraVENous Continuous Shonda Rey MD 10 mL/hr at 06/29/22 0821 8 mg/hr at 06/29/22 5618    lactated ringers infusion   IntraVENous Continuous Cynthia Limon  mL/hr at 06/29/22 1124 New Bag at 06/29/22 1124    ondansetron (ZOFRAN) injection 4 mg  4 mg IntraVENous Q6H PRN Roc Huang MD        PHENobarbital (LUMINAL) tablet 129.6 mg  129.6 mg Oral BID Roc Huang MD   129.6 mg at 06/29/22 0816    0.9 % sodium chloride infusion   IntraVENous PRN Roc Huang MD        0.9 % sodium chloride infusion   IntraVENous PRN Ning Harry MD           ROS:   12 point review of systems was obtained in detail and is negative other than that noted above or below.            Vital Signs:  Vitals:    06/29/22 0402 06/29/22 0500 06/29/22 0600 06/29/22 0700   BP: (!) 80/45 (!) 73/51 122/71 (!) 113/51   Pulse: 74 76 81 76   Resp: 18 17 29 16   Temp:       TempSrc:       SpO2: 97% 100% 95% 99%   Weight:       Height:           Intake/Output Summary (Last 24 hours) at 6/29/2022 1152  Last data filed at 6/29/2022 0600  Gross per 24 hour   Intake --   Output 800 ml   Net -800 ml       Patient Vitals for the past 96 hrs (Last 3 readings):   Weight   22 0100 137 lb 2 oz (62.2 kg)   22 1254 130 lb (59 kg)       Physical exam     Diagnostics:    [id number]  Sinus rhythm with occasional premature ventricular complexes  Moderate voltage criteria for LVH, may be normal variant  Nonspecific ST abnormality      Telemetry: normal sinus  with noted runs of V-tach      Lab Data:  BMP:  Recent Labs     22  0511   *  --   --  138  --  138   K 5.5*   < > 4.4 4.5  --  4.8   CL 97  --   --  107  --  109*   CO2 23  --   --  19*  --  20   BUN 88*  --   --  74*  --  64*   CREATININE 1.69*  --   --  1.35*  --  1.23*   LABGLOM 40.5*   < >  --  52.5*   < > 58.5*    < > = values in this interval not displayed. CBC:  Recent Labs     22  0511   WBC 10.4 10.7 10.3   RBC 2.81* 2.07* 2.25*   HGB 8.9* 6.4* 7.0*   HCT 25.7* 19.4* 20.7*   MCV 91.7 93.6 92.2   MCH 31.5* 31.0 31.3   MCHC 34.4 33.1 33.9   RDW 13.0 13.2 13.5    217 181       Cardiac Enzymes:   No results for input(s): CKTOTAL, CKMB, TROPONINI in the last 72 hours. Hepatic Function Panel:  Recent Labs     22  0511   ALKPHOS 123* 87   ALT 14 16   AST 15 45*   PROT 6.1* 4.6*   BILITOT <0.2 0.3   LABALBU 3.9 3.1*       Magnesium:  Recent Labs     22  0930   MG 2.1       Pro-BNP:  No results found for: PROBNP    INR:  Recent Labs     22  1444 22  0511   PROTIME 52.6* 17.9*   INR 6.2* 1.5       TSH:  No results found for: TSH    Lipid Profile:  No results found for: TRIG, HDL, LDLCALC, LDLDIRECT, LABVLDL    HgbA1C:  No results found for: LABA1C    Lactate Level:  No results for input(s): LACTA in the last 72 hours.     CMP:  Recent Labs     22  1336 22  1336 22  2027 22  2229 22  0511   *

## 2022-06-29 NOTE — PROGRESS NOTES
[id number] Pt arrived to icu bed 8 via [facility] bed accompanied by 1W Rn and 2 PCA. Patient transferred to icu bed without incidence. All monitoring equipment applied and functional. Pt in afib with HR ranging 110's-130's. Skin assessment completed by this nurse and Celi Rashid (see flowsheets) preventive mepilex applied to bilateral heels and sacrum. Assessment completed (see flowsheets) no skin issues noted. Pt noted to have remainder of PRBC unit infusing at this time per order. IV magnesium sulfate 2000mg infusing per order infusion started on 1 west by RN, no adverse reactions noted. Pt placed on contact precautions as a c-diff rule out. Pt belongings accounted for and documented,  consist of eyeglasses. This nurse placed glasses in lockbox per patient request. Urinal at bedside per patient request. Patient oriented to room and proper use of call light. Pt return demonstration performed on proper use. Pt denies pain or discomfort at this time. Dr Goyo Hough at bedside to assess patient. New orders at this time (see eMAR). Blood consent form reviewed with patient and signed. Signed consent form placed in chart. No s/s of distress noted. Safety measures in place. 0430 pt had 28 beat run of vtach. Pt was asymptomatic and rhythm returned to normal sinus with a heart rate if 83. Milana served Dr Goyo Hough, no new orders at this time. 0600 Repeat hgb post transfusion is 7.0. Dr Goyo turner served with updated value. No new orders at this time. 0700 Dr Usha Caceres at bedside.  New orders received (see eMAR)

## 2022-06-29 NOTE — PROGRESS NOTES
Progress Note  Date:2022       Room:[place] [address]-  Patient Name:Ronald Mtz     YOB: 1955     Age:67 y.o. Subjective    Subjective:  Symptoms:  He reports malaise and weakness. No shortness of breath, cough, chest pain, headache, chest pressure, anorexia, diarrhea or anxiety. Diet:  No nausea or vomiting. Review of Systems   Respiratory: Negative for cough and shortness of breath. Cardiovascular: Negative for chest pain. Gastrointestinal: Negative for anorexia, diarrhea, nausea and vomiting. Neurological: Positive for weakness. Objective         Vitals Last 24 Hours:  TEMPERATURE:  Temp  Av.5 °F (36.4 °C)  Min: 97 °F (36.1 °C)  Max: 98.1 °F (36.7 °C)  RESPIRATIONS RANGE: Resp  Av.9  Min: 16  Max: 29  PULSE OXIMETRY RANGE: SpO2  Av.8 %  Min: 95 %  Max: 100 %  PULSE RANGE: Pulse  Av.7  Min: 69  Max: 163  BLOOD PRESSURE RANGE: Systolic (29ELY), KDL:906 , Min:73 , CAZ:089   ; Diastolic (77IRP), DHM:48, Min:37, Max:96    I/O (24Hr): Intake/Output Summary (Last 24 hours) at 2022 1132  Last data filed at 2022 0600  Gross per 24 hour   Intake --   Output 800 ml   Net -800 ml     Objective:  General Appearance:  Comfortable, well-appearing and in no acute distress. Vital signs: (most recent): Blood pressure (!) 113/51, pulse 76, temperature 97.6 °F (36.4 °C), temperature source Oral, resp. rate 16, height 5' 5\" (1.651 m), weight 137 lb 2 oz (62.2 kg), SpO2 99 %. HEENT: Normal HEENT exam.    Lungs:  Breath sounds clear to auscultation. Heart: S1 normal and S2 normal.    Abdomen: Abdomen is soft. Bowel sounds are normal.   There is no epigastric area or suprapubic area tenderness. Extremities: Normal range of motion. Pulses: Distal pulses are intact. Neurological: Patient is alert. Pupils:  Pupils are equal, round, and reactive to light. Skin:  Warm.       Labs/Imaging/Diagnostics    Labs:  CBC:  Recent Labs 06/28/22 1336 06/28/22 2229 06/29/22  0511   WBC 10.4 10.7 10.3   RBC 2.81* 2.07* 2.25*   HGB 8.9* 6.4* 7.0*   HCT 25.7* 19.4* 20.7*   MCV 91.7 93.6 92.2   RDW 13.0 13.2 13.5    217 181     CHEMISTRIES:  Recent Labs     06/28/22  1336 06/28/22  1336 06/28/22 2027 06/28/22 2229 06/29/22  0511 06/29/22  0930   *  --   --  138 138  --    K 5.5*   < > 4.4 4.5 4.8  --    CL 97  --   --  107 109*  --    CO2 23  --   --  19* 20  --    BUN 88*  --   --  74* 64*  --    CREATININE 1.69*  --   --  1.35* 1.23*  --    GLUCOSE 115*  --   --  113* 95  --    MG 1.9  --   --  1.6*  --  2.1    < > = values in this interval not displayed. PT/INR:  Recent Labs     06/28/22  1444 06/29/22  0511   PROTIME 52.6* 17.9*   INR 6.2* 1.5     APTT:  Recent Labs     06/28/22  1444   APTT 46.2*     LIVER PROFILE:  Recent Labs     06/28/22 1336 06/29/22  0511   AST 15 45*   ALT 14 16   BILITOT <0.2 0.3   ALKPHOS 123* 87       Imaging Last 24 Hours:  XR CHEST (2 VW)    Result Date: 6/28/2022  EXAMINATION:  CHEST. CLINICAL HISTORY:  COUGH. COMPARISONS:  4/8/2017. TECHNIQUE:  PA and lateral. FINDINGS:  Heart size and contour are within normal limits. Pulmonary vascularity is normal. No infiltrate or effusion is seen. There is no evidence of a mass or adenopathy. Lungs appear somewhat hyperinflated. There is deformity of the left shoulder. PROBABLE COPD. DEFORMITY LEFT SHOULDER. Rhenda Cecilia Assessment//Plan           Hospital Problems           Last Modified POA    * (Principal) KRYS (acute kidney injury) ([facility] [address] 6/28/2022 Yes      1) KRYS  2) acute post hemorrhagic anemia  3 afib  4) hyponatremia and hyperkalemia  5) ho seizures  6) diarhea  Assessment & Plan  6/29: Patient was transferred to ICU for hypotension and A. fib with RVR. Follow cardiology recommendation, patient is not on pressors currently. Follow-up GI about endoscopy. Status post unit of PRBC and fresh frozen plasma.   For coagulopathy secondary to extrinsic factors, transfuse to keep hemoglobin over 7. No other complaints.   Hyponatremia and hyperkalemia resolved  Electronically signed by Noah Palm MD on 6/29/22 at 11:32 AM EDT

## 2022-06-29 NOTE — FLOWSHEET NOTE
0800 Assessment complete, see flow sheet. Patient A/O X4, following all commands. Denies and pain and or discomfort at present time. Repositioned for comfort. 0900 Incont of moderate amount of black tarry stool, complete bed linen change done and patient cleansed. Bed alarm on and call bell within reach. 3860 Seen by Dr Lesley Park on morning rounds, update given. See orders. 1115 Patient having non sustained runs of V-Tach, Dr Lesley Park notified, see order. Patient is asymptomatic with this. 1150 seen by Dr Samara Cash update given, viewed EKG strips and EKG. 1230 Patient ambulated to the bathroom, for black tarry stool, tolerated well, gait steady. No changes in assessment noted. 1410 Call placed to Dr Samara aCsh to notify regarding Troponin results. 1730 EGD was done at patient bedside by Dr Kamron Kraft, patient tolerated well. Protonix drip DCed per order, see MAR. Denies pain and or discomfort, resting without complaints. No changes in assessment noted.

## 2022-06-29 NOTE — PROGRESS NOTES
06/29/22     From:HOME W GIRLFRIEND AMB INDEPENDENTLY DR Baron Domínguez MANAGES COUMADIN & GETS FINGERSTICK TEST IN HIS OFFICE     Admit:KRYS, GI BLEEDING -BLACK TARRY STOOLS 1 WEEK     PMH:CAD, PAF (COUMADIN), EPILEPSY, KRYS, ANEMIA, HTN, HLP     Anticipated Discharge Disposition:RETURN HOME [facility] IF INDICATED     Patient Mobility or PT/OT ordered:N/A INDEPENDENT     Consults: GI,CHO,INTENSIVIST     Clinical: 6/28 COVID NEG   INR 6.5--1.5   HGB 8.9--6.4--7.0  88/1.69--64/1.23   CXR-COPD  6/28 RR> W SX>TO ICU    Barriers to Discharge:SR/2L  NPO--ICE CHIPS/HOLD OAC  HAD PLASMA 3UNITS/2 U RBC'S/IV FL/IV PPI     Assessments: CMI DONE

## 2022-06-29 NOTE — PROGRESS NOTES
Critical labs resulted -     Hgb - 6.4  Mag - 1.6    2 gram bolus of magnesium ordered and given. 1 unit PRBC ordered and infusing. 0030 - Pt is still hypotensive. 88/46. Pt to transfer to ICU Bed 8. Pt taken by this RN and [facility] PCA to ICU on [place]. Pt placed on 2L NC for comfort. This RN will notify family of transfer.      Electronically signed by Ghassan Scott RN on 6/29/2022 at 12:57 AM

## 2022-06-29 NOTE — CONSULTS
Continuous Infusions:   norepinephrine      pantoprozole (PROTONIX) infusion      sodium chloride      sodium chloride         Scheduled Meds:   PHENobarbital  129.6 mg Oral BID       PRN Meds:ondansetron, sodium chloride, sodium chloride        REVIEW OF SYSTEMS:  ROS: 10 organs review of system is done including general, psychological, ENT, hematological, endocrine, respiratory, cardiovascular, gastrointestinal, musculoskeletal, neurological,  allergy and Immunology is done and is otherwise negative. PHYSICAL EXAM:    Vitals:  BP (!) 113/51   Pulse 76   Temp 97.6 °F (36.4 °C) (Oral)   Resp 16   Ht 5' 5\" (1.651 m)   Wt 137 lb 2 oz (62.2 kg)   SpO2 99%   BMI 22.82 kg/m²     General: alert, cooperative, no distress  Head: normocephalic, atraumatic  Eyes:No gross abnormalities. ENT:  MMM no lesions  Neck:  supple and no masses  Chest : clear to auscultation bilaterally- no wheezes, rales or rhonchi, normal air movement, no respiratory distress  Heart[de-identified] Heart sounds are normal.  Regular rate and rhythm without murmur, gallop or rub. ABD:  symmetric, soft, epigastric tenderness, no guarding or rebound  Musculoskeletal : no cyanosis, no clubbing and no edema  Neuro:  Grossly normal  Skin: No rashes or nodules noted. Lymph node:  no cervical nodes  Urology: No Vega   Psychiatric: appropriate        Data Review  Recent Labs     06/28/22  1336 06/28/22 2229 06/29/22  0511   WBC 10.4 10.7 10.3   HGB 8.9* 6.4* 7.0*   HCT 25.7* 19.4* 20.7*    217 181      Recent Labs     06/28/22  1336 06/28/22  1336 06/28/22 2027 06/28/22 2229 06/29/22  0511   *  --   --  138 138   K 5.5*   < > 4.4 4.5 4.8   CL 97  --   --  107 109*   CO2 23  --   --  19* 20   BUN 88*  --   --  74* 64*   CREATININE 1.69*  --   --  1.35* 1.23*   GLUCOSE 115*  --   --  113* 95    < > = values in this interval not displayed. MV Settings:           ABGs: No results for input(s): PHART, UNN7LLB, PO2ART, AKG0JVZ, BEART, E0RGSXQX, GNK0NOI in the last 72 hours.   O2 Device: None (Room air)  O2 Flow Rate (L/min): 2 L/min  Lab Results   Component Value Date    LACTA 0.9 04/05/2021    LACTA 0.9 01/19/2021       Radiology  I personally reviewed imaging studies and hyperinflated, no infiltrate        Assessment, plan:   Patient is at risk due to    · Acute blood loss anemia  · Secondary to GI bleed, likely upper GI bleed, possible peptic ulcer disease  · Coagulopathy, secondary to Coumadin with active bleeding and consumption  · Acute kidney injury, improved  · Smoking  · Paroxysmal A. fib, on Coumadin     Recommendation  · Monitor hemoglobin, transfuse if less than 7  · Monitor INR  · Start Protonix drip, pending GI recommendation  · GI consult  · No anticoagulation  · SCD  · DC Levophed  · Hold BP meds for now,  · Hold Plavix and Coumadin  · Consult cardiology for recurrent ectopies  · Watch in ICU         Thank you for consultation    Electronically signed by Fransico Valadez MD, [facility],  on 6/29/2022 at 7:41 AM

## 2022-06-29 NOTE — CONSENT
Informed Consent for Blood Component Transfusion Note    I have discussed with the patient the rationale for blood component transfusion; its benefits in treating or preventing fatigue, organ damage, or death; and its risk which includes mild transfusion reactions, rare risk of blood borne infection, or more serious but rare reactions. I have discussed the alternatives to transfusion, including the risk and consequences of not receiving transfusion. The patient had an opportunity to ask questions and had agreed to proceed with transfusion of blood components.     Electronically signed by KATHY Cantrell CNP on 6/28/22 at 11:35 PM EDT

## 2022-06-29 NOTE — CONSULTS
Inpatient consult to GI  Consult performed by: Bernabe Parker MD  Consult ordered by: Constanza Gleason MD      Patient Name: Kary Ahumada Date: 2022 12:58 PM  MR #: [id number]  : 1955    Attending Physician: Constanza Gleason MD  Reason for consult: GI bleed  History Obtained From:  patient, electronic medical record, staff nurse  History of Presenting Illness:      Stefani Albarran is a 75 y.o. male on hospital day 1 with PMH of KRYS, anemia, back pain, CAD, left femur fracture, epilepsy, HLD, HTN, paroxysmal A. Fib (on coumadin) and shingles. Past surgical history includes penile prosthesis implant, laparoscopic cholecystectomy(), carotid endarterectomy. Admitted with KRYS and GI bleed. GI consulted for GI bleed. Patient reports increased nausea (no vomiting) and black tarry stools for approximately 1-2 weeks. States he presented to the ER as he felt increasingly weak, fatigued, decreased appetite and increased mid abdominal pain. He denies history of heartburn or GERD. However, does endorse longstanding esophageal dysphagia to solids, mainly rice/breads that occurs every time he eats these. He denies prior history of GI bleed, although multiple colon polyps as mentioned in previous endoscopic history. He endorses issues with constipation usually, however does not take any medications for this. He reports taking aspirin and Coumadin on a daily basis along with oral iron supplementation daily. He smokes approximately 1-1/2 packs of cigarettes per day, quit drinking approximately 2 years ago, used to drink heavily (6-8 beers per day) for many years, denies illicit drug use. Endorses rare ibuprofen use. States he used to take ibuprofen for shoulder/hip and neck pain back in 2022 but has not needed it lately.     Per nursing, he continues to have dark stools, he had an episode of hypotension and A. fib with RVR overnight for which he was transferred to the ICU and was going to start Levophed however his blood pressure recovered and he did not need it. He has had multiple PVCs overnight, multiple runs of nonsustained ventricular tachycardia this morning, cardiology recently consulted. Baseline Hgb 13 in 2021. On admit, WBCs 10.4, Hgb 8.9, MCV 91.7, platelets 295, sodium 131, potassium 5.5, BUN 88, creatinine 1.69, albumin 3.9, total bilirubin less than 0.2, alkaline phosphatase 123, ALT 14, AST 15, INR 6.2. He was given vitamin K IV in the ER. Yesterday evening, Hgb rechecked and dropped to 6.4. Magnesium 1.6. He received a unit of blood and IV magnesium. Given Protonix IV bolus and started on drip. N.p.o. status. Today, Hgb 7.0, BUN 64, creatinine 1.23, INR 1.5, magnesium 2.1. Chest x-ray from yesterday consistent with probable COPD, deformity left shoulder. Retroperitoneal ultrasound yesterday pending results. Previous endoscopic history:  Patient endorses history of prior EGD and colonoscopies, however records unavailable. He reports multiple colonoscopies due to history of multiple polyps (more than 20) and a couple of visits first colonoscopies. He also reports history of a large mass in his colon, was referred to Dr. Gayle Pepe at [facility] which was removed and noncancerous. He reports his last colonoscopy was sometime in 2021, unsure of the date, however Dr. Gayle Pepe gave him 1-2 years before repeating another.     History:      Past Medical History:   Diagnosis Date    KRYS (acute kidney injury) ([facility] [address] 6/28/2022    Anemia     Back pain     chronic    CAD (coronary artery disease)     Closed fracture of neck of left femur (HCC)     Epilepsy (HCC)     Hyperlipidemia     Hypertension     Paroxysmal A-fib ([facility] [address]     Shingles      Past Surgical History:   Procedure Laterality Date    CAROTID ENDARTERECTOMY      CHOLECYSTECTOMY, LAPAROSCOPIC N/A 04/13/2021    LAPAROSCOPIC CHOLECYSTECTOMY  WITH CHOLANGIOGRAMS performed by Magdiel Handley MD at [facility]  PENILE PROSTHESIS       Family History  History reviewed. No pertinent family history. [] Unable to obtain due to ventilated and/ or neurologic status  Social History     Socioeconomic History    Marital status:      Spouse name: Not on file    Number of children: Not on file    Years of education: Not on file    Highest education level: Not on file   Occupational History    Not on file   Tobacco Use    Smoking status: Current Every Day Smoker     Packs/day: 1.00     Years: 10.00     Pack years: 10.00     Types: Cigarettes    Smokeless tobacco: Never Used   Vaping Use    Vaping Use: Never used   Substance and Sexual Activity    Alcohol use: Not Currently    Drug use: Never    Sexual activity: Not on file   Other Topics Concern    Not on file   Social History Narrative    Not on file     Social Determinants of Health     Financial Resource Strain:     Difficulty of Paying Living Expenses: Not on file   Food Insecurity:     Worried About [employer] in the Last Year: Not on file    Rachael of Food in the Last Year: Not on file   Transportation Needs:     Lack of Transportation (Medical): Not on file    Lack of Transportation (Non-Medical):  Not on file   Physical Activity:     Days of Exercise per Week: Not on file    Minutes of Exercise per Session: Not on file   Stress:     Feeling of Stress : Not on file   Social Connections:     Frequency of Communication with Friends and Family: Not on file    Frequency of Social Gatherings with Friends and Family: Not on file    Attends Buddhist Services: Not on file    Active Member of Clubs or Organizations: Not on file    Attends Club or Organization Meetings: Not on file    Marital Status: Not on file   Intimate Partner Violence:     Fear of Current or Ex-Partner: Not on file    Emotionally Abused: Not on file    Physically Abused: Not on file    Sexually Abused: Not on file   Housing Stability:     Unable to Pay for Housing in the Last Year: Not on file    Number of Places Lived in the Last Year: Not on file    Unstable Housing in the Last Year: Not on file      [] Unable to obtain due to ventilated and/ or neurologic status  Home Medications:   Medications Prior to Admission: aspirin 81 MG EC tablet, Take 81 mg by mouth daily  atorvastatin (LIPITOR) 80 MG tablet, Take 80 mg by mouth at bedtime  ferrous sulfate (IRON 325) 325 (65 Fe) MG tablet, Take 325 mg by mouth daily  Cholecalciferol 50 MCG (2000 UT) TABS, Take 2,000 Units by mouth daily  [DISCONTINUED] lisinopril (PRINIVIL;ZESTRIL) 10 MG tablet, Take 10 mg by mouth daily  dicyclomine (BENTYL) 10 MG capsule, Take 1 capsule by mouth every 6 hours as needed (cramps) (Patient not taking: Reported on 6/28/2022)  clopidogrel (PLAVIX) 75 MG tablet, Take 75 mg by mouth daily (Patient not taking: Reported on 6/28/2022)  lisinopril (PRINIVIL;ZESTRIL) 10 MG tablet, Take by mouth daily  PHENobarbital (LUMINAL) 32.4 MG tablet, Take 129.6 mg by mouth 2 times daily  ibuprofen (ADVIL;MOTRIN) 800 MG tablet, Take 1 tablet by mouth every 8 hours as needed for Pain or Fever (Patient not taking: Reported on 6/28/2022)  Current Hospital Medications:   Scheduled Meds:   PHENobarbital  129.6 mg Oral BID     Continuous Infusions:   norepinephrine      pantoprozole (PROTONIX) infusion 8 mg/hr (06/29/22 0821)    sodium chloride      sodium chloride       PRN Meds:.ondansetron, sodium chloride, sodium chloride   norepinephrine      pantoprozole (PROTONIX) infusion 8 mg/hr (06/29/22 0821)    sodium chloride      sodium chloride        Allergies: Allergies   Allergen Reactions    Penicillins Nausea And Vomiting     Other reaction(s): Myalgia      Review of Systems:    [x] CV, Resp, Neuro, , and all other systems reviewed and negative other than listed in HPI.     Objective Findings:     Vitals:   Vitals:    06/29/22 0402 06/29/22 0500 06/29/22 0600 06/29/22 0700   BP: (!) 80/45 (!) gastritis, PUD, small bowel AVM, right colonic AVM. Plan:   - Continue Protonix IV 8 mg/hr for 72 hrs  - Keep patient NPO, okay for ice chips  - Upper endoscopy +/- intervention to be performed pending stabilization/cardiology evaluation  - Monitor H/H and transfuse accordingly  - Continue IVF for hypotension  - Check H pylori Ab, eradicate if positive  - Avoid NSAID's  - Please obtain medical records from [place] [address] including last colonoscopies with Dr. Yakov Mauricio    Comments: Thank you for allowing us to participate in the care of this patient. Will continue to follow. Please call if questions or concerns arise. Electronically signed by Justine Valverde MD on 6/29/2022 at 9:24 AM    Please note this report has been partially produced using speech recognition software and may cause contain errors related to that system including grammar, punctuation and spelling as well as words and phrases that may seem inappropriate. If there are questions or concerns please feel free to contact me to clarify.

## 2022-06-30 LAB
ALBUMIN SERPL-MCNC: 3.2 G/DL (ref 3.5–4.6)
ALP BLD-CCNC: 101 U/L (ref 35–104)
ALT SERPL-CCNC: 24 U/L (ref 0–41)
ANION GAP SERPL CALCULATED.3IONS-SCNC: 11 MEQ/L (ref 9–15)
ANION GAP SERPL CALCULATED.3IONS-SCNC: 9 MEQ/L (ref 9–15)
AST SERPL-CCNC: 98 U/L (ref 0–40)
BASOPHILS ABSOLUTE: 0 K/UL (ref 0–0.2)
BASOPHILS RELATIVE PERCENT: 0.6 %
BILIRUB SERPL-MCNC: <0.2 MG/DL (ref 0.2–0.7)
BUN BLDV-MCNC: 17 MG/DL (ref 8–23)
BUN BLDV-MCNC: 32 MG/DL (ref 8–23)
CALCIUM SERPL-MCNC: 7.8 MG/DL (ref 8.5–9.9)
CALCIUM SERPL-MCNC: 8.3 MG/DL (ref 8.5–9.9)
CHLORIDE BLD-SCNC: 107 MEQ/L (ref 95–107)
CHLORIDE BLD-SCNC: 108 MEQ/L (ref 95–107)
CO2: 19 MEQ/L (ref 20–31)
CO2: 21 MEQ/L (ref 20–31)
CREAT SERPL-MCNC: 0.98 MG/DL (ref 0.7–1.2)
CREAT SERPL-MCNC: 1.01 MG/DL (ref 0.7–1.2)
EOSINOPHILS ABSOLUTE: 0.3 K/UL (ref 0–0.7)
EOSINOPHILS RELATIVE PERCENT: 3.8 %
GFR AFRICAN AMERICAN: >60
GFR AFRICAN AMERICAN: >60
GFR NON-AFRICAN AMERICAN: >60
GFR NON-AFRICAN AMERICAN: >60
GLOBULIN: 1.8 G/DL (ref 2.3–3.5)
GLUCOSE BLD-MCNC: 102 MG/DL (ref 70–99)
GLUCOSE BLD-MCNC: 82 MG/DL (ref 70–99)
HCT VFR BLD CALC: 19.6 % (ref 42–52)
HCT VFR BLD CALC: 21.3 % (ref 42–52)
HCT VFR BLD CALC: 26.9 % (ref 42–52)
HEMOGLOBIN: 6.6 G/DL (ref 14–18)
HEMOGLOBIN: 7.2 G/DL (ref 14–18)
HEMOGLOBIN: 9.1 G/DL (ref 14–18)
INR BLD: 1.1
LYMPHOCYTES ABSOLUTE: 1.4 K/UL (ref 1–4.8)
LYMPHOCYTES RELATIVE PERCENT: 19.9 %
MAGNESIUM: 1.9 MG/DL (ref 1.7–2.4)
MCH RBC QN AUTO: 30.8 PG (ref 27–31.3)
MCH RBC QN AUTO: 31 PG (ref 27–31.3)
MCHC RBC AUTO-ENTMCNC: 33.8 % (ref 33–37)
MCHC RBC AUTO-ENTMCNC: 33.9 % (ref 33–37)
MCV RBC AUTO: 90.8 FL (ref 80–100)
MCV RBC AUTO: 91.7 FL (ref 80–100)
MONOCYTES ABSOLUTE: 0.5 K/UL (ref 0.2–0.8)
MONOCYTES RELATIVE PERCENT: 7.7 %
NEUTROPHILS ABSOLUTE: 4.8 K/UL (ref 1.4–6.5)
NEUTROPHILS RELATIVE PERCENT: 68 %
PDW BLD-RTO: 13.5 % (ref 11.5–14.5)
PDW BLD-RTO: 13.7 % (ref 11.5–14.5)
PLATELET # BLD: 202 K/UL (ref 130–400)
PLATELET # BLD: 216 K/UL (ref 130–400)
POTASSIUM SERPL-SCNC: 4.3 MEQ/L (ref 3.4–4.9)
POTASSIUM SERPL-SCNC: 4.3 MEQ/L (ref 3.4–4.9)
PROTHROMBIN TIME: 14 SEC (ref 12.3–14.9)
RBC # BLD: 2.34 M/UL (ref 4.7–6.1)
RBC # BLD: 2.93 M/UL (ref 4.7–6.1)
SODIUM BLD-SCNC: 137 MEQ/L (ref 135–144)
SODIUM BLD-SCNC: 138 MEQ/L (ref 135–144)
TOTAL CK: 1050 U/L (ref 0–190)
TOTAL PROTEIN: 5 G/DL (ref 6.3–8)
TROPONIN: 2 NG/ML (ref 0–0.01)
WBC # BLD: 7 K/UL (ref 4.8–10.8)
WBC # BLD: 8.3 K/UL (ref 4.8–10.8)

## 2022-06-30 PROCEDURE — 2580000003 HC RX 258: Performed by: NURSE PRACTITIONER

## 2022-06-30 PROCEDURE — 93325 DOPPLER ECHO COLOR FLOW MAPG: CPT

## 2022-06-30 PROCEDURE — 6370000000 HC RX 637 (ALT 250 FOR IP): Performed by: NURSE PRACTITIONER

## 2022-06-30 PROCEDURE — 85018 HEMOGLOBIN: CPT

## 2022-06-30 PROCEDURE — 85014 HEMATOCRIT: CPT

## 2022-06-30 PROCEDURE — 99233 SBSQ HOSP IP/OBS HIGH 50: CPT | Performed by: INTERNAL MEDICINE

## 2022-06-30 PROCEDURE — 6370000000 HC RX 637 (ALT 250 FOR IP): Performed by: INTERNAL MEDICINE

## 2022-06-30 PROCEDURE — 84484 ASSAY OF TROPONIN QUANT: CPT

## 2022-06-30 PROCEDURE — 85025 COMPLETE CBC W/AUTO DIFF WBC: CPT

## 2022-06-30 PROCEDURE — 51798 US URINE CAPACITY MEASURE: CPT

## 2022-06-30 PROCEDURE — 2580000003 HC RX 258

## 2022-06-30 PROCEDURE — 85610 PROTHROMBIN TIME: CPT

## 2022-06-30 PROCEDURE — 93320 DOPPLER ECHO COMPLETE: CPT

## 2022-06-30 PROCEDURE — 83735 ASSAY OF MAGNESIUM: CPT

## 2022-06-30 PROCEDURE — 51701 INSERT BLADDER CATHETER: CPT

## 2022-06-30 PROCEDURE — 97162 PT EVAL MOD COMPLEX 30 MIN: CPT

## 2022-06-30 PROCEDURE — 2700000000 HC OXYGEN THERAPY PER DAY

## 2022-06-30 PROCEDURE — 97166 OT EVAL MOD COMPLEX 45 MIN: CPT

## 2022-06-30 PROCEDURE — 2580000003 HC RX 258: Performed by: INTERNAL MEDICINE

## 2022-06-30 PROCEDURE — 2000000000 HC ICU R&B

## 2022-06-30 PROCEDURE — 36430 TRANSFUSION BLD/BLD COMPNT: CPT

## 2022-06-30 PROCEDURE — 99233 SBSQ HOSP IP/OBS HIGH 50: CPT | Performed by: NURSE PRACTITIONER

## 2022-06-30 PROCEDURE — 80053 COMPREHEN METABOLIC PANEL: CPT

## 2022-06-30 PROCEDURE — 85027 COMPLETE CBC AUTOMATED: CPT

## 2022-06-30 PROCEDURE — 36415 COLL VENOUS BLD VENIPUNCTURE: CPT

## 2022-06-30 PROCEDURE — 82550 ASSAY OF CK (CPK): CPT

## 2022-06-30 PROCEDURE — 93308 TTE F-UP OR LMTD: CPT

## 2022-06-30 RX ORDER — SODIUM CHLORIDE 0.9 % (FLUSH) 0.9 %
5-40 SYRINGE (ML) INJECTION PRN
Status: DISCONTINUED | OUTPATIENT
Start: 2022-06-30 | End: 2022-07-03 | Stop reason: HOSPADM

## 2022-06-30 RX ORDER — ASPIRIN 81 MG/1
81 TABLET ORAL ONCE
Status: COMPLETED | OUTPATIENT
Start: 2022-07-01 | End: 2022-06-30

## 2022-06-30 RX ORDER — SODIUM CHLORIDE 0.9 % (FLUSH) 0.9 %
5-40 SYRINGE (ML) INJECTION EVERY 12 HOURS SCHEDULED
Status: DISCONTINUED | OUTPATIENT
Start: 2022-06-30 | End: 2022-07-03 | Stop reason: HOSPADM

## 2022-06-30 RX ORDER — SODIUM CHLORIDE 9 MG/ML
INJECTION, SOLUTION INTRAVENOUS PRN
Status: DISCONTINUED | OUTPATIENT
Start: 2022-06-30 | End: 2022-07-03 | Stop reason: HOSPADM

## 2022-06-30 RX ORDER — TAMSULOSIN HYDROCHLORIDE 0.4 MG/1
0.4 CAPSULE ORAL DAILY
Status: DISCONTINUED | OUTPATIENT
Start: 2022-06-30 | End: 2022-07-03 | Stop reason: HOSPADM

## 2022-06-30 RX ORDER — NITROGLYCERIN 0.4 MG/1
0.4 TABLET SUBLINGUAL EVERY 5 MIN PRN
Status: DISCONTINUED | OUTPATIENT
Start: 2022-06-30 | End: 2022-07-03 | Stop reason: HOSPADM

## 2022-06-30 RX ORDER — SODIUM CHLORIDE 9 MG/ML
INJECTION, SOLUTION INTRAVENOUS
Status: COMPLETED
Start: 2022-06-30 | End: 2022-07-01

## 2022-06-30 RX ORDER — PEG-3350, SODIUM SULFATE, SODIUM CHLORIDE, POTASSIUM CHLORIDE, SODIUM ASCORBATE AND ASCORBIC ACID 7.5-2.691G
100 KIT ORAL ONCE
Status: DISCONTINUED | OUTPATIENT
Start: 2022-06-30 | End: 2022-06-30

## 2022-06-30 RX ORDER — LANOLIN ALCOHOL/MO/W.PET/CERES
400 CREAM (GRAM) TOPICAL 2 TIMES DAILY
Status: DISCONTINUED | OUTPATIENT
Start: 2022-06-30 | End: 2022-07-03 | Stop reason: HOSPADM

## 2022-06-30 RX ORDER — DIPHENHYDRAMINE HCL 25 MG
50 TABLET ORAL ONCE
Status: DISCONTINUED | OUTPATIENT
Start: 2022-06-30 | End: 2022-07-03 | Stop reason: HOSPADM

## 2022-06-30 RX ORDER — PEG-3350, SODIUM SULFATE, SODIUM CHLORIDE, POTASSIUM CHLORIDE, SODIUM ASCORBATE AND ASCORBIC ACID 7.5-2.691G
100 KIT ORAL ONCE
Status: COMPLETED | OUTPATIENT
Start: 2022-06-30 | End: 2022-06-30

## 2022-06-30 RX ORDER — PREDNISONE 50 MG/1
50 TABLET ORAL ONCE
Status: DISCONTINUED | OUTPATIENT
Start: 2022-06-30 | End: 2022-07-03 | Stop reason: HOSPADM

## 2022-06-30 RX ORDER — 0.9 % SODIUM CHLORIDE 0.9 %
1000 INTRAVENOUS SOLUTION INTRAVENOUS ONCE
Status: COMPLETED | OUTPATIENT
Start: 2022-06-30 | End: 2022-06-30

## 2022-06-30 RX ORDER — SODIUM CHLORIDE, SODIUM LACTATE, POTASSIUM CHLORIDE, CALCIUM CHLORIDE 600; 310; 30; 20 MG/100ML; MG/100ML; MG/100ML; MG/100ML
INJECTION, SOLUTION INTRAVENOUS
Status: COMPLETED
Start: 2022-06-30 | End: 2022-06-30

## 2022-06-30 RX ORDER — 0.9 % SODIUM CHLORIDE 0.9 %
1000 INTRAVENOUS SOLUTION INTRAVENOUS ONCE
Status: COMPLETED | OUTPATIENT
Start: 2022-06-30 | End: 2022-07-01

## 2022-06-30 RX ORDER — ONDANSETRON 2 MG/ML
4 INJECTION INTRAMUSCULAR; INTRAVENOUS EVERY 6 HOURS PRN
Status: DISCONTINUED | OUTPATIENT
Start: 2022-06-30 | End: 2022-07-03 | Stop reason: HOSPADM

## 2022-06-30 RX ADMIN — SODIUM CHLORIDE, PRESERVATIVE FREE 10 ML: 5 INJECTION INTRAVENOUS at 20:41

## 2022-06-30 RX ADMIN — SODIUM CHLORIDE, POTASSIUM CHLORIDE, SODIUM LACTATE AND CALCIUM CHLORIDE 500 ML: 600; 310; 30; 20 INJECTION, SOLUTION INTRAVENOUS at 14:04

## 2022-06-30 RX ADMIN — AMIODARONE HYDROCHLORIDE 200 MG: 200 TABLET ORAL at 08:09

## 2022-06-30 RX ADMIN — POLYETHYLENE GLYCOL 3350, SODIUM SULFATE, SODIUM CHLORIDE, POTASSIUM CHLORIDE, ASCORBIC ACID, SODIUM ASCORBATE 100 G: KIT at 18:31

## 2022-06-30 RX ADMIN — PANTOPRAZOLE SODIUM 40 MG: 40 TABLET, DELAYED RELEASE ORAL at 16:40

## 2022-06-30 RX ADMIN — Medication 400 MG: at 08:09

## 2022-06-30 RX ADMIN — PHENOBARBITAL 129.6 MG: 32.4 TABLET ORAL at 20:37

## 2022-06-30 RX ADMIN — SODIUM CHLORIDE 1000 ML: 9 INJECTION, SOLUTION INTRAVENOUS at 01:44

## 2022-06-30 RX ADMIN — METOPROLOL TARTRATE 12.5 MG: 25 TABLET, FILM COATED ORAL at 20:38

## 2022-06-30 RX ADMIN — PANTOPRAZOLE SODIUM 40 MG: 40 TABLET, DELAYED RELEASE ORAL at 05:30

## 2022-06-30 RX ADMIN — AMIODARONE HYDROCHLORIDE 200 MG: 200 TABLET ORAL at 20:34

## 2022-06-30 RX ADMIN — SODIUM CHLORIDE 1000 ML: 9 INJECTION, SOLUTION INTRAVENOUS at 23:05

## 2022-06-30 RX ADMIN — TAMSULOSIN HYDROCHLORIDE 0.4 MG: 0.4 CAPSULE ORAL at 08:09

## 2022-06-30 RX ADMIN — ASPIRIN 81 MG: 81 TABLET, COATED ORAL at 23:33

## 2022-06-30 RX ADMIN — PHENOBARBITAL 129.6 MG: 32.4 TABLET ORAL at 08:09

## 2022-06-30 RX ADMIN — BISACODYL 10 MG: 5 TABLET, COATED ORAL at 14:01

## 2022-06-30 RX ADMIN — METOPROLOL TARTRATE 25 MG: 25 TABLET, FILM COATED ORAL at 08:09

## 2022-06-30 RX ADMIN — Medication 400 MG: at 20:34

## 2022-06-30 ASSESSMENT — ENCOUNTER SYMPTOMS
DIARRHEA: 0
COUGH: 0
VOMITING: 0
NAUSEA: 0
SHORTNESS OF BREATH: 0

## 2022-06-30 ASSESSMENT — PAIN SCALES - GENERAL
PAINLEVEL_OUTOF10: 0

## 2022-06-30 NOTE — PROGRESS NOTES
Progress Note  Date:2022       Room:[place] [address]  Patient Name:Ronald Painting     YOB: 1955     Age:67 y.o. Subjective    Subjective:  Symptoms:  He reports malaise and weakness. No shortness of breath, cough, chest pain, headache, chest pressure, anorexia, diarrhea or anxiety. Diet:  No nausea or vomiting. Review of Systems   Respiratory: Negative for cough and shortness of breath. Cardiovascular: Negative for chest pain. Gastrointestinal: Negative for anorexia, diarrhea, nausea and vomiting. Neurological: Positive for weakness. Objective         Vitals Last 24 Hours:  TEMPERATURE:  Temp  Av.9 °F (36.6 °C)  Min: 97.5 °F (36.4 °C)  Max: 98.2 °F (36.8 °C)  RESPIRATIONS RANGE: Resp  Av.9  Min: 13  Max: 28  PULSE OXIMETRY RANGE: SpO2  Av.4 %  Min: 91 %  Max: 100 %  PULSE RANGE: Pulse  Av.4  Min: 60  Max: 87  BLOOD PRESSURE RANGE: Systolic (53ECD), GBI:140 , Min:73 , LMF:370   ; Diastolic (26DPF), ZRZ:03, Min:25, Max:127    I/O (24Hr): Intake/Output Summary (Last 24 hours) at 2022 1020  Last data filed at 2022 0555  Gross per 24 hour   Intake 3299.06 ml   Output 2457 ml   Net 842.06 ml     Objective:  General Appearance:  Comfortable, well-appearing and in no acute distress. Vital signs: (most recent): Blood pressure (!) 105/46, pulse 76, temperature 97.8 °F (36.6 °C), temperature source Oral, resp. rate 14, height 5' 5\" (1.651 m), weight 137 lb 12.6 oz (62.5 kg), SpO2 100 %. HEENT: Normal HEENT exam.    Lungs:  Breath sounds clear to auscultation. Heart: S1 normal and S2 normal.    Abdomen: Abdomen is soft. Bowel sounds are normal.   There is no epigastric area or suprapubic area tenderness. Extremities: Normal range of motion. Pulses: Distal pulses are intact. Neurological: Patient is alert. Pupils:  Pupils are equal, round, and reactive to light. Skin:  Warm.       Labs/Imaging/Diagnostics    Labs:  CBC:  Recent Labs     06/28/22  2229 06/28/22  2229 06/29/22  0511 06/29/22  0511 06/29/22  1324 06/29/22  2242 06/30/22  0435   WBC 10.7  --  10.3  --   --   --  7.0   RBC 2.07*  --  2.25*  --   --   --  2.34*   HGB 6.4*   < > 7.0*   < > 7.0* 7.0* 7.2*   HCT 19.4*   < > 20.7*   < > 20.6* 21.2* 21.3*   MCV 93.6  --  92.2  --   --   --  90.8   RDW 13.2  --  13.5  --   --   --  13.7     --  181  --   --   --  202    < > = values in this interval not displayed. CHEMISTRIES:  Recent Labs     06/28/22 2229 06/29/22  0511 06/29/22  0930 06/30/22  0435    138  --  137   K 4.5 4.8  --  4.3    109*  --  107   CO2 19* 20  --  19*   BUN 74* 64*  --  32*   CREATININE 1.35* 1.23*  --  0.98   GLUCOSE 113* 95  --  82   MG 1.6*  --  2.1 1.9     PT/INR:  Recent Labs     06/28/22  1444 06/29/22  0511 06/30/22  0435   PROTIME 52.6* 17.9* 14.0   INR 6.2* 1.5 1.1     APTT:  Recent Labs     06/28/22  1444   APTT 46.2*     LIVER PROFILE:  Recent Labs     06/28/22  1336 06/29/22  0511 06/30/22  0435   AST 15 45* 98*   ALT 14 16 24   BILITOT <0.2 0.3 <0.2   ALKPHOS 123* 87 101       Imaging Last 24 Hours:  XR CHEST (2 VW)    Result Date: 6/28/2022  EXAMINATION:  CHEST. CLINICAL HISTORY:  COUGH. COMPARISONS:  4/8/2017. TECHNIQUE:  PA and lateral. FINDINGS:  Heart size and contour are within normal limits. Pulmonary vascularity is normal. No infiltrate or effusion is seen. There is no evidence of a mass or adenopathy. Lungs appear somewhat hyperinflated. There is deformity of the left shoulder. PROBABLE COPD. DEFORMITY LEFT SHOULDER. uHma Fraire     Assessment//Plan           Hospital Problems           Last Modified POA    * (Principal) KRYS (acute kidney injury) ([facility] [address] 6/28/2022 Yes    Gastrointestinal hemorrhage 6/29/2022 Yes      1) KRYS  2) acute post hemorrhagic anemia  3 afib with RVR  4) hyponatremia and hyperkalemia  5) ho seizures  6) diarhea  7) elevated trop non cardiac causes  8) Rhabdo  9) Vtach  Assessment & Plan    6/29: Patient was transferred to ICU for hypotension and A. fib with RVR. Follow cardiology recommendation, patient is not on pressors currently. Follow-up GI about endoscopy. Status post unit of PRBC and fresh frozen plasma. For coagulopathy secondary to extrinsic factors, transfuse to keep hemoglobin over 7. No other complaints. Hyponatremia and hyperkalemia resolved  6/30: Still waiting for report of endoscopy from [facility], spoke with nursing and/. Patient went for possible cardiac cath tomorrow. Advance diet if is okay with GI. Status post EGD did not show active bleed. Patient had to straight cath due to unable to urinate due to most likely anesthesia yesterday during EGD will monitor. Bladder scan today spoke with nursing. Started on flomax.  diarhea resolved, KRYS resolved,   Electronically signed by Carlton Cox MD on 6/29/22 at 11:32 AM EDT

## 2022-06-30 NOTE — PROGRESS NOTES
19:00 Bedside handoff report received from JACY Hernandez. 100ml of clear yellow urine emptied from urinal.     20:45-21:00 Assessment completed (see flowsheets). Pt A&Ox4, able to make needs and wants known. Pleasant and cooperative with staff. Pt took PO medications whole with thin liquids w/o difficulty. Pt requested to use bathroom. Pt ambulated to and from bed/toilet with slow steady gait and stand by assist. Pt did not void or have BM at this time. No s/s of active bleeding noted. Pt c/o lower ABD pain being 5/10, non-pharmaceutical interventions of distraction and repositioning not effective in alleviating pain. Pt stated \"I only been able to pee that little bit since that procedure was done. \" Pt denies dysuria and suprapubic tenderness. Pt stated \"I know when I have to go pee, I just cannot get started and it doesn't all come out. \" Bladder scan completed. Total of 902ml of urine noted. Secure message sent to DonnellyJOSSE Glass NP waiting for response. Safety measures in place. 21:20 New order obtained for straight cath x1.     21:45-22:00 Straight cath completed, pt tolerated well. Total of 850ml of clear yellow urine obtained. Post straight cath bladder scan showed 389ml of urine remaining in bladder. Pt current pain level is 2 out of 10. Safety measures in place. 01:00-01:35 Reassessment completed, no changes in pt assessment noted (see flowsheets). Pt noted to be hypotensive. Pt A&Ox4, denies any pain or discomfort. BP cuff repositioned and BP recycled, BP 95/31 MAP 51. Ricky Glass NP notified new order obtained for 1L NS bolus x1. No s/s of bleeding or distress noted. Safety measures in place. 02:00 Pt void 100ml of clear yellow urine via urinal.     03:50 Pt requested to use bathroom. Pt ambulated to and from bed/toilet with slow steady gait and stand by assist. Pt had medium loose stool brown/black in color.      04:10-05:00 Pt reassessed, no changes in assessment noted (see flowsheets). Pt requested to use bathroom. Pt ambulated to and from bed/toilet x2  with slow steady gait and stand by assist. Each time pt had small  loose stool brown/black in color. Pt voided 50ml of clear yellow urine via urinal. Bladder scan completed, 765ml of urine noted. Ricky Glass NP notified, new order obtained for straight cath x1. Pt denies pain or discomfort. Safety measures in place. 05:50-06:00 Straight cath completed, pt tolerated well. 750ml of clear yellow urine obtained. Post straight cath bladder scan completed 218ml of urine noted. Safety measures in place. 06:40  rounded on pt, update provided. New order obtained for Flomax 0.4mg PO QD. Pt updated. 07:00 Bedside handoff report given to on coming RN. Safety measures in place.

## 2022-06-30 NOTE — PROGRESS NOTES
Pt bladder scanned, shows 624ml. Vega placed per Dr Rasheed Rodas order. Immediate output of 600ml clear urine.

## 2022-06-30 NOTE — PROGRESS NOTES
10. 7  --  10.3  --   --   --  7.0   HGB 6.4*   < > 7.0*   < > 7.0* 7.0* 7.2*   HCT 19.4*   < > 20.7*   < > 20.6* 21.2* 21.3*   MCV 93.6  --  92.2  --   --   --  90.8     --  181  --   --   --  202    < > = values in this interval not displayed. Recent Labs     06/28/22  2229 06/29/22  0511 06/30/22  0435    138 137   K 4.5 4.8 4.3    109* 107   CO2 19* 20 19*   BUN 74* 64* 32*   CREATININE 1.35* 1.23* 0.98     Recent Labs     06/28/22  1336 06/29/22  0511 06/30/22  0435   AST 15 45* 98*   ALT 14 16 24   BILITOT <0.2 0.3 <0.2   ALKPHOS 123* 87 101     Recent Labs     06/28/22  1336   LIPASE 18     Recent Labs     06/28/22  1444 06/29/22  0511 06/30/22  0435   PROTIME 52.6* 17.9* 14.0   INR 6.2* 1.5 1.1       Radiology Review:      Chest x-ray from yesterday consistent with probable COPD, deformity left shoulder. Retroperitoneal ultrasound 6/28/2022: Symmetric kidneys. No ultrasound signs of hydronephrosis. Bladder wall thickening/trabeculation and small bladder wall diverticulum. Incomplete pelvic assessment in this patient who cannot tolerate bladder distention. ASSESSMENT:  75 y.o. male with PMH of KRYS, anemia, back pain, CAD, left femur fracture, epilepsy, HLD, HTN, paroxysmal A. Fib (on coumadin and baby ASA) and shingles. Admitted with KRYS and GI bleed. Patient with 1-2-week onset of nausea, abdominal pain, black tarry stools. No prior history of GERD. Endorses longstanding history of esophageal dysphagia to solids, mainly rice/breads. No prior history of GI bleed. He underwent EGD/colonoscopy on 1/22/2020 w/Dr. Maria Isabel Trotter revealing 1-2 cm hiatal hernia, pyloric edema, along with some gastric body/duodenal bulb inflammation, no active bleeding identified. He had multiple colon polyps in the ascending, descending, sigmoid and rectum. Ascending colon tumor measuring approximately 3-4 cm at that time. He states this is when he was referred to Dr. Osman Higginbotham.   These records are not available yet. He reports last colonoscopy with Dr. Juan Jose Atwood sometime in 2021 and was told to repeat colonoscopy in a couple of years. +1.5ppd cigarette smoker. History of alcohol use in the past, quit a couple of years ago. Prior use of NSAIDs earlier this year, rare use currently. On admit, Hgb 8.9, MCV 91.7 (baseline Hgb 13 and 2021), BUN 88, creatinine 1.69, and INR 6.2. He received vitamin K IV in ER. He had multiple PVCs and multiple episodes of nonsustained V. tach this yesterday, cardiology consulted and he was placed on amiodarone drip.     Clinical history consistent with acute blood loss anemia. S/p EGD 6/29/2022 with no evidence for bleed. Small bowel versus right colonic source in differential with patient's known history of multiple colonic polyps. PLAN :  - Continue Protonix 40 mg by mouth twice daily (changed to oral after EGD)  - Monitor H/H and transfuse accordingly  - Continue IVF for hypotension  - Avoid NSAID's  - Please obtain medical records from [facility] including last colonoscopies with Dr. Juan Jose Atwood  -Plan for colonoscopy tomorrow morning  -Give Dulcolax 10 mg by mouth now  -Start MoviPrep, give full dose today starting at 1600, finish by midnight. Thank you for allowing me to participate in the care of your patient. Please feel free to contact me with any concerns.     KATHY Higginbotham - CNP

## 2022-06-30 NOTE — PROGRESS NOTES
[facility] [place] Note      Patient: Mar Vizcaino    Unit/Bed: IC08/IC08-01  YOB: 1955  MRN: [id number]  Admit Date:  6/28/2022  Hospital Day: 2    Rounding Date: 6/30/2022    Rounding Cardiologist:  SHIRLEY Briseno MD    PRIMARY Cardiologist:  Dae Briseno    Subjective Complaint:   Denies any chest pain with exertion or at rest, palpitations, syncope, or edema. , feels better. Physical Examination:     BP (!) 105/46   Pulse 76   Temp 97.8 °F (36.6 °C) (Oral)   Resp 14   Ht 5' 5\" (1.651 m)   Wt 137 lb 12.6 oz (62.5 kg)   SpO2 100%   BMI 22.93 kg/m²       Intake/Output Summary (Last 24 hours) at 6/30/2022 1025  Last data filed at 6/30/2022 0555  Gross per 24 hour   Intake 3299.06 ml   Output 2457 ml   Net 842.06 ml                  Mar Vizcaino examined at bedside in in no apparent distress and cooperative. Focused exam reveals:     Cardiac: Heart regular rate and rhythm     Lungs:  clear to auscultation bilaterally- no wheezes, rales or rhonchi, normal air movement, no respiratory distress    Extremities:   negative    Telemetry:      normal sinus  and still with short bursts of VT         LABS:   CBC:   Recent Labs     06/28/22 2229 06/28/22 2229 06/29/22  0511 06/29/22  0511 06/29/22  1324 06/29/22  2242 06/30/22  0435   WBC 10.7  --  10.3  --   --   --  7.0   HGB 6.4*   < > 7.0*   < > 7.0* 7.0* 7.2*     --  181  --   --   --  202    < > = values in this interval not displayed. BMP:    Recent Labs     06/28/22 2229 06/29/22  0511 06/30/22  0435    138 137   K 4.5 4.8 4.3    109* 107   CO2 19* 20 19*   BUN 74* 64* 32*   CREATININE 1.35* 1.23* 0.98   GLUCOSE 113* 95 82              Troponin: No results for input(s): TROPONINT in the last 72 hours. BNP: No results for input(s): PROBNP in the last 72 hours.    INR:   Recent Labs     06/28/22  1444 06/29/22  0511 06/30/22  0435   INR 6.2* 1.5 1.1      Mg:   Recent Labs     06/30/22  0435   MG 1.9

## 2022-06-30 NOTE — PROGRESS NOTES
[facility]AL[facility] - ACUTE     NAME: Norman Sharif  :  (20 y.o.)  MRN: [id number]  CODE STATUS: Full Code  Room: [place] [address]    Date of Service: 2022    Patient Diagnosis(es): KRYS (acute kidney injury) [facility]) [N17.9]  Gastrointestinal hemorrhage, unspecified gastrointestinal hemorrhage type [K92.2]   Patient Active Problem List    Diagnosis Date Noted    Gastrointestinal hemorrhage     KRYS [facility]acute kidney injury) ([facility] 2022    Acute cholecystitis with chronic cholecystitis 04/15/2021    Chronic cholecystitis with calculus 2021        Past Medical History:   Diagnosis Date    KRYS (acute kidney injury) ([facility] 2022    Anemia     Back pain     chronic    CAD (coronary artery disease)     Closed fracture of neck of left femur (HCC)     Epilepsy (HCC)     Hyperlipidemia     Hypertension     Paroxysmal A-fib ([facility]     Shingles      Past Surgical History:   Procedure Laterality Date    CAROTID ENDARTERECTOMY      CHOLECYSTECTOMY, LAPAROSCOPIC N/A 2021    LAPAROSCOPIC CHOLECYSTECTOMY  WITH CHOLANGIOGRAMS performed by Rosa Nguyen MD at [facility]. [facility] ENDOSCOPY N/A 2022    EGD DIAGNOSTIC ONLY performed by Yonny Guidry MD at [facility]        Restrictions  Restrictions/Precautions: Up as Tolerated     Safety Devices: Safety Devices  Type of Devices: All fall risk precautions in place; Bed alarm in place;Call light within reach; Left in bed - bed alarm on on therapist arrival    Patient's date of birth confirmed: Yes    General:  Chart Reviewed: Yes  Patient assessed for rehabilitation services?: Yes    Subjective  Subjective: \"I can try it\"       Pain at start of treatment: Yes: 4/10    Pain at end of treatment: Yes: 4/10    Location: Abdomen  Nursing notified: Declined  RN:   Intervention: None    Prior Level of Function:  Social/Functional History  Lives With: Significant other  Type of Home: House  Home Layout: Two level,Bed/Bath upstairs (11-13 steps to second floor with rail)  Home Access: Stairs to enter with rails  Entrance Stairs - Number of Steps: 4  Bathroom Shower/Tub: Tub/Shower unit  Bathroom Equipment: Shower chair  Home Equipment: Jodeen Risen, rolling,Cane  Has the patient had two or more falls in the past year or any fall with injury in the past year?:  (Broke hip in 2021)  ADL [facility]: [facility]: Independent  Ambulation Assistance: Independent  Transfer Assistance: Independent  Active : No  Patient's  Info: SO    OBJECTIVE:     Orientation Status:  Orientation  Overall Orientation Status: Within Functional Limits (Except exact date)    Observation:  Observation/Palpation  Posture: Fair  Observation: Pt mildly lethargic but arousable, pleasant, no acute distress, agreeable to OT eval, 97% on RA    Cognition Status:  Cognition  Overall Cognitive Status: [facility]  Cognition Comment: Increased time    Perception Status:  Perception  Overall Perceptual Status: [facility]    Vision and Hearing Status:  Vision  Vision Exceptions: Wears glasses at all times  Hearing  Hearing: Within functional limits   Vision - Basic Assessment  Prior Vision: Wears glasses all the time  Visual History: Macular degeneration  Patient Visual Report: No visual complaint reported. Visual Field Cut: No  Oculo Motor Control:  WNL    GROSS ASSESSMENT AROM/PROM:  AROM: Generally decreased, functional  PROM: Generally decreased, functional    ROM:   LUE AROM (degrees)  LUE AROM : WFL  Left Hand AROM (degrees)  Left Hand AROM: WFL  RUE AROM (degrees)  RUE AROM : WFL  Right Hand AROM (degrees)  Right Hand AROM: WFL    UE STRENGTH:       UE COORDINATION:  Coordination: Generally decreased, functional    UE TONE:  Tone: Normal    UE SENSATION:  Sensation: Intact    Hand Dominance:  Hand Dominance  Hand Dominance: Right    ADL Status:  ADL  Feeding: Independent  Grooming: Setup  UE Bathing: Setup  LE Bathing: Stand by assistance  UE Dressing: Stand by assistance  LE Dressing: Stand by assistance  Toileting: Stand by assistance  Additional Comments: Simulated ADLs as above. Limited d/t SOB and mild balance deficits. Toilet Transfers  Toilet Transfer: Unable to assess  Toilet Transfers Comments: Declined need    Functional Mobility:  Patient ambulated household distance in hallway with No device at Supervision level. Mild balance losses, but no LOB requiring assist.     Bed Mobility  Bed mobility  Supine to Sit: Supervision  Sit to Supine: Supervision    Seated and Standing Balance:  Balance  Sitting: Intact  Standing: Impaired  Standing - Static: Good  Standing - Dynamic: Fair    Functional Endurance:  Activity Tolerance  Activity Tolerance: Patient Tolerated treatment well    D/C Recommendations:  OT D/C RECOMMENDATIONS  REQUIRES OT FOLLOW-UP: Yes    Equipment Recommendations:  OT Equipment Recommendations  Other: Continue to assess    OT Education:   Patient Education  Education Given To: Patient  Education Provided: Role of Therapy;Plan of Care  Education Method: Verbal  Barriers to Learning: None  Education Outcome: Verbalized understanding    OT Follow Up:   OT D/C RECOMMENDATIONS  REQUIRES OT FOLLOW-UP: Yes       Assessment/Discharge Disposition:  Assessment: Pt is a 75year old man from home who presents to [facility] with the above deficits which impact his ability to perform ADLs at his baseline. Pt would benefit from continued OT to maximize independence and safety with ADL tasks.   Performance deficits / Impairments: Decreased functional mobility ,Decreased ADL status,Decreased endurance,Decreased balance,Decreased strength  Prognosis: Good  Discharge Recommendations: Continue to assess pending progress  Decision Making: Low Complexity  History: Pt's medical history is moderately complex  Exam: Pt. has 5 performance deficits  Assistance / Modification: Pt. requires SBA    AMPAC (Six Click) Self care Score How much help for putting on and taking off regular lower body clothing?: A Little  How much help for Bathing?: A Little  How much help for Toileting?: A Little  How much help for putting on and taking off regular upper body clothing?: A Little  How much help for taking care of personal grooming?: A Little  How much help for eating meals?: None  [facility] Daily Activity Raw Score: 19  AM-PAC Inpatient ADL T-Scale Score : 40.22  ADL Inpatient CMS 0-100% Score: 42.8    Therapy key for assistance levels -   Independent/Mod I = Pt. is able to perform task with no assistance but may require a device   Stand by assistance = Pt. does not perform task at an independent level but does not need physical assistance, requires verbal cues  Minimal, Moderate, Maximal Assistance = Pt. requires physical assistance (25%, 50%, 75% assist from helper) for task but is able to actively participate in task   Dependent = Pt. requires total assistance with task and is not able to actively participate with task completion     Plan:  Plan  Times per Week: 1-4x  Plan Weeks: Length of acute stay  Current Treatment Recommendations: Strengthening,Balance training,Functional mobility training,Endurance training,Patient/Caregiver education & training,Equipment evaluation, education, & procurement,Safety education & training,Self-Care / ADL,Home management training    Goals:   Patient will:    - Improve functional endurance to tolerate/complete 30 mins of ADL's  - Be Mod I in UB ADLs   - Be Mod I in LB ADLs  - Be Mod i in ADL transfers without LOB  - Be Mod I in toileting tasks  - Improve B UE strength and endurance to 4/5 in order to participate in self-care activities as projected. - Access appropriate D/C site with as few architectural barriers as possible.   - Sequence self-care tasks with no verbal cues for safety    Patient Goal: Patient goals : \"I want to get home\"     Discussed and agreed upon: Yes Comments:       Therapy Time: Individual   Time In 1515   Time Out 1530   Minutes 15     Eval: 15 minutes     Electronically signed by:     Melba Hill, OTR/L,   6/30/2022, 3:46 PM

## 2022-06-30 NOTE — CARE COORDINATION
LSW and care management team meet with pt at bedside. Quality round completed. Per RN, pt have Heart Cath schedule for tomorrow. LSW discuss DC plan with pt. Pt agree with DC plan home with Girlfriend. Possibly need for [facility]. Pt denies any needs at this time. LSW will follow if there Is any questions or concerns.      Electronically signed by Verner Plum, LSW on 6/30/2022 at 10:07 AM

## 2022-06-30 NOTE — PROGRESS NOTES
Physical Therapy Med Surg Initial Assessment  Facility/Department: [facility]  Room: [place] [address]       NAME: Maile Mohr  :  (75 y.o.)  MRN: [id number]  CODE STATUS: Full Code    Date of Service: 2022    Patient Diagnosis(es): KRYS (acute kidney injury) ([facility] [N17.9]  Gastrointestinal hemorrhage, unspecified gastrointestinal hemorrhage type [K92.2]   Chief Complaint   Patient presents with    Illness     pt c/o diarrhea, loss of appetite, BLE pain, and trouble sleeping     Patient Active Problem List    Diagnosis Date Noted    Gastrointestinal hemorrhage     KRYS (acute kidney injury) ([facility] 2022    Acute cholecystitis with chronic cholecystitis 04/15/2021    Chronic cholecystitis with calculus 2021        Past Medical History:   Diagnosis Date    KRYS (acute kidney injury) ([facility] 2022    Anemia     Back pain     chronic    CAD (coronary artery disease)     Closed fracture of neck of left femur (HCC)     Epilepsy ([facility]     Hyperlipidemia     Hypertension     Paroxysmal A-fib ([facility]     Shingles      Past Surgical History:   Procedure Laterality Date    CAROTID ENDARTERECTOMY      CHOLECYSTECTOMY, LAPAROSCOPIC N/A 2021    LAPAROSCOPIC CHOLECYSTECTOMY  WITH CHOLANGIOGRAMS performed by Vicente Wagner MD at [address] [place] [address] 2022    EGD DIAGNOSTIC ONLY performed by Jay Rodriguez MD at [facility]     Chief Reason: General Medical  Chart Reviewed: Yes  Patient assessed for rehabilitation services?: Yes  Family / Caregiver Present: No    Restrictions:  Restrictions/Precautions: Up as Tolerated     SUBJECTIVE:   Subjective: Pt c/o abdominal cramps    Pain   Pre treatment screenin/10 OR Faces -10  Location: abdominal    Description:cramps  Onset/duration: ongoing   [x]  Pt declined intervention  [x]  Pt able to proceed PT session  []  RN or physician notified  []  RN called to bedside to administer meds.  Post treatment screening 6/10, described as same as above     Prior Level of Function:  Social/Functional History  Lives With: Significant other  Type of Home: House  Home Layout: Two level,Bed/Bath upstairs (11-13 steps to second floor with rail)  Home Access: Stairs to enter with rails  Entrance Stairs - Number of Steps: 4  Bathroom Shower/Tub: Tub/Shower unit  Bathroom Equipment: Shower chair  Home Equipment: Walker, rolling,Cane  ADL Assistance: Independent  Homemaking Assistance: Independent  Ambulation Assistance: Independent  Transfer Assistance: Independent  Active : No  Patient's  Info: SO    OBJECTIVE:   Vision  Vision: Impaired  Vision Exceptions: Wears glasses at all times  Hearing: Within functional limits    Cognition:  Overall Orientation Status: Within Functional Limits (Except exact date)  Follows Commands: Within Functional Limits  Overall Cognitive Status: WFL  Cognition Comment: Increased time    Observation/Palpation  Posture: Fair  Observation: Pt mildly lethargic but arousable, pleasant, no acute distress, agreeable to OT eval, 97% on RA    ROM:  AROM: Within functional limits  PROM: Within functional limits    Strength:  Strength: Generally decreased, functional    Neuro:  Balance  Sitting - Static: Good  Sitting - Dynamic: Good  Standing - Static: Good;-  Standing - Dynamic: Fair;+                    Tone: Normal  Coordination: Within functional limits    Sensation: Intact    Bed mobility  Supine to Sit: Supervision  Sit to Supine: Supervision    Transfers  Sit to Stand: Supervision  Stand to sit: Supervision    Ambulation  Surface: level tile  Device: No Device  Assistance: Supervision  Quality of Gait: decreased gait speed  Gait Deviations: Slow Gaby  Distance: 200ft  Comments: pt reports weakness from \"laying in bed for 3 days\"     Activity Tolerance  Activity Tolerance: Patient tolerated evaluation without incident      Patient Education  Education Given To: Patient  Education Provided: Plan of Care;Role of Therapy  Education Provided Comments: benefits to daily ambulation to tolerance  Education Method: Verbal  Barriers to Learning: None  Education Outcome: Verbalized understanding     ASSESSMENT:   Body Structures, Functions, Activity Limitations Requiring Skilled Therapeutic Intervention: Decreased functional mobility ; Decreased strength;Decreased balance  Decision Making: Medium Complexity  History: high  Exam: med  Clinical Presentation: med  Specific Instructions for Next Treatment: gait, stairs  Therapy Prognosis: Good  Barriers to Learning: none     DISCHARGE RECOMMENDATIONS:  Discharge Recommendations: Continue to assess pending progress  Assessment: Pt demonstrates the above deficits and decline in functional mobility d/t prolonged immobility during hospital admission. Pt would benefit from physical therapy to address above deficits and allow for safe return home at highest level of function, decrease risk for falls, and improve QOL. Recommend pt have family support PRN upon d/c.      Requires PT Follow-Up: Yes       PLAN OF CARE:  Plan  Plan: 2-3 times per week  Specific Instructions for Next Treatment: gait, stairs  Current Treatment Recommendations: Strengthening,ROM,Balance training,Functional mobility training,Transfer training,Gait training,Stair training,Equipment evaluation, education, & procurement,Patient/Caregiver education & training,Therapeutic activities,Home exercise program    Safety Devices  Type of Devices: Left in bed,Bed alarm in place,Call light within reach    Goals:  Patient goals : to go home  Long Term Goals  Long term goal 1: Bed mobility with indep  Long term goal 2: Functional transfers with indep  Long term goal 3: Amb 300ft with indep  Long term goal 4: 12 steps with handrail and indep to enter/exit home    AMPAC (6 CLICK) BASIC MOBILITY  AM-PAC Inpatient Mobility Raw Score : 19     Therapy Time:   Individual   Time In 1515   Time Out 1530   Minutes [address] [place], [place], 06/30/22 at 4:15 PM     Definitions for assistance levels  Independent = pt does not require any physical supervision or assistance from another person for activity completion. Device may be needed.   Stand by assistance = pt requires verbal cues or instructions from another person, close to but not touching, to perform the activity  Minimal assistance= pt performs 75% or more of the activity; assistance is required to complete the activity  Moderate assistance= pt performs 50% of the activity; assistance is required to complete the activity  Maximal assistance = pt performs 25% of the activity; assistance is required to complete the activity  Dependent = pt requires total physical assistance to accomplish the task

## 2022-06-30 NOTE — PROGRESS NOTES
06/30/22     From:HOME W [facility]IRLF[facility]END AMB INDEPENDENTLY DR Baron Domínguez MANAGES COUMADIN & GETS FINGERSTICK TEST IN HIS OFFICE     Admit:KRYS, GI BLEEDING -BLACK TARRY STOOLS 1 WEEK     PMH:CAD, PAF (COUMADIN), EPILEPSY, KRYS, ANEMIA, HTN, HLP     Anticipated Discharge Disposition:RETURN HOME [facility] IF INDICATED     Patient Mobility or PT/OT ordered: YES     Consults: GI,CHO,INTENSIVIST     Clinical: 6/28 COVID NEG   INR 6.5--1.5 - 1.1       HGB 8.9--6.4--7.0 - 7.2  88/1.69--64/1.23 - 32/0.98 CXR-COPD  6/30 SR NOW    Barriers to Discharge: 6/30 SOME PVC'S WITH A RUN OF V-TACH  NPO--ICE CHIPS/HOLD OAC  HAD PLASMA 3UNITS/2 U RBC'S/IV FL/IV PPI   2 BOUTS OF URINARY RET, WILL GET HENSLEY NEXT TIME    Assessments: CMI DONE

## 2022-06-30 NOTE — PROGRESS NOTES
Pulmonary & Critical Care Medicine ICU Progress Note  Chief complaint : GI bleed    Subjunctive/24 hour events :   Patient seen and examined during multidisciplinary rounds with RN, charge nurse, RT, pharmacy, dietitian, and social service. Feeling better, he denies pain, would like to drink coffee, denies shortness of breath, he had 2 bowel movement, where brown/dark in color, no active bleed, no vomiting, no abdominal pain, temperature 98 °F, he had EGD done yesterday and no source of bleed. He had urine retention x2 requiring straight cath 800 cc drained each time, urine output 2400 cc. Had few runs of v tach       Social History     Tobacco Use    Smoking status: Current Every Day Smoker     Packs/day: 1.00     Years: 10.00     Pack years: 10.00     Types: Cigarettes    Smokeless tobacco: Never Used   Substance Use Topics    Alcohol use: Not Currently     History reviewed. No pertinent family history. No results for input(s): PHART, AYD8HDZ, PO2ART in the last 72 hours. MV Settings:     / / /            IV:   sodium chloride      sodium chloride         Vitals:  BP (!) 115/50   Pulse 67   Temp 98 °F (36.7 °C) (Oral)   Resp 18   Ht 5' 5\" (1.651 m)   Wt 137 lb 12.6 oz (62.5 kg)   SpO2 91%   BMI 22.93 kg/m²    Tmax:        Intake/Output Summary (Last 24 hours) at 6/30/2022 0711  Last data filed at 6/30/2022 0555  Gross per 24 hour   Intake 2519.06 ml   Output 2457 ml   Net 62.06 ml       EXAM:  General: alert, cooperative, no distress  Head: normocephalic, atraumatic  Eyes:No gross abnormalities. ENT:  MMM no lesions  Neck:  supple and no masses  Chest : clear to auscultation bilaterally- no wheezes, rales or rhonchi, normal air movement, no respiratory distress  Heart[de-identified] Heart sounds are normal.  Regular rate and rhythm without murmur, gallop or rub.   ABD:  symmetric, soft, non-tender  Musculoskeletal : no cyanosis, no clubbing and no edema  Neuro:  Grossly normal  Skin: No rashes or nodules noted.  Lymph node:  no cervical nodes  Urology: No Vega   Psychiatric: appropriate    Medications:  Scheduled Meds:   tamsulosin  0.4 mg Oral Daily    amiodarone  200 mg Oral BID    metoprolol tartrate  25 mg Oral BID    magnesium oxide  400 mg Oral Daily    pantoprazole  40 mg Oral BID AC    PHENobarbital  129.6 mg Oral BID       PRN Meds:  ondansetron, sodium chloride, sodium chloride    Results: reviewed by me   CBC:   Recent Labs     06/28/22 2229 06/28/22  2229 06/29/22  0511 06/29/22  0511 06/29/22  1324 06/29/22  2242 06/30/22  0435   WBC 10.7  --  10.3  --   --   --  7.0   HGB 6.4*   < > 7.0*   < > 7.0* 7.0* 7.2*   HCT 19.4*   < > 20.7*   < > 20.6* 21.2* 21.3*   MCV 93.6  --  92.2  --   --   --  90.8     --  181  --   --   --  202    < > = values in this interval not displayed. BMP:   Recent Labs     06/28/22 2229 06/29/22  0511 06/30/22  0435    138 137   K 4.5 4.8 4.3    109* 107   CO2 19* 20 19*   BUN 74* 64* 32*   CREATININE 1.35* 1.23* 0.98     LIVER PROFILE:   Recent Labs     06/28/22  1336 06/29/22  0511 06/30/22  0435   AST 15 45* 98*   ALT 14 16 24   LIPASE 18  --   --    BILITOT <0.2 0.3 <0.2   ALKPHOS 123* 87 101     PT/INR:   Recent Labs     06/28/22  1444 06/29/22  0511 06/30/22  0435   PROTIME 52.6* 17.9* 14.0   INR 6.2* 1.5 1.1     APTT:   Recent Labs     06/28/22  1444   APTT 46.2*     UA:No results for input(s): NITRITE, COLORU, PHUR, LABCAST, WBCUA, RBCUA, MUCUS, TRICHOMONAS, YEAST, BACTERIA, CLARITYU, SPECGRAV, LEUKOCYTESUR, UROBILINOGEN, BILIRUBINUR, BLOODU, GLUCOSEU, AMORPHOUS in the last 72 hours. Invalid input(s): Janis Standing    Cultures:  Negative so far  Films:  CXR reviewed by me and it showed hyperinflated, no infiltrate      Assessment:   This is a critically ill patient    · Acute blood loss anemia, EGD is negative, possibly intestinal bleed with supratherapeutic INR currently no active bleed  · Non-ST elevation MI  · Nonsustained V. tach, improved with amiodarone  · Acute kidney injury resolved  · Smoking  · Paroxysmal A. fib on Coumadin  · Urine retention  · History of seizure    Recommendation  · Monitor hemoglobin transfuse if less than 7  · Appreciate cardiology, potential plan for left heart cath tomorrow  · Appreciate GI  · INR corrected, resume anticoagulation when okay with GI Target INR 2-3  · Resume Flomax  · Continue to hold blood pressure meds for now for now  · Continue amiodarone  · Blood pressure dropped after 1 dose of 25 mg metoprolol, will give 1 bolus  cc, lowered metoprolol 12.5 mg p.o. twice daily might need to hold  · Patient continues to have urinary retention today, will place Vega catheter, Flomax started today, will remove Vega tomorrow       I spent 30 min with this patient, greater the 50% of this time was spent in counseling and/or coordinating of care.           Electronically signed by Cailin Callejas MD,  [facility] ,on 6/30/2022 at 7:11 AM

## 2022-07-01 ENCOUNTER — APPOINTMENT (OUTPATIENT)
Dept: CARDIAC CATH/INVASIVE PROCEDURES | Age: 67
DRG: 813 | End: 2022-07-01
Payer: MEDICARE

## 2022-07-01 ENCOUNTER — ANCILLARY PROCEDURE (OUTPATIENT)
Dept: ENDOSCOPY | Age: 67
DRG: 813 | End: 2022-07-01
Payer: MEDICARE

## 2022-07-01 ENCOUNTER — ANESTHESIA (OUTPATIENT)
Dept: ENDOSCOPY | Age: 67
DRG: 813 | End: 2022-07-01
Payer: MEDICARE

## 2022-07-01 ENCOUNTER — TELEPHONE (OUTPATIENT)
Dept: GASTROENTEROLOGY | Age: 67
End: 2022-07-01

## 2022-07-01 ENCOUNTER — ANESTHESIA EVENT (OUTPATIENT)
Dept: ENDOSCOPY | Age: 67
DRG: 813 | End: 2022-07-01
Payer: MEDICARE

## 2022-07-01 LAB
ALBUMIN SERPL-MCNC: 3.1 G/DL (ref 3.5–4.6)
ALP BLD-CCNC: 108 U/L (ref 35–104)
ALT SERPL-CCNC: 22 U/L (ref 0–41)
ANION GAP SERPL CALCULATED.3IONS-SCNC: 9 MEQ/L (ref 9–15)
AST SERPL-CCNC: 50 U/L (ref 0–40)
BASOPHILS ABSOLUTE: 0 K/UL (ref 0–0.2)
BASOPHILS RELATIVE PERCENT: 0.5 %
BILIRUB SERPL-MCNC: 0.3 MG/DL (ref 0.2–0.7)
BUN BLDV-MCNC: 12 MG/DL (ref 8–23)
CALCIUM SERPL-MCNC: 8.3 MG/DL (ref 8.5–9.9)
CHLORIDE BLD-SCNC: 110 MEQ/L (ref 95–107)
CO2: 20 MEQ/L (ref 20–31)
CREAT SERPL-MCNC: 0.97 MG/DL (ref 0.7–1.2)
EOSINOPHILS ABSOLUTE: 0.3 K/UL (ref 0–0.7)
EOSINOPHILS RELATIVE PERCENT: 4.5 %
GFR AFRICAN AMERICAN: >60
GFR NON-AFRICAN AMERICAN: >60
GLOBULIN: 1.7 G/DL (ref 2.3–3.5)
GLUCOSE BLD-MCNC: 90 MG/DL (ref 70–99)
HCT VFR BLD CALC: 22.4 % (ref 42–52)
HCT VFR BLD CALC: 22.8 % (ref 42–52)
HCT VFR BLD CALC: 23.5 % (ref 42–52)
HCT VFR BLD CALC: 23.7 % (ref 42–52)
HEMOGLOBIN: 7.5 G/DL (ref 14–18)
HEMOGLOBIN: 7.8 G/DL (ref 14–18)
HEMOGLOBIN: 7.9 G/DL (ref 14–18)
HEMOGLOBIN: 8.1 G/DL (ref 14–18)
INR BLD: 1.1
LYMPHOCYTES ABSOLUTE: 1.2 K/UL (ref 1–4.8)
LYMPHOCYTES RELATIVE PERCENT: 20.9 %
MAGNESIUM: 1.8 MG/DL (ref 1.7–2.4)
MCH RBC QN AUTO: 31.6 PG (ref 27–31.3)
MCHC RBC AUTO-ENTMCNC: 34.4 % (ref 33–37)
MCV RBC AUTO: 91.9 FL (ref 80–100)
MONOCYTES ABSOLUTE: 0.6 K/UL (ref 0.2–0.8)
MONOCYTES RELATIVE PERCENT: 10.1 %
NEUTROPHILS ABSOLUTE: 3.7 K/UL (ref 1.4–6.5)
NEUTROPHILS RELATIVE PERCENT: 64 %
PDW BLD-RTO: 13.6 % (ref 11.5–14.5)
PLATELET # BLD: 187 K/UL (ref 130–400)
POTASSIUM SERPL-SCNC: 4.3 MEQ/L (ref 3.4–4.9)
PROTHROMBIN TIME: 13.9 SEC (ref 12.3–14.9)
RBC # BLD: 2.58 M/UL (ref 4.7–6.1)
SODIUM BLD-SCNC: 139 MEQ/L (ref 135–144)
TOTAL PROTEIN: 4.8 G/DL (ref 6.3–8)
WBC # BLD: 5.8 K/UL (ref 4.8–10.8)

## 2022-07-01 PROCEDURE — 2580000003 HC RX 258

## 2022-07-01 PROCEDURE — 0DJD8ZZ INSPECTION OF LOWER INTESTINAL TRACT, VIA NATURAL OR ARTIFICIAL OPENING ENDOSCOPIC: ICD-10-PCS | Performed by: INTERNAL MEDICINE

## 2022-07-01 PROCEDURE — 93458 L HRT ARTERY/VENTRICLE ANGIO: CPT

## 2022-07-01 PROCEDURE — 6370000000 HC RX 637 (ALT 250 FOR IP): Performed by: INTERNAL MEDICINE

## 2022-07-01 PROCEDURE — 2580000003 HC RX 258: Performed by: INTERNAL MEDICINE

## 2022-07-01 PROCEDURE — 6360000002 HC RX W HCPCS: Performed by: NURSE ANESTHETIST, CERTIFIED REGISTERED

## 2022-07-01 PROCEDURE — 80053 COMPREHEN METABOLIC PANEL: CPT

## 2022-07-01 PROCEDURE — 85018 HEMOGLOBIN: CPT

## 2022-07-01 PROCEDURE — C1769 GUIDE WIRE: HCPCS

## 2022-07-01 PROCEDURE — 3609027000 HC COLONOSCOPY: Performed by: INTERNAL MEDICINE

## 2022-07-01 PROCEDURE — 2700000000 HC OXYGEN THERAPY PER DAY

## 2022-07-01 PROCEDURE — 2500000003 HC RX 250 WO HCPCS

## 2022-07-01 PROCEDURE — 6360000004 HC RX CONTRAST MEDICATION: Performed by: INTERNAL MEDICINE

## 2022-07-01 PROCEDURE — 36415 COLL VENOUS BLD VENIPUNCTURE: CPT

## 2022-07-01 PROCEDURE — 83735 ASSAY OF MAGNESIUM: CPT

## 2022-07-01 PROCEDURE — 99233 SBSQ HOSP IP/OBS HIGH 50: CPT | Performed by: INTERNAL MEDICINE

## 2022-07-01 PROCEDURE — 3700000001 HC ADD 15 MINUTES (ANESTHESIA): Performed by: INTERNAL MEDICINE

## 2022-07-01 PROCEDURE — B2111ZZ FLUOROSCOPY OF MULTIPLE CORONARY ARTERIES USING LOW OSMOLAR CONTRAST: ICD-10-PCS | Performed by: INTERNAL MEDICINE

## 2022-07-01 PROCEDURE — 2709999900 HC NON-CHARGEABLE SUPPLY

## 2022-07-01 PROCEDURE — 85610 PROTHROMBIN TIME: CPT

## 2022-07-01 PROCEDURE — C1894 INTRO/SHEATH, NON-LASER: HCPCS

## 2022-07-01 PROCEDURE — 2500000003 HC RX 250 WO HCPCS: Performed by: NURSE ANESTHETIST, CERTIFIED REGISTERED

## 2022-07-01 PROCEDURE — 6360000002 HC RX W HCPCS

## 2022-07-01 PROCEDURE — 85025 COMPLETE CBC W/AUTO DIFF WBC: CPT

## 2022-07-01 PROCEDURE — 3700000000 HC ANESTHESIA ATTENDED CARE: Performed by: INTERNAL MEDICINE

## 2022-07-01 PROCEDURE — 2000000000 HC ICU R&B

## 2022-07-01 PROCEDURE — 4A023N7 MEASUREMENT OF CARDIAC SAMPLING AND PRESSURE, LEFT HEART, PERCUTANEOUS APPROACH: ICD-10-PCS | Performed by: INTERNAL MEDICINE

## 2022-07-01 PROCEDURE — 45382 COLONOSCOPY W/CONTROL BLEED: CPT | Performed by: INTERNAL MEDICINE

## 2022-07-01 PROCEDURE — 85014 HEMATOCRIT: CPT

## 2022-07-01 PROCEDURE — B2151ZZ FLUOROSCOPY OF LEFT HEART USING LOW OSMOLAR CONTRAST: ICD-10-PCS | Performed by: INTERNAL MEDICINE

## 2022-07-01 PROCEDURE — 2580000003 HC RX 258: Performed by: NURSE ANESTHETIST, CERTIFIED REGISTERED

## 2022-07-01 PROCEDURE — 2709999900 HC NON-CHARGEABLE SUPPLY: Performed by: INTERNAL MEDICINE

## 2022-07-01 PROCEDURE — 93005 ELECTROCARDIOGRAM TRACING: CPT | Performed by: INTERNAL MEDICINE

## 2022-07-01 RX ORDER — SODIUM CHLORIDE 9 MG/ML
INJECTION, SOLUTION INTRAVENOUS PRN
Status: DISCONTINUED | OUTPATIENT
Start: 2022-07-01 | End: 2022-07-03 | Stop reason: HOSPADM

## 2022-07-01 RX ORDER — SIMETHICONE 20 MG/.3ML
EMULSION ORAL PRN
Status: DISCONTINUED | OUTPATIENT
Start: 2022-07-01 | End: 2022-07-01 | Stop reason: ALTCHOICE

## 2022-07-01 RX ORDER — SODIUM CHLORIDE 0.9 % (FLUSH) 0.9 %
5-40 SYRINGE (ML) INJECTION EVERY 12 HOURS SCHEDULED
Status: DISCONTINUED | OUTPATIENT
Start: 2022-07-01 | End: 2022-07-03 | Stop reason: HOSPADM

## 2022-07-01 RX ORDER — PROPOFOL 10 MG/ML
INJECTION, EMULSION INTRAVENOUS PRN
Status: DISCONTINUED | OUTPATIENT
Start: 2022-07-01 | End: 2022-07-01 | Stop reason: SDUPTHER

## 2022-07-01 RX ORDER — SODIUM CHLORIDE 0.9 % (FLUSH) 0.9 %
5-40 SYRINGE (ML) INJECTION PRN
Status: DISCONTINUED | OUTPATIENT
Start: 2022-07-01 | End: 2022-07-03 | Stop reason: HOSPADM

## 2022-07-01 RX ORDER — ACETAMINOPHEN 325 MG/1
650 TABLET ORAL EVERY 4 HOURS PRN
Status: DISCONTINUED | OUTPATIENT
Start: 2022-07-01 | End: 2022-07-03 | Stop reason: HOSPADM

## 2022-07-01 RX ORDER — 0.9 % SODIUM CHLORIDE 0.9 %
500 INTRAVENOUS SOLUTION INTRAVENOUS ONCE
Status: COMPLETED | OUTPATIENT
Start: 2022-07-01 | End: 2022-07-01

## 2022-07-01 RX ORDER — LIDOCAINE HYDROCHLORIDE 20 MG/ML
INJECTION, SOLUTION INFILTRATION; PERINEURAL PRN
Status: DISCONTINUED | OUTPATIENT
Start: 2022-07-01 | End: 2022-07-01 | Stop reason: SDUPTHER

## 2022-07-01 RX ORDER — MAGNESIUM HYDROXIDE 1200 MG/15ML
LIQUID ORAL PRN
Status: DISCONTINUED | OUTPATIENT
Start: 2022-07-01 | End: 2022-07-01 | Stop reason: ALTCHOICE

## 2022-07-01 RX ADMIN — PHENYLEPHRINE HYDROCHLORIDE 100 MCG: 10 INJECTION INTRAVENOUS at 13:09

## 2022-07-01 RX ADMIN — PHENOBARBITAL 129.6 MG: 32.4 TABLET ORAL at 20:55

## 2022-07-01 RX ADMIN — SODIUM CHLORIDE, PRESERVATIVE FREE 10 ML: 5 INJECTION INTRAVENOUS at 20:59

## 2022-07-01 RX ADMIN — PROPOFOL 50 MG: 10 INJECTION, EMULSION INTRAVENOUS at 13:02

## 2022-07-01 RX ADMIN — AMIODARONE HYDROCHLORIDE 200 MG: 200 TABLET ORAL at 09:37

## 2022-07-01 RX ADMIN — TAMSULOSIN HYDROCHLORIDE 0.4 MG: 0.4 CAPSULE ORAL at 09:37

## 2022-07-01 RX ADMIN — SODIUM CHLORIDE 500 ML: 9 INJECTION, SOLUTION INTRAVENOUS at 11:36

## 2022-07-01 RX ADMIN — PROPOFOL 50 MG: 10 INJECTION, EMULSION INTRAVENOUS at 12:54

## 2022-07-01 RX ADMIN — Medication 400 MG: at 20:55

## 2022-07-01 RX ADMIN — PANTOPRAZOLE SODIUM 40 MG: 40 TABLET, DELAYED RELEASE ORAL at 18:24

## 2022-07-01 RX ADMIN — SODIUM CHLORIDE 500 ML: 9 INJECTION, SOLUTION INTRAVENOUS at 03:06

## 2022-07-01 RX ADMIN — PANTOPRAZOLE SODIUM 40 MG: 40 TABLET, DELAYED RELEASE ORAL at 07:16

## 2022-07-01 RX ADMIN — PROPOFOL 100 MG: 10 INJECTION, EMULSION INTRAVENOUS at 12:45

## 2022-07-01 RX ADMIN — PHENYLEPHRINE HYDROCHLORIDE 100 MCG: 10 INJECTION INTRAVENOUS at 13:02

## 2022-07-01 RX ADMIN — PHENOBARBITAL 129.6 MG: 32.4 TABLET ORAL at 09:36

## 2022-07-01 RX ADMIN — Medication 400 MG: at 09:37

## 2022-07-01 RX ADMIN — AMIODARONE HYDROCHLORIDE 200 MG: 200 TABLET ORAL at 20:55

## 2022-07-01 RX ADMIN — LIDOCAINE HYDROCHLORIDE 50 MG: 20 INJECTION, SOLUTION INFILTRATION; PERINEURAL at 12:45

## 2022-07-01 RX ADMIN — SODIUM CHLORIDE, PRESERVATIVE FREE 10 ML: 5 INJECTION INTRAVENOUS at 21:00

## 2022-07-01 RX ADMIN — PROPOFOL 50 MG: 10 INJECTION, EMULSION INTRAVENOUS at 12:50

## 2022-07-01 RX ADMIN — METOPROLOL TARTRATE 25 MG: 25 TABLET, FILM COATED ORAL at 20:55

## 2022-07-01 RX ADMIN — SODIUM CHLORIDE, PRESERVATIVE FREE 10 ML: 5 INJECTION INTRAVENOUS at 09:00

## 2022-07-01 RX ADMIN — METOPROLOL TARTRATE 12.5 MG: 25 TABLET, FILM COATED ORAL at 09:37

## 2022-07-01 ASSESSMENT — ENCOUNTER SYMPTOMS
COUGH: 0
SHORTNESS OF BREATH: 0
NAUSEA: 0
VOMITING: 0
DIARRHEA: 0

## 2022-07-01 ASSESSMENT — LIFESTYLE VARIABLES: SMOKING_STATUS: 1

## 2022-07-01 ASSESSMENT — PAIN - FUNCTIONAL ASSESSMENT: PAIN_FUNCTIONAL_ASSESSMENT: 0-10

## 2022-07-01 ASSESSMENT — PAIN SCALES - GENERAL
PAINLEVEL_OUTOF10: 0

## 2022-07-01 NOTE — PROGRESS NOTES
Basic Nursing Care: Adult/Older Adult Patient Progress Note    Data:      0710: Report received from  Night shift RN, resumed care of patient    0900: Tap water enema completed, clear stool noted. 3282: Attempted to call endo at [phone], answering machine picks up    1010: Pt transported to Endoscopy unit per bed on transport monitor. Update given to nurse at bedside. 1320: Pt returned to ICU bed 8 per Endo nurse and CRNA. Pt connected to ICU monitor. O2 place per nasal cannula at 2 L until pt wakes up more. Vitals stable at this time. 1547: Dr. Venora Gilford here to discuss cardiac cath procedure. 1555: Pt transported to cath lab per per bed on transport monitor. 1731: Pt returned to ICU bed 8 from cath lab. See procedure note. T-band in place on right radial. Pt awake, alert and oriented. VSS    1825: removed 2 ml from T-Ban. Vitals stable, neurovascular check completed. 1931: 2 ml removed from T-Ban.

## 2022-07-01 NOTE — BRIEF OP NOTE
Patient underwent left heart catheterization LV ao pullback selective coronary angiography via right radial approach    Indication: Nonsustained ventricular tachycardia, non-ST elevation myocardial infarction unclear if type I or II, in patient with several risk factors for coronary artery disease, in the setting of acute GI blood loss anemia. No immediate complications  Patient has left dominant system 75% tubular stenosis in the proximal left circumflex artery and 70 to 75% tubular stenosis in the proximal left anterior descending artery nondominant right coronary artery. LVEDP 12 mmHg, no systolic gradient across the aortic valve. No immediate complications. I had to give 3000 units of IV heparin because of tendency for blood clot in the catheter. Recommendations:    Per GI recommendations patient to stay off antiplatelet therapy and anticoagulation. Patient with vascular disease in multiple territories. Coronary angiograms will be sent over to [address], for further review, if patient is a candidate for coronary artery bypass grafting that may be his best option. Otherwise both lesions are amenable to intervention. Echocardiogram shows severe pulmonary hypertension the etiology of which is unclear.   Additional recommendations to be based on clinical course  40 cc of contrast  Less than 10 cc blood loss

## 2022-07-01 NOTE — PROGRESS NOTES
[id number]: [facility] Pt currently has PRBC's transfusing. Using [facility] every couple of minutes with nursing supervision. Pt A&O x 4, no pain, strong in all extremities. Lungs clear, S1, S2 heart sounds noted, abdomen soft non tender. Borja catheter in place due to previously having urine retention, putting out clear urine. 3102-5286: Pt became hypotensive at 102/36 (62), Josee Salazar NP notified - 1L NS bolus ordered - see EMAR. 0245: Pt became hypotensive bp 103/31 (58), Dr. Lissa Brown notified 500 cc NS bolus - see EMAR.    5900: Report to endo RN given    1550 Urine output from borja catheter - light yellow with some sediment noted in tube. 12 total bm throughout the night    [id number]: Shift handoff report given to Laurence Galarza RN.

## 2022-07-01 NOTE — ANESTHESIA POSTPROCEDURE EVALUATION
Department of Anesthesiology  Postprocedure Note    Patient: Phuong Velasquez  MRN: [id number]  [place]: 1955  Date of evaluation: 7/1/2022      Procedure Summary     Date: 07/01/22 Room / Location: [address] [place] [address]    Anesthesia Start: 8434 Anesthesia Stop:     Procedure: COLONOSCOPY WITH INTERVENTION (N/A ) Diagnosis:       Acute blood loss anemia      (Acute blood loss anemia)    Surgeons: Pao Murphy MD Responsible Provider: KATHY Nicole CRNA    Anesthesia Type: MAC ASA Status: 3          Anesthesia Type: No value filed.     Fox Phase I: Fox Score: 10    Fox Phase II:        Anesthesia Post Evaluation    Patient location during evaluation: ICU  Patient participation: complete - patient participated  Level of consciousness: awake  Pain score: 0  Airway patency: patent  Nausea & Vomiting: no nausea  Complications: no  Cardiovascular status: hemodynamically stable  Respiratory status: acceptable  Hydration status: stable

## 2022-07-01 NOTE — PROGRESS NOTES
ventricular ejection fraction is visually estimated at 65-70%. DUST with mild increase LVOT velocity due to hyperdyanmic LV   Concentric LVH   Normal right ventricle structure and function. RVSP calculated at >70%, however TR jet not optimally interogated and RV   not dilated or hypertrophic. Doppler study needs repeated. Normal mitral valve structure   2+ MR   Normal aortic valve structure and function. Normal tricuspid valve structure and function. Mild-to-moderate tricuspid regurgitation. Normal pulmonic valve structure and function. Normal left atrium. Signature    Assessment:  Non q mi  GI bleeding   Hypotension--improved  Plan:  1.  Pt getting colonoscopy now and hopefully cath later by Dr Jayson Solano  2. BP improved --increase the lopressor  Electronically signed by Abdi Anthony MD on 7/1/2022 at 11:20 AM

## 2022-07-01 NOTE — ANESTHESIA PRE PROCEDURE
Department of Anesthesiology  Preprocedure Note       Name:  Adela Reardon   Age:  75 y.o.  :  1955                                          MRN:  [id number]         Date:  2022      Surgeon: Corey Richards):  Philip Bird MD    Procedure: Procedure(s):  COLONOSCOPY DIAGNOSTIC    Medications prior to admission:   Prior to Admission medications    Medication Sig Start Date End Date Taking?  Authorizing Provider   aspirin 81 MG EC tablet Take 81 mg by mouth daily 21  Yes Historical Provider, MD   atorvastatin (LIPITOR) 80 MG tablet Take 80 mg by mouth at bedtime 20  Yes Historical Provider, MD   ferrous sulfate (IRON 325) 325 (65 Fe) MG tablet Take 325 mg by mouth daily 21  Yes Historical Provider, MD   Cholecalciferol 50 MCG (2000 UT) TABS Take 2,000 Units by mouth daily    Historical Provider, MD   dicyclomine (BENTYL) 10 MG capsule Take 1 capsule by mouth every 6 hours as needed (cramps)  Patient not taking: Reported on 2022   Hugo Crouch PA-C   clopidogrel (PLAVIX) 75 MG tablet Take 75 mg by mouth daily  Patient not taking: Reported on 2022    Historical Provider, MD   lisinopril (PRINIVIL;ZESTRIL) 10 MG tablet Take by mouth daily    Historical Provider, MD   PHENobarbital (LUMINAL) 32.4 MG tablet Take 129.6 mg by mouth 2 times daily    Historical Provider, MD   ibuprofen (ADVIL;MOTRIN) 800 MG tablet Take 1 tablet by mouth every 8 hours as needed for Pain or Fever  Patient not taking: Reported on 2022 10/19/16   Lavinia Laura DO       Current medications:    Current Facility-Administered Medications   Medication Dose Route Frequency Provider Last Rate Last Admin    metoprolol tartrate (LOPRESSOR) tablet 25 mg  25 mg Oral BID Julissa Helton MD        tamsulosin [facility]) capsule 0.4 mg  0.4 mg Oral Daily Jolie Reese MD   0.4 mg at 22 9085    sodium chloride flush 0.9 % injection 5-40 mL  5-40 mL IntraVENous 2 times per day Julissa Helton MD   10 mL at 06/30/22 2041    sodium chloride flush 0.9 % injection 5-40 mL  5-40 mL IntraVENous PRN Gaby Polanco, MD        0.9 % sodium chloride infusion   IntraVENous PRN Gaby Polanco,  mL/hr at 07/01/22 1136 500 mL at 07/01/22 1136    ondansetron (ZOFRAN) injection 4 mg  4 mg IntraVENous Q6H PRN Gaby Polanco, MD        predniSONE (DELTASONE) tablet 50 mg  50 mg Oral Once [place] Patches, MD        diphenhydrAMINE (BENADRYL) tablet 50 mg  50 mg Oral Once [place] Patches, MD        nitroGLYCERIN (NITROSTAT) SL tablet 0.4 mg  0.4 mg SubLINGual Q5 Min PRN [place] Patches, MD        magnesium oxide (MAG-OX) tablet 400 mg  400 mg Oral BID Gaby Patches, MD   400 mg at 07/01/22 5426    0.9 % sodium chloride infusion   IntraVENous PRN Frank Flor MD        amiodarone (CORDARONE) tablet 200 mg  200 mg Oral BID Gaby Patches, MD   200 mg at 07/01/22 3115    pantoprazole (PROTONIX) tablet 40 mg  40 mg Oral BID AC Ting Parr MD   40 mg at 07/01/22 0716    ondansetron (ZOFRAN) injection 4 mg  4 mg IntraVENous Q6H PRN Frank Flor MD        PHENobarbital (LUMINAL) tablet 129.6 mg  129.6 mg Oral BID Frank Flor MD   129.6 mg at 07/01/22 0936    0.9 % sodium chloride infusion   IntraVENous PRN Frank Flor MD        0.9 % sodium chloride infusion   IntraVENous PRN Jenn Shrestha MD         Facility-Administered Medications Ordered in Other Encounters   Medication Dose Route Frequency Provider Last Rate Last Admin    sodium chloride 0.9 % infusion                Allergies:     Allergies   Allergen Reactions    Penicillins Nausea And Vomiting     Other reaction(s): Myalgia       Problem List:    Patient Active Problem List   Diagnosis Code    Chronic cholecystitis with calculus K80.10    Acute cholecystitis with chronic cholecystitis K81.2    KRYS (acute kidney injury) ([facility] N17.9    Gastrointestinal hemorrhage K92.2       Past Medical History:        Diagnosis Date    [facility]    Anemia     Back pain     chronic    CAD (coronary artery disease)     Closed fracture of neck of left femur (HCC)     Epilepsy ([facility]     Hyperlipidemia     Hypertension     Paroxysmal A-fib ([facility]     Shingles        Past Surgical History:        Procedure Laterality Date    CAROTID ENDARTERECTOMY      CHOLECYSTECTOMY, LAPAROSCOPIC N/A 04/13/2021    LAPAROSCOPIC CHOLECYSTECTOMY  WITH CHOLANGIOGRAMS performed by Aleena Yusuf MD at [facility].  ENDOSCOPY N/A 6/29/2022    EGD DIAGNOSTIC ONLY performed by Verito Ireland MD at [facility] History:    Social History     Tobacco Use    Smoking status: Current Every Day Smoker     Packs/day: 1.00     Years: 10.00     Pack years: 10.00     Types: Cigarettes    Smokeless tobacco: Never Used   Substance Use Topics    Alcohol use: Not Currently                                Ready to quit: No  Counseling given: Yes      Vital Signs (Current):   Vitals:    07/01/22 0700 07/01/22 0930 07/01/22 0937 07/01/22 1121   BP: (!) 127/45  120/69 (!) 128/45   Pulse: 64  65 58   Resp: 17   18   Temp:  36.6 °C (97.9 °F)     TempSrc:  Oral     SpO2: 97%   100%   Weight:    135 lb (61.2 kg)   Height:    5' 5\" (1.651 m)                                              BP Readings from Last 3 Encounters:   07/01/22 (!) 128/45   10/15/21 (!) 152/69   04/29/21 112/68       NPO Status: Time of last liquid consumption: 0930 (sip if water with meds)                        Time of last solid consumption: 2100                        Date of last liquid consumption: 07/01/22                        Date of last solid food consumption: 06/28/22    BMI:   Wt Readings from Last 3 Encounters:   07/01/22 135 lb (61.2 kg)   10/15/21 103 lb (46.7 kg)   04/29/21 127 lb (57.6 kg)     Body mass index is 22.47 kg/m².     CBC:   Lab Results   Component Value Date/Time    WBC 5.8 07/01/2022 05:26 AM    RBC 2.58 07/01/2022 05:26 AM    HGB 8.1 07/01/2022 05:26 AM    HCT 23.7 07/01/2022 05:26 AM    MCV 91.9 07/01/2022 05:26 AM    RDW 13.6 07/01/2022 05:26 AM     07/01/2022 05:26 AM       CMP:   Lab Results   Component Value Date/Time     07/01/2022 05:26 AM    K 4.3 07/01/2022 05:26 AM     07/01/2022 05:26 AM    CO2 20 07/01/2022 05:26 AM    BUN 12 07/01/2022 05:26 AM    CREATININE 0.97 07/01/2022 05:26 AM    GFRAA >60.0 07/01/2022 05:26 AM    LABGLOM >60.0 07/01/2022 05:26 AM    GLUCOSE 90 07/01/2022 05:26 AM    PROT 4.8 07/01/2022 05:26 AM    CALCIUM 8.3 07/01/2022 05:26 AM    BILITOT 0.3 07/01/2022 05:26 AM    ALKPHOS 108 07/01/2022 05:26 AM    AST 50 07/01/2022 05:26 AM    ALT 22 07/01/2022 05:26 AM       POC Tests:   Recent Labs     06/28/22 2148   POCGLU 129*       Coags:   Lab Results   Component Value Date/Time    PROTIME 13.9 07/01/2022 05:26 AM    INR 1.1 07/01/2022 05:26 AM    APTT 46.2 06/28/2022 02:44 PM       HCG (If Applicable): No results found for: PREGTESTUR, PREGSERUM, HCG, HCGQUANT     ABGs: No results found for: PHART, PO2ART, OTF5KRX, HRA4FYN, BEART, [id number]     Type & Screen (If Applicable):  No results found for: LABABO, LABRH    Drug/Infectious Status (If Applicable):  No results found for: HIV, HEPCAB    COVID-19 Screening (If Applicable):   Lab Results   Component Value Date/Time    COVID19 Not Detected 06/28/2022 01:39 PM           Anesthesia Evaluation    Airway: Mallampati: II          Dental:    (+) poor dentition      Pulmonary: breath sounds clear to auscultation  (+) current smoker                           Cardiovascular:    (+) hypertension:, CAD:,         Rhythm: regular                      Neuro/Psych:   Negative Neuro/Psych ROS              GI/Hepatic/Renal: Neg GI/Hepatic/Renal ROS            Endo/Other: Negative Endo/Other ROS                    Abdominal:       Abdomen: soft. Vascular: negative vascular ROS.          Other Findings: Anesthesia Plan      MAC     ASA 3       Induction: intravenous. Anesthetic plan and risks discussed with patient. Plan discussed with attending.                     KATHY Cote - SHEMAR   7/1/2022

## 2022-07-01 NOTE — PROGRESS NOTES
Physical Therapy Missed Treatment   Facility/Department: [facility] MED SURG [facility]- GC OR/NONE    NAME: Elizabet Rose    :  (75 y.o.)  MRN: [id number]    Account: [de-identified]  Gender: male    Chart reviewed, attempted PT at . Patient unavailable 2° to:    [] Hold per nsg request    [] Pt declined   [] Nsg notified   [] Other notified    [x] Pt. . off floor for test/procedure. Colonoscopy     [] Pt. Unavailable       Will attempt PT treatment again at earliest convenience.       Electronically signed by Robert Farmer PTA on 22 at 1:05 PM EDT

## 2022-07-01 NOTE — PROGRESS NOTES
06/30/22  0435 06/30/22  1447 06/30/22 2133 06/30/22 2133 07/01/22  0018 07/01/22  0403 07/01/22  0526   WBC 7.0  --  8.3  --   --   --  5.8   RBC 2.34*  --  2.93*  --   --   --  2.58*   HGB 7.2*   < > 9.1*   < > 7.5* 7.8* 8.1*   HCT 21.3*   < > 26.9*   < > 22.4* 22.8* 23.7*   MCV 90.8  --  91.7  --   --   --  91.9   RDW 13.7  --  13.5  --   --   --  13.6     --  216  --   --   --  187    < > = values in this interval not displayed. CHEMISTRIES:  Recent Labs     06/29/22  0511 06/29/22  0930 06/30/22 0435 06/30/22 2133 07/01/22  0526   NA   < >  --  137 138 139   K   < >  --  4.3 4.3 4.3   CL   < >  --  107 108* 110*   CO2   < >  --  19* 21 20   BUN   < >  --  32* 17 12   CREATININE   < >  --  0.98 1.01 0.97   GLUCOSE   < >  --  82 102* 90   MG  --  2.1 1.9  --  1.8    < > = values in this interval not displayed. PT/INR:  Recent Labs     06/29/22  0511 06/30/22 0435 07/01/22  0526   PROTIME 17.9* 14.0 13.9   INR 1.5 1.1 1.1     APTT:  Recent Labs     06/28/22  1444   APTT 46.2*     LIVER PROFILE:  Recent Labs     06/29/22  0511 06/30/22 0435 07/01/22  0526   AST 45* 98* 50*   ALT 16 24 22   BILITOT 0.3 <0.2 0.3   ALKPHOS 87 101 108*       Imaging Last 24 Hours:  XR CHEST (2 VW)    Result Date: 6/28/2022  EXAMINATION:  CHEST. CLINICAL HISTORY:  COUGH. COMPARISONS:  4/8/2017. TECHNIQUE:  PA and lateral. FINDINGS:  Heart size and contour are within normal limits. Pulmonary vascularity is normal. No infiltrate or effusion is seen. There is no evidence of a mass or adenopathy. Lungs appear somewhat hyperinflated. There is deformity of the left shoulder. PROBABLE COPD. DEFORMITY LEFT SHOULDER. Ashley Stands     Assessment//Plan           Hospital Problems           Last Modified POA    * (Principal) KRYS (acute kidney injury) ([facility] 6/28/2022 Yes    Gastrointestinal hemorrhage 6/29/2022 Yes      1) KRYS  2) acute post hemorrhagic anemia  3 afib with RVR  4) hyponatremia and hyperkalemia  5) ho seizures  6) diarhea  7) elevated trop non cardiac causes  8) Rhabdo  9) Vtach  Assessment & Plan    6/29: Patient was transferred to ICU for hypotension and A. fib with RVR. Follow cardiology recommendation, patient is not on pressors currently. Follow-up GI about endoscopy. Status post unit of PRBC and fresh frozen plasma. For coagulopathy secondary to extrinsic factors, transfuse to keep hemoglobin over 7. No other complaints. Hyponatremia and hyperkalemia resolved  6/30: Still waiting for report of endoscopy from [facility], spoke with nursing and/. Patient went for possible cardiac cath tomorrow. Advance diet if is okay with GI. Status post EGD did not show active bleed. Patient had to straight cath due to unable to urinate due to most likely anesthesia yesterday during EGD will monitor. Bladder scan today spoke with nursing. Started on flomax. diarhea resolved, KRYS resolved,   7/1: Patient received another unit of blood yesterday. Plan for colonoscopy today and later on coronary angiography. Records from [facility] pressure was reviewed. Spoke with nursing to let GI know about that polyps and a circumferential mass that was found on the colonoscopy and [facility]. Patient says that the biopsy was negative for cancer.   Electronically signed by Emilia Riddle MD on 6/29/22 at 11:32 AM EDT

## 2022-07-01 NOTE — OP NOTE
INDICATIONS:  The patient is a 75 y.o. male who presented with an acute GI bleed, acute blood loss anemia, elevated troponin, has multiple cardiac risk factors, cerebrovascular disease, had hypoprothrombinemia, nicotine dependence positive family history, diffuse ST depression while tachycardic, and runs of nonsustained ventricular tachycardia requiring high risk medication. PROCEDURE:  Left heart catheterization, coronary angiography,LV/Ao pull back was performed. Procedure was coordinated with GI procedural timing. Benefits, alternatives and risks of the procedure were explained to the patient to include but not limited to MI, Stroke, Death, Allergic reaction to dye, bleeding, risk of kidney injury, and possible need for emergent coronary artery bypass grafting and informed consent was obtained. The patient was brought to the cath lab and was prepped and draped in the normal sterile technique. IV conscious sedation was maintained. 1% lidocaine was used for local anesthesia. DESCRIPTION OF PROCEDURE: Under local anesthesia, a 5/6 slender short sheath was placed in the right radial artery. All catheter exchanges were made over a 0.035 guidewire. Intra-arterial nicardipine and nitroglycerin was administered. Heparin was not administered per GI recommendations, however patient had a small clot in the 4 Yi catheter and at that time 3000 units of IV heparin was administered. 5 [place] JL 3.5 diagnostic catheter was advanced, and the left main coronary artery was selectively engaged. Angiography of the left system was performed in multiple orthogonal views. 5 [place] JR4 diagnostic catheter was advanced but would not selectively engage the right coronary artery and was removed. 4 [place] AL-1 diagnostic catheter was advanced, but this catheter had a clot and was removed. At this point 3000 units of IV heparin was administered.   5 [place] AL-1 diagnostic catheter was advanced, and the right coronary artery was selectively engaged and angiography was performed. Intra-arterial nitroglycerin was administered in the right coronary artery. The 5 [place] AL-1 diagnostic catheter was then advanced across the aortic valve into the left ventricle. Left ventricular end-diastolic pressure was measured and a slow manual pullback was performed across the aortic valve. The catheter was removed. The right radial artery sheath was removed ACT was documented at 140. Patient was transferred to the cardiac intensive care unit. During the procedure, following the intra-arterial nitroglycerin and nicardipine, patient's blood pressure dropped into the 70s, he was asymptomatic, patient was given a fluid bolus and 200 mcg of Akira-Synephrine. Blood pressure promptly responded. There were no immediate complications. HEMODYNAMIC / ANGIOGRAPHIC DATA:    1. Left ventricular end diastolic pressure was 78-59 mmHg. No systolic gradient across the aortic valve. 2. Left ventriculography was deferred  3. The left main coronary artery arises from the left sinus of Valsalva, bifurcates into the left anterior descending artery and the left circumflex artery. There is moderate calcification in the left main coronary artery. 4. The left anterior descending artery is a large vessel that curves around the left ventricular apex. Proximal left anterior descending artery has tubular 70 to 75% stenosis spanning more than 25 mm, with moderate diffuse calcification in the proximal and proximal mid segments of the left anterior descending artery. Mid left anterior descending artery has 40% smooth stenosis. Diagonal branches measure about 2.25 mm, second diagonal branch has 50% ostial stenosis. Distal LAD has less than 10% stenosis. Pat Rendon 5. The left circumflex is large and dominant. There are 4 obtuse marginal branches each of which measure 2.25 to 2.5 mm.   The proximal left circumflex artery has eccentric tubular 75% stenosis more appreciated in the cranial views. 6. The right coronary artery is nondominant, initial cannulation of the right coronary artery caused vasospasm and an apparent lesion, intracoronary nitroglycerin was administered and the so-called lesion resolved. RECOMMENDATIONS:  Post-procedure care will focus on prevention of any ischemic events and congestive complications. This is a patient with left dominant system and disease in both the LAD and the circumflex. Although these lesions are amenable to intervention, given this patient's risk factors and possibility of noncompliance and follow-up, coronary artery bypass grafting should be given the first thought. If he is not felt to be a candidate for coronary artery bypass grafting because of his comorbidities, percutaneous coronary intervention can be performed. We are told that the risk of GI bleeding is high with any anticoagulation or antiplatelet therapy, so aspirin and clopidogrel and warfarin are all on hold. Will await GI recommendations as to when these medicines can be started.   Isidro Mckeon MD

## 2022-07-01 NOTE — TELEPHONE ENCOUNTER
Silvio Nunes from ICU- is calling in regards to this patient.      Please call [phone] to speak with her

## 2022-07-01 NOTE — PROGRESS NOTES
Pulmonary & Critical Care Medicine ICU Progress Note  Chief complaint : GI bleed    Subjunctive/24 hour events :   Patient seen and examined during multidisciplinary rounds with RN, charge nurse, RT, pharmacy, dietitian, and social service. Doing good, has no complaint, denies chest pain, no shortness of breath, no active bleed, he is scheduled for colonoscopy today, no nausea or vomiting, no shortness of breath. Required 1500 cc boluses overnight for borderline blood pressure, currently stabilized, temperature 97.9, he is on room air saturation 96%, urine output 3300 cc      Social History     Tobacco Use    Smoking status: Current Every Day Smoker     Packs/day: 1.00     Years: 10.00     Pack years: 10.00     Types: Cigarettes    Smokeless tobacco: Never Used   Substance Use Topics    Alcohol use: Not Currently     History reviewed. No pertinent family history. No results for input(s): PHART, IZH1AFN, PO2ART in the last 72 hours. MV Settings:     / / /            IV:   sodium chloride      sodium chloride      sodium chloride      sodium chloride         Vitals:  BP (!) 127/45   Pulse 64   Temp 97.9 °F (36.6 °C) (Oral)   Resp 17   Ht 5' 5\" (1.651 m)   Wt 137 lb 12.6 oz (62.5 kg)   SpO2 97%   BMI 22.93 kg/m²    Tmax:        Intake/Output Summary (Last 24 hours) at 7/1/2022 0715  Last data filed at 7/1/2022 0530  Gross per 24 hour   Intake 1243.56 ml   Output 3300 ml   Net -2056.44 ml       EXAM:  General: alert, cooperative, no distress  Head: normocephalic, atraumatic  Eyes:No gross abnormalities. ENT:  MMM no lesions  Neck:  supple and no masses  Chest : Good air movement, clear to auscultation, no wheezing, no rales, nontender, tympanic  Heart[de-identified] Heart sounds are normal.  Regular rate and rhythm without murmur, gallop or rub. ABD:  symmetric, soft, non-tender  Musculoskeletal : no cyanosis, no clubbing and no edema  Neuro:  Grossly normal  Skin: No rashes or nodules noted.   Lymph node:  no cervical nodes  Urology: No Vega   Psychiatric: appropriate    Medications:  Scheduled Meds:   tamsulosin  0.4 mg Oral Daily    metoprolol tartrate  12.5 mg Oral BID    sodium chloride flush  5-40 mL IntraVENous 2 times per day    predniSONE  50 mg Oral Once    diphenhydrAMINE  50 mg Oral Once    magnesium oxide  400 mg Oral BID    amiodarone  200 mg Oral BID    pantoprazole  40 mg Oral BID AC    PHENobarbital  129.6 mg Oral BID       PRN Meds:  sodium chloride flush, sodium chloride, ondansetron, nitroGLYCERIN, sodium chloride, ondansetron, sodium chloride, sodium chloride    Results: reviewed by me   CBC:   Recent Labs     06/30/22 0435 06/30/22  1447 06/30/22 2133 06/30/22 2133 07/01/22  0018 07/01/22 0403 07/01/22  0526   WBC 7.0  --  8.3  --   --   --  5.8   HGB 7.2*   < > 9.1*   < > 7.5* 7.8* 8.1*   HCT 21.3*   < > 26.9*   < > 22.4* 22.8* 23.7*   MCV 90.8  --  91.7  --   --   --  91.9     --  216  --   --   --  187    < > = values in this interval not displayed. BMP:   Recent Labs     06/30/22 0435 06/30/22 2133 07/01/22  0526    138 139   K 4.3 4.3 4.3    108* 110*   CO2 19* 21 20   BUN 32* 17 12   CREATININE 0.98 1.01 0.97     LIVER PROFILE:   Recent Labs     06/28/22  1336 06/28/22  1336 06/29/22  0511 06/30/22 0435 07/01/22  0526   AST 15   < > 45* 98* 50*   ALT 14   < > 16 24 22   LIPASE 18  --   --   --   --    BILITOT <0.2   < > 0.3 <0.2 0.3   ALKPHOS 123*   < > 87 101 108*    < > = values in this interval not displayed. PT/INR:   Recent Labs     06/29/22  0511 06/30/22 0435 07/01/22  0526   PROTIME 17.9* 14.0 13.9   INR 1.5 1.1 1.1     APTT:   Recent Labs     06/28/22  1444   APTT 46.2*     UA:No results for input(s): NITRITE, COLORU, PHUR, LABCAST, WBCUA, RBCUA, MUCUS, TRICHOMONAS, YEAST, BACTERIA, CLARITYU, SPECGRAV, LEUKOCYTESUR, UROBILINOGEN, BILIRUBINUR, BLOODU, GLUCOSEU, AMORPHOUS in the last 72 hours.     Invalid input(s): KETONESU    Cultures:  Negative so far  Films:  CXR reviewed by me and it showed hyperinflated, no infiltrate      Assessment: This is a critically ill patient    · Acute blood loss anemia, EGD is negative, possibly intestinal bleed with supratherapeutic INR currently no active bleed  · Non-ST elevation MI  · Nonsustained V. tach, improved with amiodarone  · Acute kidney injury resolved  · Smoking  · Paroxysmal A. fib on Coumadin  · Urine retention  · History of seizure    Recommendation  · Monitor hemoglobin transfuse if less than 7  · Appreciate cardiology, potential plan for left heart cath tomorrow  · Appreciate GI  · INR corrected, resume anticoagulation when okay with GI Target INR 2-3  · Resume Flomax  · Continue to hold blood pressure meds for now for now  · Continue amiodarone  · will try removing Vega after colonoscopy today     I spent 35 min with this patient, greater the 50% of this time was spent in counseling and/or coordinating of care.           Electronically signed by Emma Saldivar MD,  [facility] ,on 7/1/2022 at 7:15 AM numerical 0-10

## 2022-07-02 LAB
ALBUMIN SERPL-MCNC: 3 G/DL (ref 3.5–4.6)
ALP BLD-CCNC: 117 U/L (ref 35–104)
ALT SERPL-CCNC: 21 U/L (ref 0–41)
ANION GAP SERPL CALCULATED.3IONS-SCNC: 11 MEQ/L (ref 9–15)
AST SERPL-CCNC: 35 U/L (ref 0–40)
BASOPHILS ABSOLUTE: 0 K/UL (ref 0–0.2)
BASOPHILS RELATIVE PERCENT: 0.5 %
BILIRUB SERPL-MCNC: <0.2 MG/DL (ref 0.2–0.7)
BLOOD BANK DISPENSE STATUS: NORMAL
BLOOD BANK PRODUCT CODE: NORMAL
BPU ID: NORMAL
BUN BLDV-MCNC: 9 MG/DL (ref 8–23)
CALCIUM SERPL-MCNC: 7.9 MG/DL (ref 8.5–9.9)
CHLORIDE BLD-SCNC: 106 MEQ/L (ref 95–107)
CO2: 22 MEQ/L (ref 20–31)
CREAT SERPL-MCNC: 1 MG/DL (ref 0.7–1.2)
DESCRIPTION BLOOD BANK: NORMAL
EOSINOPHILS ABSOLUTE: 0.3 K/UL (ref 0–0.7)
EOSINOPHILS RELATIVE PERCENT: 4.1 %
GFR AFRICAN AMERICAN: >60
GFR NON-AFRICAN AMERICAN: >60
GLOBULIN: 1.8 G/DL (ref 2.3–3.5)
GLUCOSE BLD-MCNC: 82 MG/DL (ref 70–99)
HCT VFR BLD CALC: 24.4 % (ref 42–52)
HCT VFR BLD CALC: 24.5 % (ref 42–52)
HCT VFR BLD CALC: 25.7 % (ref 42–52)
HCT VFR BLD CALC: 27 % (ref 42–52)
HEMOGLOBIN: 8.3 G/DL (ref 14–18)
HEMOGLOBIN: 8.4 G/DL (ref 14–18)
HEMOGLOBIN: 8.9 G/DL (ref 14–18)
HEMOGLOBIN: 9.1 G/DL (ref 14–18)
INR BLD: 1
LYMPHOCYTES ABSOLUTE: 1.1 K/UL (ref 1–4.8)
LYMPHOCYTES RELATIVE PERCENT: 15.8 %
MAGNESIUM: 2 MG/DL (ref 1.7–2.4)
MCH RBC QN AUTO: 31.6 PG (ref 27–31.3)
MCHC RBC AUTO-ENTMCNC: 34.3 % (ref 33–37)
MCV RBC AUTO: 92.1 FL (ref 80–100)
MONOCYTES ABSOLUTE: 0.7 K/UL (ref 0.2–0.8)
MONOCYTES RELATIVE PERCENT: 10.2 %
NEUTROPHILS ABSOLUTE: 4.9 K/UL (ref 1.4–6.5)
NEUTROPHILS RELATIVE PERCENT: 69.4 %
PDW BLD-RTO: 13.9 % (ref 11.5–14.5)
PLATELET # BLD: 213 K/UL (ref 130–400)
POTASSIUM SERPL-SCNC: 4.1 MEQ/L (ref 3.4–4.9)
PROTHROMBIN TIME: 12.9 SEC (ref 12.3–14.9)
RBC # BLD: 2.65 M/UL (ref 4.7–6.1)
SODIUM BLD-SCNC: 139 MEQ/L (ref 135–144)
TOTAL CK: 237 U/L (ref 0–190)
TOTAL PROTEIN: 4.8 G/DL (ref 6.3–8)
WBC # BLD: 7 K/UL (ref 4.8–10.8)

## 2022-07-02 PROCEDURE — 82550 ASSAY OF CK (CPK): CPT

## 2022-07-02 PROCEDURE — 2700000000 HC OXYGEN THERAPY PER DAY

## 2022-07-02 PROCEDURE — 2580000003 HC RX 258: Performed by: INTERNAL MEDICINE

## 2022-07-02 PROCEDURE — 6370000000 HC RX 637 (ALT 250 FOR IP): Performed by: INTERNAL MEDICINE

## 2022-07-02 PROCEDURE — 2060000000 HC ICU INTERMEDIATE R&B

## 2022-07-02 PROCEDURE — 97116 GAIT TRAINING THERAPY: CPT

## 2022-07-02 PROCEDURE — 85014 HEMATOCRIT: CPT

## 2022-07-02 PROCEDURE — 99233 SBSQ HOSP IP/OBS HIGH 50: CPT | Performed by: INTERNAL MEDICINE

## 2022-07-02 PROCEDURE — 85018 HEMOGLOBIN: CPT

## 2022-07-02 PROCEDURE — 36415 COLL VENOUS BLD VENIPUNCTURE: CPT

## 2022-07-02 PROCEDURE — 80053 COMPREHEN METABOLIC PANEL: CPT

## 2022-07-02 PROCEDURE — 93005 ELECTROCARDIOGRAM TRACING: CPT | Performed by: INTERNAL MEDICINE

## 2022-07-02 PROCEDURE — 85610 PROTHROMBIN TIME: CPT

## 2022-07-02 PROCEDURE — 85025 COMPLETE CBC W/AUTO DIFF WBC: CPT

## 2022-07-02 PROCEDURE — 83735 ASSAY OF MAGNESIUM: CPT

## 2022-07-02 RX ADMIN — AMIODARONE HYDROCHLORIDE 200 MG: 200 TABLET ORAL at 08:24

## 2022-07-02 RX ADMIN — PHENOBARBITAL 129.6 MG: 32.4 TABLET ORAL at 08:25

## 2022-07-02 RX ADMIN — PANTOPRAZOLE SODIUM 40 MG: 40 TABLET, DELAYED RELEASE ORAL at 08:24

## 2022-07-02 RX ADMIN — SODIUM CHLORIDE, PRESERVATIVE FREE 10 ML: 5 INJECTION INTRAVENOUS at 08:25

## 2022-07-02 RX ADMIN — METOPROLOL TARTRATE 12.5 MG: 25 TABLET, FILM COATED ORAL at 21:43

## 2022-07-02 RX ADMIN — Medication 400 MG: at 21:43

## 2022-07-02 RX ADMIN — PANTOPRAZOLE SODIUM 40 MG: 40 TABLET, DELAYED RELEASE ORAL at 16:10

## 2022-07-02 RX ADMIN — PHENOBARBITAL 129.6 MG: 32.4 TABLET ORAL at 21:58

## 2022-07-02 RX ADMIN — AMIODARONE HYDROCHLORIDE 200 MG: 200 TABLET ORAL at 21:43

## 2022-07-02 RX ADMIN — METOPROLOL TARTRATE 25 MG: 25 TABLET, FILM COATED ORAL at 08:24

## 2022-07-02 RX ADMIN — TAMSULOSIN HYDROCHLORIDE 0.4 MG: 0.4 CAPSULE ORAL at 08:24

## 2022-07-02 RX ADMIN — Medication 400 MG: at 08:25

## 2022-07-02 RX ADMIN — SODIUM CHLORIDE, PRESERVATIVE FREE 10 ML: 5 INJECTION INTRAVENOUS at 21:44

## 2022-07-02 ASSESSMENT — ENCOUNTER SYMPTOMS
VOMITING: 0
DIARRHEA: 0
COUGH: 0
NAUSEA: 0
SHORTNESS OF BREATH: 0

## 2022-07-02 ASSESSMENT — PAIN DESCRIPTION - LOCATION
LOCATION: GENERALIZED
LOCATION: GENERALIZED

## 2022-07-02 ASSESSMENT — PAIN SCALES - GENERAL
PAINLEVEL_OUTOF10: 0

## 2022-07-02 NOTE — PROGRESS NOTES
Pulmonary & Critical Care Medicine ICU Progress Note    Subjective:     No overnight events reported. The patient is currently resting comfortably in bed without any specific complaints other than wanting to go home. He denies any shortness of breath or pain.     EXAM:  General: No acute distress, resting comfortably in bed  HEENT: Normocephalic, atraumatic, pupils equal round and reactive to light  Lungs : Clear to auscultation bilaterally, no wheezes, no rales, no rhonchi, no respiratory distress  Heart: Regular rate and rhythm  ABD: Positive bowel sounds, soft, nontender to palpation  Extremities : Warm, dry, no edema noted  Neuro: Awake, alert, oriented x4, no focal motor or sensory deficits appreciated  Skin: No rashes      IV:   sodium chloride      sodium chloride 500 mL (07/01/22 1136)    sodium chloride      sodium chloride      sodium chloride         Vitals:  BP (!) 122/56   Pulse 57   Temp 98.1 °F (36.7 °C) (Oral)   Resp 14   Ht 5' 5\" (1.651 m)   Wt 135 lb (61.2 kg)   SpO2 95%   BMI 22.47 kg/m²          Intake/Output Summary (Last 24 hours) at 7/2/2022 0855  Last data filed at 7/2/2022 0559  Gross per 24 hour   Intake 610 ml   Output 2000 ml   Net -1390 ml       Medications:  Scheduled Meds:   metoprolol tartrate  25 mg Oral BID    sodium chloride flush  5-40 mL IntraVENous 2 times per day    tamsulosin  0.4 mg Oral Daily    sodium chloride flush  5-40 mL IntraVENous 2 times per day    predniSONE  50 mg Oral Once    diphenhydrAMINE  50 mg Oral Once    magnesium oxide  400 mg Oral BID    amiodarone  200 mg Oral BID    pantoprazole  40 mg Oral BID AC    PHENobarbital  129.6 mg Oral BID       Labs:   CBC:   Recent Labs     06/30/22  2133 07/01/22  0018 07/01/22  0526 07/01/22  0526 07/01/22  1520 07/01/22  2319 07/02/22  0516   WBC 8.3  --  5.8  --   --   --  7.0   HGB 9.1*   < > 8.1*   < > 7.9* 8.3* 8.4*   HCT 26.9*   < > 23.7*   < > 23.5* 24.5* 24.4*   MCV 91.7  --  91.9  --   -- --  92.1     --  187  --   --   --  213    < > = values in this interval not displayed. BMP:   Recent Labs     06/30/22  2133 07/01/22  0526 07/02/22  0516    139 139   K 4.3 4.3 4.1   * 110* 106   CO2 21 20 22   BUN 17 12 9   CREATININE 1.01 0.97 1.00     LIVER PROFILE:   Recent Labs     06/30/22  0435 07/01/22  0526 07/02/22  0516   AST 98* 50* 35   ALT 24 22 21   BILITOT <0.2 0.3 <0.2   ALKPHOS 101 108* 117*     PT/INR:   Recent Labs     06/30/22  0435 07/01/22  0526 07/02/22  0500   PROTIME 14.0 13.9 12.9   INR 1.1 1.1 1.0     APTT: No results for input(s): APTT in the last 72 hours. UA:No results for input(s): NITRITE, COLORU, PHUR, LABCAST, WBCUA, RBCUA, MUCUS, TRICHOMONAS, YEAST, BACTERIA, CLARITYU, SPECGRAV, LEUKOCYTESUR, UROBILINOGEN, BILIRUBINUR, BLOODU, GLUCOSEU, AMORPHOUS in the last 72 hours. Invalid input(s): Nina Keita    Radiology:    CXR: 2-view: Results for orders placed during the hospital encounter of 06/28/22    XR CHEST (2 VW)    Narrative  EXAMINATION:  CHEST. CLINICAL HISTORY:  COUGH. COMPARISONS:  4/8/2017. TECHNIQUE:  PA and lateral.    FINDINGS:  Heart size and contour are within normal limits. Pulmonary vascularity is normal. No infiltrate or effusion is seen. There is no evidence of a mass or adenopathy. Lungs appear somewhat hyperinflated. There is deformity of the left shoulder. Impression  PROBABLE COPD. DEFORMITY LEFT SHOULDER. Maude Miller Results for orders placed during the hospital encounter of 04/08/17    XR Chest Standard TWO VW    Narrative  EXAMINATION: Chest 2 views    REASON FOR EXAM:  Cough    FINDINGS: Heart and mediastinum satisfactory. Lung fields mildly hyperinflated but free of active disease. No consolidating infiltrate or effusion seen. Impression  NO ACTIVE DISEASE IN THE CHEST. Portable: No results found for this or any previous visit. Assessment/Plan:    1.  Acute posthemorrhagic anemia-patient did undergo EGD deviously. He had a colonoscopy yesterday with cauterization of 2 AVMs in his colon. There is no signs of recent bleeding noted. Gastroenterology did state that he could have anticoagulation if warranted depending on his risk/benefit ratio. 2. Coronary artery disease/NSTEMI-he did also undergo left heart catheterization yesterday. No stenting was performed at that time. Review of cardiology's note did comment on evaluation for possible CABG. 3. KRYS-resolved. Continue to monitor urine output and creatinine level. Nutrition: Oral diet as tolerated    Prophylaxis: Protonix for GI prophylaxis. Pharmacologic DVT prophylaxis remains on hold given his history of GI bleed. Code Status: Full code    Patient is currently doing well from a critical care standpoint and can be transferred out of the ICU. Once the patient leaves the ICU critical care will sign off at that point.     Electronically signed by Alfredo Fernando DO on 7/2/2022 at 8:55 AM

## 2022-07-02 NOTE — FLOWSHEET NOTE
0745 Assessment complete, see flow sheet. Patient A/O X4, following all commands. Right cath site clean, dry and intact. No bleeding and or hematoma noted. Denies pain and or discomfort. Offers no complaints at this time. 0800 Seen by Dr Lazarus Moats, update given, see orders. 0900 Seen by Dr Paulie Maxwell on morning rounds, update given, see orders. 1145 Vega DCed, emptied for 600cc clear yellow urine, patient tolerated well. 1150 Report called to [place] on [address], update given, questions answered. 1215 Patient transferred via w/chair in stable condition, had no belongings except a cell phone,  and his glasses and those were with him. No changes in assessment noted.

## 2022-07-02 NOTE — PROGRESS NOTES
[facility] OF [facility] [place] Note      Patient: Asif Merritt    Unit/Bed: P254/L738-84  YOB: 1955  MRN: [id number]  Admit Date:  6/28/2022  Hospital Day: 5    Rounding Date: 7/2/2022    Rounding Cardiologist:  Bud Kaur MD    PRIMARY Cardiologist:  Tiffany Lara    Subjective Complaint:   Denies any chest pain with exertion or at rest, palpitations, syncope, or edema. .     Physical Examination:     BP (!) 110/51   Pulse 54   Temp 97.5 °F (36.4 °C) (Oral)   Resp 15   Ht 5' 5\" (1.651 m)   Wt 135 lb (61.2 kg)   SpO2 99%   BMI 22.47 kg/m²         Intake/Output Summary (Last 24 hours) at 7/2/2022 1342  Last data filed at 7/2/2022 1200  Gross per 24 hour   Intake 600 ml   Output 2250 ml   Net -1650 ml                  Asif Merritt examined at bedside in in no apparent distress and cooperative. Focused exam reveals:     Cardiac: Heart regular rate and rhythm     Lungs:  clear to auscultation bilaterally- no wheezes, rales or rhonchi, normal air movement, no respiratory distress    Extremities:   No edema, right radial access site without hematoma or bleeding. Bilateral carotid bruit. EKG: Sinus bradycardia 57,LAE,QTC 470msec. LABS:   CBC:   Recent Labs     06/30/22 2133 07/01/22  0018 07/01/22  0526 07/01/22  0526 07/01/22  1520 07/01/22  2319 07/02/22  0516   WBC 8.3  --  5.8  --   --   --  7.0   HGB 9.1*   < > 8.1*   < > 7.9* 8.3* 8.4*     --  187  --   --   --  213    < > = values in this interval not displayed. BMP:    Recent Labs     06/30/22 2133 07/01/22  0526 07/02/22  0516    139 139   K 4.3 4.3 4.1   * 110* 106   CO2 21 20 22   BUN 17 12 9   CREATININE 1.01 0.97 1.00   GLUCOSE 102* 90 82              Troponin: No results for input(s): TROPONINT in the last 72 hours. BNP: No results for input(s): PROBNP in the last 72 hours.    INR:   Recent Labs     06/30/22  0435 07/01/22  0526 07/02/22  0500   INR 1.1 1.1 1.0      Mg:   Recent Labs 07/02/22  0516   MG 2.0       Cardiac Testing:      Summary   Left ventricular ejection fraction is visually estimated at 65-70%. DUST with mild increase LVOT velocity due to hyperdyanmic LV   Concentric LVH   Normal right ventricle structure and function. RVSP calculated at >70%, however TR jet not optimally interogated and RV   not dilated or hypertrophic. Doppler study needs repeated. Normal mitral valve structure   2+ MR   Normal aortic valve structure and function. Normal tricuspid valve structure and function. Mild-to-moderate tricuspid regurgitation. Normal pulmonic valve structure and function. Normal left atrium. RVSP 80 to 85mm Hg      Assessment:  Non q mi  GI bleeding   Hypotension--improved  High risk med . Earlene Rasvladislav Amiodarone  CAD : needs revascularisation when stable  ? Compliance   PAF  [id number]   Anticoagulation and antiplatelet therapy on hold per GI recommendations  Acute blood loss anemia  Severe pulmonary hypertension   Carotid disease s/p right CEA  Smoker         Plan:  Decrease betablockers  Coronary angiograms to [facility]  Pulmonary consult for pulmonary hypertension  Life Vest (if he qualifies and complies)  Keep potassium greater than 4.o and Magnesium greater than 2.0  Start aspirin today. Anticipate discharge tomorrow although patient would very much like to go home today. Outpatient follow-up in 1 week. Definitive revascularization plans will be based on CT surgery input. Have discussed with Dr. Jeffrey Roldan. We will also discussed with interventional colleagues. The lesions are both amenable to intervention. However if patient has to stop antiplatelet therapy for any reason, there is extreme high risk of mortality from stent thrombosis. Patient has nondominant RCA.   Electronically signed by Rex Dale MD on 7/2/2022 at 1:42 PM

## 2022-07-02 NOTE — PROGRESS NOTES
hyponatremia and hyperkalemia  5) ho seizures  6) diarhea  7) elevated trop non cardiac causes  8) Rhabdo  9) Vtach  Assessment & Plan    6/29: Patient was transferred to ICU for hypotension and A. fib with RVR. Follow cardiology recommendation, patient is not on pressors currently. Follow-up GI about endoscopy. Status post unit of PRBC and fresh frozen plasma. For coagulopathy secondary to extrinsic factors, transfuse to keep hemoglobin over 7. No other complaints. Hyponatremia and hyperkalemia resolved  6/30: Still waiting for report of endoscopy from [facility], spoke with nursing and/. Patient went for possible cardiac cath tomorrow. Advance diet if is okay with GI. Status post EGD did not show active bleed. Patient had to straight cath due to unable to urinate due to most likely anesthesia yesterday during EGD will monitor. Bladder scan today spoke with nursing. Started on flomax. diarhea resolved, KRYS resolved,   7/1: Patient received another unit of blood yesterday. Plan for colonoscopy today and later on coronary angiography. Records from [facility] was reviewed. Spoke with nursing to let GI know about that polyps and a circumferential mass that was found on the colonoscopy and [facility]. Patient says that the biopsy was negative for cancer. 7/2: Status post colonoscopy and cardiac cath. No stent placement. Remove Vega today for voiding trial.  Spoke with nursing. Colonoscopy showed AVM status post ablation. Anticoagulation as per cardiology. Renal failure resolved. Diarrhea resolved. Patient can be discharged either today or tomorrow based on clinical course.  No overnight events, c/w flomax, spoke with nursing about the care  Electronically signed by Rafael Reyes MD on 6/29/22 at 11:32 AM EDT

## 2022-07-02 NOTE — PROGRESS NOTES
Shift Summary     Patient had quiet night see flow sheets for assessment and MAR for meds. Gary to ELIZABETH. Right radial cath site dressing intact. No bruising. Strong pulse. Patient is on room air. Drinking coffee and water well. Side rails up times 2 call bell within reach.  Repositioned self during the night

## 2022-07-02 NOTE — PROGRESS NOTES
Physical Therapy Med Surg Daily Treatment Note  Facility/Department: [facility]  Room: W190/W190-       NAME: James Kraus  :  (75 y.o.)  MRN: [id number]  CODE STATUS: Full Code    Date of Service: 2022    Patient Diagnosis(es): KRYS (acute kidney injury) ([facility] [address] [N17.9]  Gastrointestinal hemorrhage, unspecified gastrointestinal hemorrhage type [K92.2]   Chief Complaint   Patient presents with    Illness     pt c/o diarrhea, loss of appetite, BLE pain, and trouble sleeping     Patient Active Problem List    Diagnosis Date Noted    AVM (arteriovenous malformation) of colon     Gastrointestinal hemorrhage     KRYS (acute kidney injury) ([facility] [address] 2022    Acute cholecystitis with chronic cholecystitis 04/15/2021    Chronic cholecystitis with calculus 2021        Past Medical History:   Diagnosis Date    KRYS (acute kidney injury) ([facility] [address] 2022    Anemia     Back pain     chronic    CAD (coronary artery disease)     Closed fracture of neck of left femur (HCC)     Epilepsy ([facility] [address]     Hyperlipidemia     Hypertension     Paroxysmal A-fib ([address]     Shingles      Past Surgical History:   Procedure Laterality Date    CAROTID ENDARTERECTOMY      CHOLECYSTECTOMY, LAPAROSCOPIC N/A 2021    LAPAROSCOPIC CHOLECYSTECTOMY  WITH CHOLANGIOGRAMS performed by Alvaro Barnhart MD at [address] [place] [address] COLONOSCOPY N/A 2022    COLONOSCOPY WITH INTERVENTION performed by Jason Israel MD at [address] [place] [address] ENDOSCOPY N/A 2022    EGD DIAGNOSTIC ONLY performed by Jason Israel MD at [facility]            Restrictions:  Restrictions/Precautions: Up as Tolerated    SUBJECTIVE:   Subjective: \"I'm doing good, ready to go home. \"    Pain  Pain: denies pre/post tx      OBJECTIVE:        Bed mobility  Supine to Sit: Independent  Sit to Supine: Independent    Transfers  Sit to Stand: Independent  Stand to sit: Independent    Ambulation  Surface: level tile  Device: No Device  Assistance: Independent  Quality of Gait: decreased gait speed  Distance: 300'    Stairs/Curb  Stairs?: Yes  Stairs  # Steps : 12  Stairs Height: 6\"  Rails: Right ascending  Device: No Device  Assistance: Modified independent   Comment: completes with reciprocal pattern, no LOB. tolerates well. Activity Tolerance  Activity Tolerance: Patient tolerated treatment well          ASSESSMENT   Assessment: pt met goals, mobilizing at independent level including flight of stairs. Discharge Recommendations:  Continue to assess pending progress         Goals  Long Term Goals  Long term goal 1: Bed mobility with indep met  Long term goal 2: Functional transfers with indep met  Long term goal 3: Amb 300ft with indep met  Long term goal 4: 12 steps with handrail and indep to enter/exit home Met  Patient Goals   Patient goals : to go home    PLAN    Plan: 2-3 times per week  Safety Devices  Type of Devices: Call light within reach,Nurse notified     [facility] (6 CLICK) [address] Mobility Raw Score : 24      Therapy Time   Individual   Time In 1530   Time Out 1540   Minutes 10      Gait: 8  BM/trsf: 2        Danilo Ribeiro PTA, 07/02/22 at 3:52 PM         Definitions for assistance levels  Independent = pt does not require any physical supervision or assistance from another person for activity completion. Device may be needed.   Stand by assistance = pt requires verbal cues or instructions from another person, close to but not touching, to perform the activity  Minimal assistance= pt performs 75% or more of the activity; assistance is required to complete the activity  Moderate assistance= pt performs 50% of the activity; assistance is required to complete the activity  Maximal assistance = pt performs 25% of the activity; assistance is required to complete the activity  Dependent = pt requires total physical assistance to accomplish the task

## 2022-07-03 VITALS
OXYGEN SATURATION: 100 % | TEMPERATURE: 97.9 F | HEIGHT: 65 IN | SYSTOLIC BLOOD PRESSURE: 113 MMHG | BODY MASS INDEX: 22.49 KG/M2 | DIASTOLIC BLOOD PRESSURE: 42 MMHG | RESPIRATION RATE: 16 BRPM | HEART RATE: 53 BPM | WEIGHT: 135 LBS

## 2022-07-03 PROBLEM — D62 ACUTE BLOOD LOSS ANEMIA: Status: ACTIVE | Noted: 2022-07-03

## 2022-07-03 PROBLEM — I21.4 NON-ST ELEVATION MI (NSTEMI) (HCC): Status: ACTIVE | Noted: 2022-07-03

## 2022-07-03 PROBLEM — I47.29 NSVT (NONSUSTAINED VENTRICULAR TACHYCARDIA) (HCC): Status: ACTIVE | Noted: 2022-07-03

## 2022-07-03 PROBLEM — I25.10 CORONARY ARTERY DISEASE INVOLVING NATIVE CORONARY ARTERY: Status: ACTIVE | Noted: 2022-07-03

## 2022-07-03 PROBLEM — I48.0 PAF (PAROXYSMAL ATRIAL FIBRILLATION) (HCC): Status: ACTIVE | Noted: 2022-07-03

## 2022-07-03 PROBLEM — I65.23 BILATERAL CAROTID ARTERY STENOSIS: Status: ACTIVE | Noted: 2022-07-03

## 2022-07-03 LAB
ALBUMIN SERPL-MCNC: 3.2 G/DL (ref 3.5–4.6)
ALP BLD-CCNC: 115 U/L (ref 35–104)
ALT SERPL-CCNC: 16 U/L (ref 0–41)
ANION GAP SERPL CALCULATED.3IONS-SCNC: 9 MEQ/L (ref 9–15)
AST SERPL-CCNC: 22 U/L (ref 0–40)
BASOPHILS ABSOLUTE: 0 K/UL (ref 0–0.2)
BASOPHILS RELATIVE PERCENT: 0.5 %
BILIRUB SERPL-MCNC: <0.2 MG/DL (ref 0.2–0.7)
BUN BLDV-MCNC: 9 MG/DL (ref 8–23)
CALCIUM SERPL-MCNC: 8.6 MG/DL (ref 8.5–9.9)
CHLORIDE BLD-SCNC: 103 MEQ/L (ref 95–107)
CO2: 26 MEQ/L (ref 20–31)
CREAT SERPL-MCNC: 1.13 MG/DL (ref 0.7–1.2)
EKG ATRIAL RATE: 77 BPM
EKG P AXIS: 57 DEGREES
EKG P-R INTERVAL: 172 MS
EKG Q-T INTERVAL: 388 MS
EKG QRS DURATION: 84 MS
EKG QTC CALCULATION (BAZETT): 439 MS
EKG R AXIS: 23 DEGREES
EKG T AXIS: 49 DEGREES
EKG VENTRICULAR RATE: 77 BPM
EOSINOPHILS ABSOLUTE: 0.3 K/UL (ref 0–0.7)
EOSINOPHILS RELATIVE PERCENT: 4.9 %
GFR AFRICAN AMERICAN: >60
GFR NON-AFRICAN AMERICAN: >60
GLOBULIN: 1.9 G/DL (ref 2.3–3.5)
GLUCOSE BLD-MCNC: 82 MG/DL (ref 70–99)
HCT VFR BLD CALC: 24.9 % (ref 42–52)
HCT VFR BLD CALC: 26 % (ref 42–52)
HEMOGLOBIN: 8.4 G/DL (ref 14–18)
HEMOGLOBIN: 8.7 G/DL (ref 14–18)
INR BLD: 1
LYMPHOCYTES ABSOLUTE: 1.5 K/UL (ref 1–4.8)
LYMPHOCYTES RELATIVE PERCENT: 20.6 %
MCH RBC QN AUTO: 31.1 PG (ref 27–31.3)
MCHC RBC AUTO-ENTMCNC: 33.7 % (ref 33–37)
MCV RBC AUTO: 92.3 FL (ref 80–100)
MONOCYTES ABSOLUTE: 0.6 K/UL (ref 0.2–0.8)
MONOCYTES RELATIVE PERCENT: 8.4 %
NEUTROPHILS ABSOLUTE: 4.6 K/UL (ref 1.4–6.5)
NEUTROPHILS RELATIVE PERCENT: 65.6 %
PDW BLD-RTO: 14.1 % (ref 11.5–14.5)
PLATELET # BLD: 252 K/UL (ref 130–400)
POTASSIUM SERPL-SCNC: 4.5 MEQ/L (ref 3.4–4.9)
PROTHROMBIN TIME: 13 SEC (ref 12.3–14.9)
RBC # BLD: 2.69 M/UL (ref 4.7–6.1)
SODIUM BLD-SCNC: 138 MEQ/L (ref 135–144)
TOTAL CK: 154 U/L (ref 0–190)
TOTAL PROTEIN: 5.1 G/DL (ref 6.3–8)
WBC # BLD: 7.1 K/UL (ref 4.8–10.8)

## 2022-07-03 PROCEDURE — 80053 COMPREHEN METABOLIC PANEL: CPT

## 2022-07-03 PROCEDURE — 85018 HEMOGLOBIN: CPT

## 2022-07-03 PROCEDURE — 6370000000 HC RX 637 (ALT 250 FOR IP): Performed by: INTERNAL MEDICINE

## 2022-07-03 PROCEDURE — 85014 HEMATOCRIT: CPT

## 2022-07-03 PROCEDURE — 2580000003 HC RX 258: Performed by: INTERNAL MEDICINE

## 2022-07-03 PROCEDURE — 36415 COLL VENOUS BLD VENIPUNCTURE: CPT

## 2022-07-03 PROCEDURE — 85025 COMPLETE CBC W/AUTO DIFF WBC: CPT

## 2022-07-03 PROCEDURE — 85610 PROTHROMBIN TIME: CPT

## 2022-07-03 PROCEDURE — 82550 ASSAY OF CK (CPK): CPT

## 2022-07-03 RX ORDER — NITROGLYCERIN 0.4 MG/1
TABLET SUBLINGUAL
Qty: 25 TABLET | Refills: 3 | Status: SHIPPED | OUTPATIENT
Start: 2022-07-03

## 2022-07-03 RX ORDER — PANTOPRAZOLE SODIUM 40 MG/1
TABLET, DELAYED RELEASE ORAL
Qty: 45 TABLET | Refills: 3 | Status: SHIPPED | OUTPATIENT
Start: 2022-07-03

## 2022-07-03 RX ORDER — MAGNESIUM OXIDE 400 MG/1
400 TABLET ORAL DAILY
Qty: 30 TABLET | Refills: 3 | Status: SHIPPED | OUTPATIENT
Start: 2022-07-03

## 2022-07-03 RX ORDER — TAMSULOSIN HYDROCHLORIDE 0.4 MG/1
0.4 CAPSULE ORAL DAILY
Qty: 30 CAPSULE | Refills: 3 | Status: SHIPPED | OUTPATIENT
Start: 2022-07-03

## 2022-07-03 RX ORDER — AMIODARONE HYDROCHLORIDE 200 MG/1
200 TABLET ORAL 2 TIMES DAILY
Qty: 30 TABLET | Refills: 0 | Status: SHIPPED | OUTPATIENT
Start: 2022-07-03

## 2022-07-03 RX ORDER — PHENOBARBITAL 32.4 MG/1
129.6 TABLET ORAL 2 TIMES DAILY
Qty: 240 TABLET | Refills: 1 | Status: SHIPPED | OUTPATIENT
Start: 2022-07-03 | End: 2022-08-02

## 2022-07-03 RX ORDER — ASPIRIN 81 MG/1
81 TABLET ORAL DAILY
Status: DISCONTINUED | OUTPATIENT
Start: 2022-07-03 | End: 2022-07-03 | Stop reason: HOSPADM

## 2022-07-03 RX ADMIN — Medication 400 MG: at 08:24

## 2022-07-03 RX ADMIN — ASPIRIN 81 MG: 81 TABLET, COATED ORAL at 08:22

## 2022-07-03 RX ADMIN — PHENOBARBITAL 129.6 MG: 32.4 TABLET ORAL at 08:22

## 2022-07-03 RX ADMIN — SODIUM CHLORIDE, PRESERVATIVE FREE 10 ML: 5 INJECTION INTRAVENOUS at 08:22

## 2022-07-03 RX ADMIN — METOPROLOL TARTRATE 12.5 MG: 25 TABLET, FILM COATED ORAL at 08:22

## 2022-07-03 RX ADMIN — AMIODARONE HYDROCHLORIDE 200 MG: 200 TABLET ORAL at 11:24

## 2022-07-03 RX ADMIN — TAMSULOSIN HYDROCHLORIDE 0.4 MG: 0.4 CAPSULE ORAL at 08:22

## 2022-07-03 RX ADMIN — PANTOPRAZOLE SODIUM 40 MG: 40 TABLET, DELAYED RELEASE ORAL at 15:41

## 2022-07-03 RX ADMIN — PANTOPRAZOLE SODIUM 40 MG: 40 TABLET, DELAYED RELEASE ORAL at 06:36

## 2022-07-03 ASSESSMENT — PAIN DESCRIPTION - LOCATION
LOCATION: GENERALIZED
LOCATION: GENERALIZED

## 2022-07-03 ASSESSMENT — PAIN SCALES - GENERAL
PAINLEVEL_OUTOF10: 0

## 2022-07-03 NOTE — PROGRESS NOTES
[facility] [place] Note      Patient: Elizabet He    Unit/Bed: W675/R184-76  YOB: 1955  MRN: [id number]  Admit Date:  6/28/2022  Hospital Day: 6    Rounding Date: 7/3/2022    Rounding Cardiologist:  Jacques Andujar MD    PRIMARY Cardiologist:  Gordon Dwyer    Subjective Complaint:   Denies any chest pain with exertion or at rest, palpitations, syncope, or edema. .     Physical Examination:     BP (!) 150/59   Pulse 60   Temp 97.9 °F (36.6 °C) (Oral)   Resp 16   Ht 5' 5\" (1.651 m)   Wt 135 lb (61.2 kg)   SpO2 100%   BMI 22.47 kg/m²         Intake/Output Summary (Last 24 hours) at 7/3/2022 1405  Last data filed at 7/3/2022 1326  Gross per 24 hour   Intake 1440 ml   Output 500 ml   Net 940 ml                  Di He examined at bedside in in no apparent distress and cooperative. Focused exam reveals:     Cardiac: Heart regular rate and rhythm     Lungs:  clear to auscultation bilaterally- no wheezes, rales or rhonchi, normal air movement, no respiratory distress    Extremities:   No edema, right radial access site without hematoma or bleeding. Bilateral carotid bruit. EKG: Sinus bradycardia 57,LAE,QTC 470msec. LABS:   CBC:   Recent Labs     07/01/22  0526 07/01/22  1520 07/02/22  0516 07/02/22  1502 07/02/22  2300 07/03/22  0600 07/03/22  0716   WBC 5.8  --  7.0  --   --  7.1  --    HGB 8.1*   < > 8.4*   < > 8.9* 8.4* 8.7*     --  213  --   --  252  --     < > = values in this interval not displayed. BMP:    Recent Labs     07/01/22  0526 07/02/22  0516 07/03/22  0600    139 138   K 4.3 4.1 4.5   * 106 103   CO2 20 22 26   BUN 12 9 9   CREATININE 0.97 1.00 1.13   GLUCOSE 90 82 82              Troponin: No results for input(s): TROPONINT in the last 72 hours. BNP: No results for input(s): PROBNP in the last 72 hours.    INR:   Recent Labs     07/01/22  0526 07/02/22  0500 07/03/22  0600   INR 1.1 1.0 1.0      Mg:   Recent Labs 07/02/22  0516   MG 2.0       Cardiac Testing:      Summary   Left ventricular ejection fraction is visually estimated at 65-70%. DUST with mild increase LVOT velocity due to hyperdyanmic LV   Concentric LVH   Normal right ventricle structure and function. RVSP calculated at >70%, however TR jet not optimally interogated and RV   not dilated or hypertrophic. Doppler study needs repeated. Normal mitral valve structure   2+ MR   Normal aortic valve structure and function. Normal tricuspid valve structure and function. Mild-to-moderate tricuspid regurgitation. Normal pulmonic valve structure and function. Normal left atrium. RVSP 80 to 85mm Hg      Assessment:  Non q mi  GI bleeding   Hypotension--improved  High risk med . Pinky Angles Amiodarone  CAD : needs revascularisation when stable  ? Compliance   PAF  [id number]   Anticoagulation and antiplatelet therapy on hold per GI recommendations  Acute blood loss anemia  Severe pulmonary hypertension   Carotid disease s/p right CEA  Smoker   Sinus bradycardia       Plan:  Decreased  betablockers  Coronary angiograms to [facility]  Pulmonary consult for pulmonary hypertension  Pt did not meet criteria for Life Vest   Keep potassium greater than 4.o and Magnesium greater than 2.0  Tolerated ASA     Outpatient follow-up in 1 week. Definitive revascularization plans will be based on CT surgery input. Have discussed with Dr. Salvador Eugene. We will also discussed with interventional colleagues. The lesions are both amenable to intervention. However if patient has to stop antiplatelet therapy for any reason, there is extreme high risk of mortality from stent thrombosis. Patient has nondominant RCA.   Electronically signed by Tamica Chowdary MD on 7/3/2022 at 2:05 PM     Discussed DC with pts JACY Barlow  Precriptions signed

## 2022-07-03 NOTE — PLAN OF CARE
Problem: Discharge Planning  Goal: Discharge to home or other facility with appropriate resources  Outcome: Progressing     Problem: Safety - Adult  Goal: Free from fall injury  Outcome: Progressing     Problem: ABCDS Injury Assessment  Goal: Absence of physical injury  Outcome: Progressing     Problem: Pain  Goal: Verbalizes/displays adequate comfort level or baseline comfort level  Outcome: Progressing     Problem: Anxiety  Goal: Will report anxiety at manageable levels  Outcome: Progressing     Problem: Decision Making  Goal: Pt/Family able to effectively weigh alternatives and participate in decision making related to treatment and care  Outcome: Progressing     Problem: Behavior  Goal: Pt/Family maintain appropriate behavior and adhere to behavioral management agreement, if implemented  Outcome: Progressing     Problem: Skin/Tissue Integrity  Goal: Absence of new skin breakdown  Outcome: Progressing

## 2022-07-03 NOTE — DISCHARGE SUMMARY
Discharge Summary    Date: 7/3/2022  Patient Name: Rajeev Saavedra YOB: 1955 Age: 75 y.o. Admit Date: 6/28/2022  Discharge Date: 7/3/2022  Discharge Condition: [address]    Admission Diagnosis  KRYS (acute kidney injury) ([facility] [address] (N17.9); Gastrointestinal hemorrhage, unspecified gastrointestinal hemorrhage type (K92.2)     Discharge Diagnosis  Principal Problem: KRYS (acute kidney injury) (HCC)Active Problems: Gastrointestinal hemorrhage AVM (arteriovenous malformation) of colonResolved Problems: * No resolved hospital problems. [facility] Stay  Narrative of Hospital Course:  Patient comes in with GI bleed and supratherapeutic INR secondary to Coumadin. Received multiple blood transfusion and FFP. Status post EGD and colonoscopy. Showing AVMs with successful ablation with APC in the ascending colon. Status post cardiac cath no stent placement but showing coronary disease. Cardiology recommended to start aspirin for now. Patient is to follow-up with GI as outpatient for possible capsule endoscopy to rule out AVM in the small bowel. Patient stable upon discharge. No bleeding. Hemoglobin stable. Acute kidney injury resolved. Consultants:  IP CONSULT TO HOSPITALISTIP CONSULT TO GIIP CONSULT TO CARDIOLOGYIP CONSULT TO CRITICAL CAREIP CONSULT TO CARDIOLOGYIP CONSULT TO CARDIOLOGYIP CONSULT TO CARDIOLOGY    Surgeries/procedures Performed:       Treatments:            Discharge Plan/Disposition:  Home    Hospital/Incidental Findings Requiring Follow Up:    Patient Instructions:    Diet:    Activity:  For number of days (if applicable): Other Instructions:    Provider Follow-Up:   No follow-ups on file. Significant Diagnostic Studies:    Recent Labs:  Admission on 06/28/2022No results displayed because visit has over 200 results. ------------    Radiology last 7 days:  XR CHEST (2 VW)Result Date: 6/28/2022PROBABLE COPD. DEFORMITY LEFT SHOULDER. [address] Date: 6/29/2022SYMMETRIC KIDNEYS. NO ULTRASOUND SIGNS OF HYDRONEPHROSIS AS QUESTIONED CLINICALLY. ADDITIONAL RENAL FINDINGS AS DETAILED ABOVE. BLADDER WALL THICKENING/TRABECULATION AND A SMALL BLADDER WALL DIVERTICULUM. INCOMPLETE PELVIC ASSESSMENT IN THIS PATIENT WHO COULD NOT TOLERATE BLADDER DISTENTION. RECOMMEND REPEAT PELVIC ULTRASOUND IF CLINICALLY INDICATED      Pending Labs   Order Current Status  Hemoglobin and Hematocrit In process      Discharge Medications    Current Discharge Medication ListSTART taking these medicationsmetoprolol tartrate (LOPRESSOR) 25 MG tabletTake 0.5 tablets by mouth 2 times dailyQty: 60 tablet Refills: 3pantoprazole (PROTONIX) 40 MG tabletPlease take 1 tablet twice daily x 2 weeks, then once daily continousQty: 45 tablet Refills: 3tamsulosin (FLOMAX) 0.4 MG capsuleTake 1 capsule by mouth dailyQty: 30 capsule Refills: 3    Current Discharge Medication ListCONTINUE these medications which have CHANGEDPHENobarbital (LUMINAL) 32.4 MG tabletTake 4 tablets by mouth 2 times daily for 30 days. Qty: 240 tablet Refills: 1Associated Diagnoses:Seizures ([facility] [address]    Current Discharge Medication ListCONTINUE these medications which have NOT CHANGEDaspirin 81 MG EC tabletTake 81 mg by mouth dailyatorvastatin (LIPITOR) 80 MG tabletTake 80 mg by mouth at bedtimeferrous sulfate (IRON 325) 325 (65 Fe) MG tabletTake 325 mg by mouth dailyCholecalciferol 50 MCG (2000 UT) TABSTake 2,000 Units by mouth dailylisinopril (PRINIVIL;ZESTRIL) 10 MG tabletTake by mouth daily    Current Discharge Medication ListSTOP taking these medicationsdicyclomine (BENTYL) 10 MG capsuleComments:Reason for Stopping:clopidogrel (PLAVIX) 75 MG tabletComments:Reason for Stopping:ibuprofen (ADVIL;MOTRIN) 800 MG tabletComments:Reason for Stopping:    Time Spent on Discharge:E] minutes were spent in patient examination, evaluation, counseling as well as medication reconciliation, prescriptions for required medications, discharge plan, and follow up.     Electronically signed by Sunday Sanon MD on 7/3/22 at 9:18 AM EDT   overtime on dc summary was 45 min

## 2022-07-03 NOTE — CARE COORDINATION
Met with patient at bedside. D/C plan remains to go home with girlfriend. Denies needs for [facility]. Received call from Hair  with [address] vest. Per Hair , patient's Medicare will not cover the Life Vest because of high ejection fraction. Per Hair , ejection fraction needs to be 35% or lower, and/or show Sustained VT. Updated bedside nurse. Bedside nurse to notify cardiology. CM/SW to continue to follow for d/c planning needs. Second IMM reviewed with patient. Patient verbalizes understanding. Copy placed on chart.

## 2022-07-03 NOTE — PROGRESS NOTES
Patient given discharge instructions; verbalized understanding of new RX's, medications, purpose, time, and follow up care. Advised to call the Dr. with any questions or concerns. Follow up with Dr. Esvin Vaughn. Jer Level 1 week; patient will call if office does not call them regarding time. Patient/significant other deny any questions or concerns. Aware of follow up lab (requisitions given)/medications sent to pharmacy/and new RX's. No questions or concerns voiced at this time. Patient left via wc to home with significant other; NCP deleted. Stable.

## 2022-07-03 NOTE — PLAN OF CARE
Problem: Discharge Planning  Goal: Discharge to home or other facility with appropriate resources  7/3/2022 0640 by Jose Robertson RN  Outcome: Progressing  7/3/2022 0521 by Jose Robertson RN  Outcome: Progressing     Problem: Safety - Adult  Goal: Free from fall injury  7/3/2022 0640 by Jose Robertson RN  Outcome: Hurtis Sensor  7/3/2022 0521 by Jose Robertson RN  Outcome: Progressing     Problem: ABCDS Injury Assessment  Goal: Absence of physical injury  7/3/2022 0640 by Jose Robertson RN  Outcome: Progressing  Flowsheets (Taken 7/3/2022 8301)  Absence of Physical Injury: Implement safety measures based on patient assessment  7/3/2022 0521 by Jose Robertson RN  Outcome: Progressing     Problem: Pain  Goal: Verbalizes/displays adequate comfort level or baseline comfort level  7/3/2022 0640 by Jose Robertson RN  Outcome: Progressing  7/3/2022 0521 by Jose Robertson RN  Outcome: Progressing     Problem: Anxiety  Goal: Will report anxiety at manageable levels  7/3/2022 0640 by Jose Robertson RN  Outcome: Progressing  7/3/2022 0521 by Jose Robertson RN  Outcome: Progressing     Problem: Decision Making  Goal: Pt/Family able to effectively weigh alternatives and participate in decision making related to treatment and care  7/3/2022 0640 by Jose Robertson RN  Outcome: Progressing  7/3/2022 0521 by Jose Robertson RN  Outcome: Progressing     Problem: Behavior  Goal: Pt/Family maintain appropriate behavior and adhere to behavioral management agreement, if implemented  7/3/2022 0640 by Jose Robertson RN  Outcome: Progressing  7/3/2022 0521 by Jose Robertson RN  Outcome: Progressing     Problem: Skin/Tissue Integrity  Goal: Absence of new skin breakdown  7/3/2022 0640 by Jose Robertson RN  Outcome: Progressing  7/3/2022 0521 by Jose Robertson RN  Outcome: Progressing

## 2022-07-04 PROCEDURE — 6360000004 HC RX CONTRAST MEDICATION: Performed by: INTERNAL MEDICINE

## 2022-07-04 RX ADMIN — IOPAMIDOL 40 ML: 612 INJECTION, SOLUTION INTRAVENOUS at 10:00

## 2022-07-05 ENCOUNTER — HOSPITAL ENCOUNTER (EMERGENCY)
Age: 67
Discharge: HOME | End: 2022-07-05
Payer: MEDICARE

## 2022-07-05 ENCOUNTER — APPOINTMENT (OUTPATIENT)
Dept: CT IMAGING | Age: 67
End: 2022-07-05
Payer: MEDICARE

## 2022-07-05 VITALS
WEIGHT: 130 LBS | RESPIRATION RATE: 18 BRPM | TEMPERATURE: 98.3 F | OXYGEN SATURATION: 100 % | HEART RATE: 59 BPM | HEIGHT: 65 IN | BODY MASS INDEX: 21.66 KG/M2 | DIASTOLIC BLOOD PRESSURE: 48 MMHG | SYSTOLIC BLOOD PRESSURE: 120 MMHG

## 2022-07-05 DIAGNOSIS — R42 DIZZINESS: Primary | ICD-10-CM

## 2022-07-05 DIAGNOSIS — I95.2 HYPOTENSION DUE TO DRUGS: ICD-10-CM

## 2022-07-05 LAB
ALBUMIN SERPL-MCNC: 3.4 G/DL (ref 3.5–4.6)
ALP BLD-CCNC: 142 U/L (ref 35–104)
ALT SERPL-CCNC: 14 U/L (ref 0–41)
ANION GAP SERPL CALCULATED.3IONS-SCNC: 8 MEQ/L (ref 9–15)
AST SERPL-CCNC: 19 U/L (ref 0–40)
BASOPHILS ABSOLUTE: 0 K/UL (ref 0–0.2)
BASOPHILS RELATIVE PERCENT: 0.7 %
BILIRUB SERPL-MCNC: <0.2 MG/DL (ref 0.2–0.7)
BUN BLDV-MCNC: 15 MG/DL (ref 8–23)
CALCIUM SERPL-MCNC: 8.4 MG/DL (ref 8.5–9.9)
CHLORIDE BLD-SCNC: 103 MEQ/L (ref 95–107)
CO2: 28 MEQ/L (ref 20–31)
CREAT SERPL-MCNC: 1.61 MG/DL (ref 0.7–1.2)
EKG ATRIAL RATE: 57 BPM
EKG ATRIAL RATE: 59 BPM
EKG ATRIAL RATE: 62 BPM
EKG ATRIAL RATE: 64 BPM
EKG ATRIAL RATE: 66 BPM
EKG P AXIS: 42 DEGREES
EKG P AXIS: 43 DEGREES
EKG P AXIS: 49 DEGREES
EKG P AXIS: 52 DEGREES
EKG P AXIS: 53 DEGREES
EKG P-R INTERVAL: 182 MS
EKG P-R INTERVAL: 186 MS
EKG P-R INTERVAL: 186 MS
EKG P-R INTERVAL: 192 MS
EKG P-R INTERVAL: 194 MS
EKG Q-T INTERVAL: 434 MS
EKG Q-T INTERVAL: 436 MS
EKG Q-T INTERVAL: 438 MS
EKG QRS DURATION: 76 MS
EKG QRS DURATION: 80 MS
EKG QRS DURATION: 80 MS
EKG QRS DURATION: 82 MS
EKG QRS DURATION: 84 MS
EKG QTC CALCULATION (BAZETT): 424 MS
EKG QTC CALCULATION (BAZETT): 433 MS
EKG QTC CALCULATION (BAZETT): 442 MS
EKG QTC CALCULATION (BAZETT): 447 MS
EKG QTC CALCULATION (BAZETT): 457 MS
EKG R AXIS: 0 DEGREES
EKG R AXIS: 15 DEGREES
EKG R AXIS: 2 DEGREES
EKG R AXIS: 5 DEGREES
EKG R AXIS: 6 DEGREES
EKG T AXIS: 17 DEGREES
EKG T AXIS: 23 DEGREES
EKG T AXIS: 32 DEGREES
EKG T AXIS: 34 DEGREES
EKG T AXIS: 44 DEGREES
EKG VENTRICULAR RATE: 57 BPM
EKG VENTRICULAR RATE: 59 BPM
EKG VENTRICULAR RATE: 62 BPM
EKG VENTRICULAR RATE: 64 BPM
EKG VENTRICULAR RATE: 66 BPM
EOSINOPHILS ABSOLUTE: 0.4 K/UL (ref 0–0.7)
EOSINOPHILS RELATIVE PERCENT: 6.4 %
GFR AFRICAN AMERICAN: 51.9
GFR NON-AFRICAN AMERICAN: 42.9
GLOBULIN: 2.1 G/DL (ref 2.3–3.5)
GLUCOSE BLD-MCNC: 96 MG/DL (ref 70–99)
HCT VFR BLD CALC: 25.4 % (ref 42–52)
HEMOGLOBIN: 8.6 G/DL (ref 14–18)
LYMPHOCYTES ABSOLUTE: 1.3 K/UL (ref 1–4.8)
LYMPHOCYTES RELATIVE PERCENT: 20.5 %
MAGNESIUM: 2 MG/DL (ref 1.7–2.4)
MCH RBC QN AUTO: 31.5 PG (ref 27–31.3)
MCHC RBC AUTO-ENTMCNC: 34.1 % (ref 33–37)
MCV RBC AUTO: 92.5 FL (ref 80–100)
MONOCYTES ABSOLUTE: 0.6 K/UL (ref 0.2–0.8)
MONOCYTES RELATIVE PERCENT: 9.2 %
NEUTROPHILS ABSOLUTE: 4.1 K/UL (ref 1.4–6.5)
NEUTROPHILS RELATIVE PERCENT: 63.2 %
PDW BLD-RTO: 14.4 % (ref 11.5–14.5)
PLATELET # BLD: 303 K/UL (ref 130–400)
POTASSIUM SERPL-SCNC: 4.7 MEQ/L (ref 3.4–4.9)
RBC # BLD: 2.74 M/UL (ref 4.7–6.1)
SODIUM BLD-SCNC: 139 MEQ/L (ref 135–144)
TOTAL PROTEIN: 5.5 G/DL (ref 6.3–8)
TROPONIN: 1.72 NG/ML (ref 0–0.01)
WBC # BLD: 6.5 K/UL (ref 4.8–10.8)

## 2022-07-05 PROCEDURE — 93005 ELECTROCARDIOGRAM TRACING: CPT | Performed by: PHYSICIAN ASSISTANT

## 2022-07-05 PROCEDURE — 85025 COMPLETE CBC W/AUTO DIFF WBC: CPT

## 2022-07-05 PROCEDURE — 99284 EMERGENCY DEPT VISIT MOD MDM: CPT

## 2022-07-05 PROCEDURE — 84484 ASSAY OF TROPONIN QUANT: CPT

## 2022-07-05 PROCEDURE — 70450 CT HEAD/BRAIN W/O DYE: CPT

## 2022-07-05 PROCEDURE — 2580000003 HC RX 258: Performed by: PHYSICIAN ASSISTANT

## 2022-07-05 PROCEDURE — 83735 ASSAY OF MAGNESIUM: CPT

## 2022-07-05 PROCEDURE — 80053 COMPREHEN METABOLIC PANEL: CPT

## 2022-07-05 PROCEDURE — 36415 COLL VENOUS BLD VENIPUNCTURE: CPT

## 2022-07-05 RX ORDER — 0.9 % SODIUM CHLORIDE 0.9 %
500 INTRAVENOUS SOLUTION INTRAVENOUS ONCE
Status: COMPLETED | OUTPATIENT
Start: 2022-07-05 | End: 2022-07-05

## 2022-07-05 RX ADMIN — SODIUM CHLORIDE 500 ML: 9 INJECTION, SOLUTION INTRAVENOUS at 19:42

## 2022-07-05 ASSESSMENT — ENCOUNTER SYMPTOMS
NAUSEA: 0
ANAL BLEEDING: 0
EYE DISCHARGE: 0
VOICE CHANGE: 0
VOMITING: 0
ABDOMINAL DISTENTION: 0
COUGH: 0
SHORTNESS OF BREATH: 0

## 2022-07-05 ASSESSMENT — PAIN - FUNCTIONAL ASSESSMENT: PAIN_FUNCTIONAL_ASSESSMENT: NONE - DENIES PAIN

## 2022-07-05 NOTE — PROGRESS NOTES
Physical Therapy  Facility/Department: [facility] MED SURG Z344/H581-17  Physical Therapy Discharge      NAME: [facility]    : [id number] (75 y.o.)  MRN: [id number]    Account: [de-identified]  Gender: male      Patient has been discharged from acute care hospital. DC patient from current PT program.      Electronically signed by Madison Jj PT on 22 at 9:32 AM EDT

## 2022-07-05 NOTE — ED TRIAGE NOTES
PT to ed from [facility] via triage with s/o with complaints of not feeling good since starting new meds  Pt discharged to home from inpatient on Sunday  Pt started metoprolol, flomax and protonix today  Pt reports feeing dizzy since  ON arrival, pt skin very dry, intact  Pt AOx3, calm and cooperative, respirations even and unlabored

## 2022-07-05 NOTE — PROGRESS NOTES
Left message with [facility] office to make appt. Awaiting call back.  Electronically signed by Daljit Ford on 7/5/2022 at 4:47 PM

## 2022-07-06 LAB
EKG ATRIAL RATE: 159 BPM
EKG ATRIAL RATE: 60 BPM
EKG P AXIS: 49 DEGREES
EKG P-R INTERVAL: 190 MS
EKG Q-T INTERVAL: 300 MS
EKG Q-T INTERVAL: 422 MS
EKG QRS DURATION: 162 MS
EKG QRS DURATION: 80 MS
EKG QTC CALCULATION (BAZETT): 422 MS
EKG QTC CALCULATION (BAZETT): 469 MS
EKG R AXIS: 18 DEGREES
EKG R AXIS: 40 DEGREES
EKG T AXIS: -10 DEGREES
EKG T AXIS: 76 DEGREES
EKG VENTRICULAR RATE: 147 BPM
EKG VENTRICULAR RATE: 60 BPM

## 2022-07-06 PROCEDURE — 93010 ELECTROCARDIOGRAM REPORT: CPT | Performed by: INTERNAL MEDICINE

## 2022-07-06 NOTE — ED PROVIDER NOTES
reviewed and negative. PAST MEDICAL HISTORY     Past Medical History:   Diagnosis Date    [facility] 6/28/2022    Anemia     Back pain     chronic    CAD (coronary artery disease)     Closed fracture of neck of left femur (HCC)     Epilepsy ([facility]     Hyperlipidemia     Hypertension     Paroxysmal A-fib ([facility]     Shingles          SURGICALHISTORY       Past Surgical History:   Procedure Laterality Date    CAROTID ENDARTERECTOMY      CHOLECYSTECTOMY, LAPAROSCOPIC N/A 04/13/2021    LAPAROSCOPIC CHOLECYSTECTOMY  WITH CHOLANGIOGRAMS performed by Esperanza Rogel MD at [address] [facility] COLONOSCOPY N/A 7/1/2022    COLONOSCOPY WITH INTERVENTION performed by Ankita Melvin MD at [place] [address] Ajit Todd N/A 6/29/2022    EGD DIAGNOSTIC ONLY performed by Ankita Melvin MD at [address] [facility]       Previous Medications    AMIODARONE (PACERONE) 200 MG TABLET    Take 1 tablet by mouth 2 times daily Take 200mg PO BID for 1 week then 200mg PO daily or as directed by . ASPIRIN 81 MG EC TABLET    Take 81 mg by mouth daily    ATORVASTATIN (LIPITOR) 80 MG TABLET    Take 80 mg by mouth at bedtime    CHOLECALCIFEROL 50 MCG (2000 UT) TABS    Take 2,000 Units by mouth daily    FERROUS SULFATE (IRON 325) 325 (65 FE) MG TABLET    Take 325 mg by mouth daily    LISINOPRIL (PRINIVIL;ZESTRIL) 10 MG TABLET    Take by mouth daily    MAGNESIUM OXIDE (MAG-OX) 400 MG TABLET    Take 1 tablet by mouth daily    METOPROLOL TARTRATE (LOPRESSOR) 25 MG TABLET    Take 0.5 tablets by mouth 2 times daily    NITROGLYCERIN (NITROSTAT) 0.4 MG SL TABLET    up to max of 3 total doses. If no relief after 1 dose, call 911. PANTOPRAZOLE (PROTONIX) 40 MG TABLET    Please take 1 tablet twice daily x 2 weeks, then once daily continous    PHENOBARBITAL (LUMINAL) 32.4 MG TABLET    Take 4 tablets by mouth 2 times daily for 30 days.     TAMSULOSIN (FLOMAX) 0.4 MG CAPSULE    Take 1 capsule by mouth daily            Penicillins    FAMILY HISTORY       Family History   Problem Relation Age of Onset    Colon Cancer Neg Hx           SOCIAL HISTORY       Social History     Socioeconomic History    Marital status:      Spouse name: None    Number of children: None    Years of education: None    Highest education level: None   Occupational History    None   Tobacco Use    Smoking status: Current Every Day Smoker     Packs/day: 1.00     Years: 10.00     Pack years: 10.00     Types: Cigarettes    Smokeless tobacco: Never Used   Vaping Use    Vaping Use: Never used   Substance and Sexual Activity    Alcohol use: Not Currently    Drug use: Never    Sexual activity: None   Other Topics Concern    None   Social History Narrative    None     Social Determinants of Health     Financial Resource Strain:     Difficulty of Paying Living Expenses: Not on file   Food Insecurity:     Worried About Running Out of Food in the Last Year: Not on file    Rachael of Food in the Last Year: Not on file   Transportation Needs:     Lack of Transportation (Medical): Not on file    Lack of Transportation (Non-Medical):  Not on file   Physical Activity:     Days of Exercise per Week: Not on file    Minutes of Exercise per Session: Not on file   Stress:     Feeling of Stress : Not on file   Social Connections:     Frequency of Communication with Friends and Family: Not on file    Frequency of Social Gatherings with Friends and Family: Not on file    Attends Caodaism Services: Not on file    Active Member of Clubs or Organizations: Not on file    Attends Club or Organization Meetings: Not on file    Marital Status: Not on file   Intimate Partner Violence:     Fear of Current or Ex-Partner: Not on file    Emotionally Abused: Not on file    Physically Abused: Not on file    Sexually Abused: Not on file   Housing Stability:     Unable to Pay for Housing in the Last Year: Not on file    Number of Places Lived in the Last Year: Not on file    Unstable Housing in the Last Year: Not on file       SCREENINGS   Atkins Coma Scale  Eye Opening: Spontaneous  Best Verbal Response: Oriented  Best Motor Response: Obeys commands  [place] Coma Scale Score: 15  Ebola Virus Disease (EVD) Screening   Temp: 98.3 °F (36.8 °C)  CIWA Assessment  BP: (!) 120/48  Heart Rate: 59    PHYSICAL EXAM    (up to 7 for level 4, 8 or more for level 5)     ED Triage Vitals   BP Temp Temp Source Heart Rate Resp SpO2 Height Weight   07/05/22 1856 07/05/22 1856 07/05/22 1856 07/05/22 1856 07/05/22 1857 07/05/22 1857 07/05/22 1857 07/05/22 1857   (!) 93/50 98.3 °F (36.8 °C) Oral 66 18 97 % 5' 5\" (1.651 m) 130 lb (59 kg)       Physical Exam  Vitals and nursing note reviewed. Constitutional:       General: He is not in acute distress. Appearance: He is well-developed. HENT:      Head: Normocephalic and atraumatic. Right Ear: External ear normal.      Left Ear: External ear normal.      Nose: Nose normal.      Mouth/Throat:      Mouth: Mucous membranes are moist.      Pharynx: No oropharyngeal exudate or posterior oropharyngeal erythema. Eyes:      General:         Right eye: No discharge. Left eye: No discharge. Extraocular Movements: Extraocular movements intact. Pupils: Pupils are equal, round, and reactive to light. Cardiovascular:      Rate and Rhythm: Normal rate and regular rhythm. Pulses: Normal pulses. Heart sounds: Normal heart sounds. Pulmonary:      Effort: Pulmonary effort is normal. No respiratory distress. Breath sounds: Normal breath sounds. No stridor. Abdominal:      General: Bowel sounds are normal. There is no distension. Palpations: Abdomen is soft. Tenderness: There is no abdominal tenderness. Musculoskeletal:         General: Normal range of motion. Cervical back: Normal range of motion and neck supple. Right lower leg: No edema. Left lower leg: No edema. Skin:     General: Skin is warm. Capillary Refill: Capillary refill takes less than 2 seconds. Findings: No erythema. Neurological:      Mental Status: He is alert and oriented to person, place, and time. Motor: No weakness. Coordination: Coordination normal.      Gait: Gait normal.   Psychiatric:         Mood and Affect: Mood normal.         RESULTS     EKG: All EKG's are interpreted by the Emergency Department Physician who either signs or Co-signsthis chart in the absence of a cardiologist.    EKG normal sinus rhythm rate 60 IA interval 190 ms QRS 80 ms.     RADIOLOGY:   Non-plain filmimages such as CT, Ultrasound and MRI are read by the radiologist. Plain radiographic images are visualized and preliminarily interpreted by the emergency physician with the below findings:    Preliminary report from stat rad states no acute intracranial process    Interpretation per the Radiologist below, if available at the time ofthis note:    CT Head WO Contrast    (Results Pending)         ED BEDSIDE ULTRASOUND:   Performed by ED Physician - none    LABS:  Labs Reviewed   COMPREHENSIVE METABOLIC PANEL - Abnormal; Notable for the following components:       Result Value    Anion Gap 8 (*)     CREATININE 1.61 (*)     GFR Non- 42.9 (*)     GFR  51.9 (*)     Calcium 8.4 (*)     Total Protein 5.5 (*)     Albumin 3.4 (*)     Alkaline Phosphatase 142 (*)     Globulin 2.1 (*)     All other components within normal limits   CBC WITH AUTO DIFFERENTIAL - Abnormal; Notable for the following components:    RBC 2.74 (*)     Hemoglobin 8.6 (*)     Hematocrit 25.4 (*)     MCH 31.5 (*)     All other components within normal limits   TROPONIN - Abnormal; Notable for the following components:    Troponin 1.720 (*)     All other components within normal limits    Narrative:     Anastasia Hill tel. [id number],  Chemistry results called to and read back by JACY Anderson, 07/05/2022 20:36, by  [address] other labs were within normal range or not returned as of this dictation. EMERGENCY DEPARTMENT COURSE and DIFFERENTIAL DIAGNOSIS/MDM:   Vitals:    Vitals:    07/05/22 1857 07/05/22 1944 07/05/22 1945 07/05/22 2040   BP:   (!) 111/42 (!) 120/48   Pulse:    59   Resp: 18 18 18 18   Temp:       TempSrc:       SpO2: 97% 98% 98% 100%   Weight: 130 lb (59 kg)      Height: 5' 5\" (1.651 m)               MDM  Number of Diagnoses or Management Options  Dizziness  Hypotension due to drugs  Diagnosis management comments: Post fluids patient has improved blood pressures increased from 96 systolic to 351 patient is ambulatory in emergency room with no difficulty states he has no symptoms feels better. We discussed follow-up with primary care and cardiology discontinuing Flomax. Discussed with pharmacy the note that Flomax can cause low blood pressure. Discussed with Dr. Teodora Madden including elevated troponin it is lower than past 2 draws at 1.7 family notes it has decreased. Amount and/or Complexity of Data Reviewed  Clinical lab tests: reviewed and ordered  Tests in the radiology section of CPT®: reviewed and ordered        CRITICAL CARE TIME       CONSULTS:  None    PROCEDURES:  Unless otherwise noted below, none     Procedures    FINAL IMPRESSION      1. Dizziness    2. Hypotension due to drugs          DISPOSITION/PLAN   DISPOSITION Decision To Discharge 07/05/2022 09:55:03 PM      PATIENT REFERRED TO:  Blaine Mazariegos DO  [address] [facility] [id number] [phone]    Call in 1 day      Your cardiologist    Call in 1 day      [facility]) ED  [address] [facility] [address] [phone]  Go to   If symptoms worsen      DISCHARGE MEDICATIONS:  New Prescriptions    No medications on file          (Please note that portions of this note were completed with a voice recognition program.  Efforts were made to edit the dictations but occasionally words are mis-transcribed.)    Wes Whitehead PA-C (electronically signed)  Attending Emergency Physician       Wes Whiteehad PA-C  07/05/22 0755

## 2022-07-06 NOTE — ED NOTES
Lab contacted with trop. Of 1.72. Meseret Mendoza [place] made aware.       Ann Stallworth RN  07/05/22 2038

## 2022-08-04 ENCOUNTER — OFFICE VISIT (OUTPATIENT)
Dept: GASTROENTEROLOGY | Age: 67
End: 2022-08-04
Payer: MEDICARE

## 2022-08-04 ENCOUNTER — TELEPHONE (OUTPATIENT)
Dept: GASTROENTEROLOGY | Age: 67
End: 2022-08-04

## 2022-08-04 VITALS
WEIGHT: 136 LBS | BODY MASS INDEX: 22.66 KG/M2 | DIASTOLIC BLOOD PRESSURE: 64 MMHG | HEART RATE: 53 BPM | HEIGHT: 65 IN | OXYGEN SATURATION: 97 % | SYSTOLIC BLOOD PRESSURE: 118 MMHG

## 2022-08-04 DIAGNOSIS — K92.1 GASTROINTESTINAL HEMORRHAGE WITH MELENA: Primary | ICD-10-CM

## 2022-08-04 DIAGNOSIS — R13.12 OROPHARYNGEAL DYSPHAGIA: ICD-10-CM

## 2022-08-04 DIAGNOSIS — I25.10 CAD IN NATIVE ARTERY: ICD-10-CM

## 2022-08-04 DIAGNOSIS — K55.20 AVM (ARTERIOVENOUS MALFORMATION) OF COLON: ICD-10-CM

## 2022-08-04 PROCEDURE — G8420 CALC BMI NORM PARAMETERS: HCPCS | Performed by: NURSE PRACTITIONER

## 2022-08-04 PROCEDURE — G8427 DOCREV CUR MEDS BY ELIG CLIN: HCPCS | Performed by: NURSE PRACTITIONER

## 2022-08-04 PROCEDURE — 99213 OFFICE O/P EST LOW 20 MIN: CPT | Performed by: NURSE PRACTITIONER

## 2022-08-04 PROCEDURE — 1123F ACP DISCUSS/DSCN MKR DOCD: CPT | Performed by: NURSE PRACTITIONER

## 2022-08-04 PROCEDURE — 3017F COLORECTAL CA SCREEN DOC REV: CPT | Performed by: NURSE PRACTITIONER

## 2022-08-04 PROCEDURE — 4004F PT TOBACCO SCREEN RCVD TLK: CPT | Performed by: NURSE PRACTITIONER

## 2022-08-04 NOTE — PROGRESS NOTES
Gastroenterology Clinic Follow up Visit    Sofia Galo  [id number]  Chief Complaint   Patient presents with    Follow-up     HPI: 75 y.o. male following up after recent hospitalization on 6/28/2022 - 7/3/2022 with KRYS and GI bleed in the context of supratherapeutic INR secondary to Coumadin. He is s/p multiple blood transfusions and FFP. S/p EGD and colonoscopy revealing AVMs with successful APC ablation. He is s/p cardiac cath with no stent placement however does have CAD in the LAD and circumflex with recommendation for CABG. Interval change: Patient reports doing better from GI perspective. He denies any further melena since discharge from the hospital.  He endorses a bowel movement every other day, soft/formed, takes oral iron supplementation and baby aspirin daily. He is no longer on warfarin. Reports plans to have multivessel CABG with Dr. Justin Rivera in [facility] on 8/15/22. He reports choking on solid foods such as rice/corn and peanuts when swallowing. He denies choking on liquids. He denies GERD symptoms (on Protonix daily), nausea/vomiting, hematemesis, abdominal pain, hematochezia, melena or weight loss. Patient denies chest pain, shortness of breath or palpitations. Smoking status: 1 ppd  Alcohol use: denies  Illicit drug use: denies  NSAID use: denies     Summary of HPI from inpatient GI consult on 6/29/2022:  75 y.o. male with PMH of KRYS, anemia, back pain, CAD, left femur fracture, epilepsy, HLD, HTN, paroxysmal A. Fib (on coumadin and baby ASA) and shingles. Admitted with KRYS and GI bleed. Patient with 1-2-week onset of nausea, abdominal pain, black tarry stools. No prior history of GERD. Endorses longstanding history of esophageal dysphagia to solids, mainly rice/breads. No prior history of GI bleed. He reports history of EGD and colonoscopies in the past, however records unavailable. Reports unremarkable findings on previous EGD in 2021.   Reports multiple colonoscopies due to history of multiple polyps (more than 20) requiring resection. Followed with Dr. Luna Ozuna at [place] [address] for these. Reports last colonoscopy sometime in 2021 and was told to repeat colonoscopy in a couple of years. +1.5ppd cigarette smoker. History of alcohol use in the past, quit a couple of years ago. Prior use of NSAIDs earlier this year, rare use currently. On admit, Hgb 8.9, MCV 91.7 (baseline Hgb 13 and 2021), BUN 88, creatinine 1.69, and INR 6.2. He received vitamin K IV in ER. Hgb dropped to 6.4 last night, he received a unit of blood. Currently on Protonix drip and n.p.o. Today, Hgb 7.0, BUN 64, creatinine 1.23, INR 1.5. Retroperitoneal ultrasound yesterday pending results. He had multiple PVCs overnight and multiple episodes of nonsustained V. tach this morning, cardiology consulted. Previous GI work up/Endoscopic investigations:  Colonoscopy 7/1/2022: Ascending colon AVMs, successful ablation with APC. Otherwise normal colonic mucosa throughout, no evidence for active bleeding or stigmata of recent bleed. EGD 6/29/2022: No stigmata of recent bleed, no evidence for active bleeding. EGD and colonoscopy at [facility] 1/22/2020 with Dr. Gudelia Serrano. He had a 1-2 cm hiatal hernia, pyloric edema, along with some gastric body/duodenal bulb inflammation, no active bleeding identified. He had multiple colon polyps in the ascending, descending, sigmoid and rectum. Ascending colon tumor measuring approximately 3-4 cm at that time. He states this is when he was referred to Dr. Luna Ozuna for removal.  These records are not available    Review of Systems   All other systems reviewed and are negative. Past medical history, past surgical history, medication list, social and familyhistory reviewed    Blood pressure 118/64, pulse 53, height 5' 5\" (1.651 m), weight 136 lb (61.7 kg), SpO2 97 %. Physical Exam  Constitutional:       General: He is not in acute distress. Appearance: Normal appearance.  He is normal weight. He is not ill-appearing. HENT:      Head: Normocephalic and atraumatic. Eyes:      General: No scleral icterus. Cardiovascular:      Rate and Rhythm: Normal rate and regular rhythm. Pulses: Normal pulses. Pulmonary:      Effort: Pulmonary effort is normal. No respiratory distress. Breath sounds: Normal breath sounds. Abdominal:      General: Bowel sounds are normal. There is no distension. Palpations: Abdomen is soft. There is no mass. Tenderness: There is no abdominal tenderness. There is no guarding or rebound. Musculoskeletal:         General: Normal range of motion. Lymphadenopathy:      Cervical: No cervical adenopathy. Skin:     General: Skin is warm and dry. Coloration: Skin is not jaundiced. Neurological:      Mental Status: He is alert and oriented to person, place, and time. Psychiatric:         Mood and Affect: Mood normal.         Behavior: Behavior normal.         Thought Content: Thought content normal.         Judgment: Judgment normal.     Laboratory, Pathology, Radiology reviewed in detail with relevantimportant investigations summarized below:    Lab Results   Component Value Date    WBC 6.5 07/05/2022    HGB 8.6 (L) 07/05/2022    HCT 25.4 (L) 07/05/2022    MCV 92.5 07/05/2022     07/05/2022     Lab Results   Component Value Date    ALT 14 07/05/2022    AST 19 07/05/2022    ALKPHOS 142 (H) 07/05/2022    BILITOT <0.2 07/05/2022     No recent GI imaging results found. Assessment and Plan:  Phuong Velasquez 75 y.o. male with recent admission 6/28/2022 - 7/3/2022 with KRYS and GI bleed (S/P multiple transfusions/FFP) in the context of supratherapeutic INR secondary to Coumadin. S/p normal EGD and colonoscopy revealing ascending colon AVMs s/p successful APC ablation. He denies any further melena since discharge from the hospital.  On oral iron supplementation and Protonix daily.   He reports a longstanding history of oropharyngeal dysphagia/choking on certain solids (rice/corn), recent EGD negative for any narrowing/obstruction. He is s/p cardiac cath with no stent placement however does have CAD in the LAD and circumflex with recommendation for CABG. 1. History of Gastrointestinal hemorrhage with melena  2. Hx of AVM (arteriovenous malformation) of colon  -Will request CBC to monitor trends  -Given patient has right colonic AVMs, likely he has small bowel AVMs as well, will request small bowel capsule endoscopy to evaluate further. 3. Oropharyngeal dysphagia  -No evidence for obstruction on recent EGD. Will consider modified barium swallow at future follow-up. 4. CAD in native artery  -Suspect patient's recent GI bleed to be secondary to AVMs (right colon and possibly small bowel) in the context of anticoagulation/supratherapeutic INR. No overt GI bleed since discharge from the hospital. Not currently on anticoagulation.   -Discussed with patient, resumption of anticoagulation or antiplatelet therapy in the future post CABG will increase risk of GI bleed. However, this should not prevent patient from proceeding with CABG as the benefits of cardiac perfusion outweigh the risk of possible future GI bleed. Return in about 3 months (around 11/4/2022). KATHY Carcamo - CNP   Staff Gastroenterology Nurse Practitioner  [facility]    Please note this report has been partially produced using speech recognition software and contain errors related to that system including grammar, punctuation and spelling as well as words and phrases that may seem inappropriate. If there are questions or concerns please feel free to contact me to clarify.

## 2022-08-04 NOTE — TELEPHONE ENCOUNTER
The patient spouse called to give us the patient NORAH LLOYDFredo QUIJANO [facility] Cardiologist fax number [phone], office# [phone] (Dr. Bushra Mejias)

## 2022-08-05 ENCOUNTER — TELEPHONE (OUTPATIENT)
Dept: GASTROENTEROLOGY | Age: 67
End: 2022-08-05

## 2022-08-05 NOTE — TELEPHONE ENCOUNTER
Vincent Guerrero w/ Endoscopy is calling in regards to active GI capsule order. Patient is scheduled for heart surgery on 8/16 and thought it was suggested to wait until after he has heart procedure.  Please advise if capsule should be performed prior

## 2022-08-08 ENCOUNTER — TELEPHONE (OUTPATIENT)
Dept: GASTROENTEROLOGY | Age: 67
End: 2022-08-08

## 2022-08-08 NOTE — TELEPHONE ENCOUNTER
[facility] and they would like a GI clearance sent to their office @ [phone], in order to do the Heart surgery

## 2022-08-08 NOTE — TELEPHONE ENCOUNTER
Please fax my last office note to NORAH QUIJANO [facility] Cardiologist fax number [phone], office# [phone] (Dr. Los Edmondson). Thank you.

## 2023-02-04 PROBLEM — N13.8 BPH WITH OBSTRUCTION/LOWER URINARY TRACT SYMPTOMS: Status: ACTIVE | Noted: 2023-02-04

## 2023-02-04 PROBLEM — S42.209A PROXIMAL HUMERUS FRACTURE: Status: ACTIVE | Noted: 2023-02-04

## 2023-02-04 PROBLEM — I67.9 SMALL VESSEL DISEASE, CEREBROVASCULAR: Status: ACTIVE | Noted: 2023-02-04

## 2023-02-04 PROBLEM — D51.9 B12 DEFICIENCY ANEMIA: Status: ACTIVE | Noted: 2023-02-04

## 2023-02-04 PROBLEM — F17.200 CURRENT SMOKER: Status: ACTIVE | Noted: 2023-02-04

## 2023-02-04 PROBLEM — I25.10 CAD (CORONARY ATHEROSCLEROTIC DISEASE): Status: ACTIVE | Noted: 2023-02-04

## 2023-02-04 PROBLEM — I10 HYPERTENSION: Status: ACTIVE | Noted: 2023-02-04

## 2023-02-04 PROBLEM — N40.1 BPH WITH OBSTRUCTION/LOWER URINARY TRACT SYMPTOMS: Status: ACTIVE | Noted: 2023-02-04

## 2023-02-04 PROBLEM — M54.50 LOW BACK PAIN: Status: ACTIVE | Noted: 2023-02-04

## 2023-02-04 PROBLEM — U07.1 COVID-19: Status: ACTIVE | Noted: 2023-02-04

## 2023-02-04 PROBLEM — I48.0 PAROXYSMAL ATRIAL FIBRILLATION (MULTI): Status: ACTIVE | Noted: 2023-02-04

## 2023-02-04 PROBLEM — N52.9 INABILITY TO ATTAIN ERECTION: Status: ACTIVE | Noted: 2023-02-04

## 2023-02-04 PROBLEM — I07.1 TRICUSPID INSUFFICIENCY: Status: ACTIVE | Noted: 2023-02-04

## 2023-02-04 PROBLEM — G45.9 TIA (TRANSIENT ISCHEMIC ATTACK): Status: ACTIVE | Noted: 2023-02-04

## 2023-02-04 PROBLEM — J44.9 COPD (CHRONIC OBSTRUCTIVE PULMONARY DISEASE) (MULTI): Status: ACTIVE | Noted: 2023-02-04

## 2023-02-04 PROBLEM — K26.9 DUODENAL ULCER: Status: ACTIVE | Noted: 2023-02-04

## 2023-02-04 PROBLEM — R20.0 LEFT FACIAL NUMBNESS: Status: ACTIVE | Noted: 2023-02-04

## 2023-02-04 PROBLEM — R42 DIZZINESS: Status: ACTIVE | Noted: 2023-02-04

## 2023-02-04 PROBLEM — I27.20 PULMONARY HYPERTENSION (MULTI): Status: ACTIVE | Noted: 2023-02-04

## 2023-02-04 PROBLEM — Z95.1 S/P CABG (CORONARY ARTERY BYPASS GRAFT): Status: ACTIVE | Noted: 2023-02-04

## 2023-02-04 PROBLEM — K92.2 GI BLEED: Status: ACTIVE | Noted: 2023-02-04

## 2023-02-04 PROBLEM — I65.23 BILATERAL CAROTID ARTERY STENOSIS: Status: ACTIVE | Noted: 2023-02-04

## 2023-02-04 PROBLEM — D64.9 ANEMIA: Status: ACTIVE | Noted: 2023-02-04

## 2023-02-04 PROBLEM — K62.5 RECTAL BLEEDING: Status: ACTIVE | Noted: 2023-02-04

## 2023-02-04 PROBLEM — N48.6 PEYRONIE DISEASE: Status: ACTIVE | Noted: 2023-02-04

## 2023-02-04 PROBLEM — K80.20 CHOLELITHIASES: Status: ACTIVE | Noted: 2023-02-04

## 2023-02-04 PROBLEM — M54.2 NECK PAIN: Status: ACTIVE | Noted: 2023-02-04

## 2023-02-04 PROBLEM — E78.2 MIXED HYPERLIPIDEMIA: Status: ACTIVE | Noted: 2023-02-04

## 2023-02-04 PROBLEM — K63.5 COLON POLYPS: Status: ACTIVE | Noted: 2023-02-04

## 2023-02-04 PROBLEM — K63.5 POLYP OF COLON: Status: ACTIVE | Noted: 2023-02-04

## 2023-02-04 PROBLEM — R01.1 HEART MURMUR: Status: ACTIVE | Noted: 2023-02-04

## 2023-02-04 PROBLEM — M25.512 SHOULDER PAIN, LEFT: Status: ACTIVE | Noted: 2023-02-04

## 2023-02-04 PROBLEM — I47.29 VENTRICULAR TACHYCARDIA (PAROXYSMAL) (MULTI): Status: ACTIVE | Noted: 2023-02-04

## 2023-02-04 PROBLEM — S72.002A CLOSED FRACTURE OF LEFT HIP (MULTI): Status: ACTIVE | Noted: 2023-02-04

## 2023-02-04 PROBLEM — I25.10 CAD (CORONARY ARTERY DISEASE): Status: ACTIVE | Noted: 2023-02-04

## 2023-02-04 PROBLEM — T83.410A: Status: ACTIVE | Noted: 2023-02-04

## 2023-02-04 PROBLEM — D12.6 TUBULAR ADENOMA OF COLON: Status: ACTIVE | Noted: 2023-02-04

## 2023-02-04 PROBLEM — I95.2 HYPOTENSION DUE TO DRUGS: Status: ACTIVE | Noted: 2023-02-04

## 2023-02-04 PROBLEM — E55.9 VITAMIN D DEFICIENCY: Status: ACTIVE | Noted: 2023-02-04

## 2023-02-04 PROBLEM — R56.9 SEIZURES (MULTI): Status: ACTIVE | Noted: 2023-02-04

## 2023-02-04 PROBLEM — K59.00 CONSTIPATION: Status: ACTIVE | Noted: 2023-02-04

## 2023-02-04 PROBLEM — I71.9 PENETRATING ATHEROSCLEROTIC ULCER OF AORTA (CMS-HCC): Status: ACTIVE | Noted: 2023-02-04

## 2023-02-04 PROBLEM — F32.A DEPRESSION: Status: ACTIVE | Noted: 2023-02-04

## 2023-02-04 PROBLEM — R01.1 CARDIAC MURMUR: Status: ACTIVE | Noted: 2023-02-04

## 2023-02-04 PROBLEM — E78.5 HLD (HYPERLIPIDEMIA): Status: ACTIVE | Noted: 2023-02-04

## 2023-02-04 PROBLEM — I47.20 VENTRICULAR TACHYCARDIA (PAROXYSMAL): Status: ACTIVE | Noted: 2023-02-04

## 2023-02-04 RX ORDER — ASPIRIN 81 MG/1
1 TABLET ORAL DAILY
COMMUNITY
Start: 2021-08-24

## 2023-02-04 RX ORDER — PANTOPRAZOLE SODIUM 40 MG/1
1 TABLET, DELAYED RELEASE ORAL DAILY
COMMUNITY
End: 2023-03-23 | Stop reason: SDUPTHER

## 2023-02-04 RX ORDER — FERROUS SULFATE 325(65) MG
65 TABLET ORAL 2 TIMES DAILY
COMMUNITY
End: 2023-06-05

## 2023-02-04 RX ORDER — FLUTICASONE PROPIONATE AND SALMETEROL 100; 50 UG/1; UG/1
1 POWDER RESPIRATORY (INHALATION) 2 TIMES DAILY
COMMUNITY
Start: 2022-08-24 | End: 2023-03-23 | Stop reason: WASHOUT

## 2023-02-04 RX ORDER — PHENOBARBITAL 64.8 MG/1
1 TABLET ORAL 2 TIMES DAILY
COMMUNITY
End: 2023-03-20 | Stop reason: SDUPTHER

## 2023-02-04 RX ORDER — FUROSEMIDE 20 MG/1
1 TABLET ORAL DAILY
COMMUNITY
Start: 2022-08-24 | End: 2023-03-23 | Stop reason: WASHOUT

## 2023-02-04 RX ORDER — ATORVASTATIN CALCIUM 80 MG/1
1 TABLET, FILM COATED ORAL NIGHTLY
COMMUNITY
Start: 2020-11-16 | End: 2023-03-06 | Stop reason: SDUPTHER

## 2023-02-04 RX ORDER — NICOTINE 7MG/24HR
1 PATCH, TRANSDERMAL 24 HOURS TRANSDERMAL DAILY
COMMUNITY
Start: 2022-11-08

## 2023-02-04 RX ORDER — CLOPIDOGREL BISULFATE 75 MG/1
1 TABLET ORAL DAILY
COMMUNITY
Start: 2022-09-22 | End: 2023-09-11 | Stop reason: SDUPTHER

## 2023-02-04 RX ORDER — IBUPROFEN 200 MG
1 TABLET ORAL DAILY
COMMUNITY
Start: 2022-11-08 | End: 2023-03-22 | Stop reason: ALTCHOICE

## 2023-02-04 RX ORDER — CHOLECALCIFEROL (VITAMIN D3) 50 MCG
1 TABLET ORAL DAILY
COMMUNITY

## 2023-02-04 RX ORDER — TAMSULOSIN HYDROCHLORIDE 0.4 MG/1
0.4 CAPSULE ORAL NIGHTLY
COMMUNITY
End: 2023-10-10 | Stop reason: ALTCHOICE

## 2023-02-04 RX ORDER — METOPROLOL SUCCINATE 50 MG/1
1 TABLET, EXTENDED RELEASE ORAL 2 TIMES DAILY
COMMUNITY
End: 2023-09-05 | Stop reason: SDUPTHER

## 2023-02-04 RX ORDER — CYANOCOBALAMIN 1000 UG/ML
1000 INJECTION, SOLUTION INTRAMUSCULAR; SUBCUTANEOUS
COMMUNITY
End: 2023-10-10 | Stop reason: ALTCHOICE

## 2023-02-20 LAB — FERRITIN (UG/LL) IN SER/PLAS: 102 UG/L (ref 20–300)

## 2023-03-06 DIAGNOSIS — I65.23 BILATERAL CAROTID ARTERY OCCLUSION: ICD-10-CM

## 2023-03-06 RX ORDER — ATORVASTATIN CALCIUM 80 MG/1
80 TABLET, FILM COATED ORAL NIGHTLY
Qty: 90 TABLET | Refills: 0 | Status: SHIPPED | OUTPATIENT
Start: 2023-03-06 | End: 2023-06-05

## 2023-03-20 DIAGNOSIS — R56.9 SEIZURES (MULTI): Primary | ICD-10-CM

## 2023-03-22 PROBLEM — K63.5 COLONIC POLYP: Status: ACTIVE | Noted: 2023-03-22

## 2023-03-22 PROBLEM — D50.9 IRON DEFICIENCY ANEMIA: Status: ACTIVE | Noted: 2023-03-22

## 2023-03-22 PROBLEM — E53.8 VITAMIN B 12 DEFICIENCY: Status: ACTIVE | Noted: 2023-03-22

## 2023-03-22 RX ORDER — NITROGLYCERIN 0.4 MG/1
TABLET SUBLINGUAL
COMMUNITY
Start: 2022-07-03 | End: 2023-10-10 | Stop reason: SDUPTHER

## 2023-03-22 RX ORDER — PHENOBARBITAL 64.8 MG/1
64.8 TABLET ORAL 2 TIMES DAILY
Qty: 30 TABLET | Refills: 0 | Status: SHIPPED | OUTPATIENT
Start: 2023-03-22 | End: 2023-03-23 | Stop reason: SDUPTHER

## 2023-03-22 RX ORDER — LANOLIN ALCOHOL/MO/W.PET/CERES
CREAM (GRAM) TOPICAL
COMMUNITY
End: 2023-03-23 | Stop reason: WASHOUT

## 2023-03-22 RX ORDER — AMIODARONE HYDROCHLORIDE 200 MG/1
1 TABLET ORAL DAILY
COMMUNITY
Start: 2022-10-13 | End: 2023-03-23 | Stop reason: ALTCHOICE

## 2023-03-22 RX ORDER — WARFARIN SODIUM 5 MG/1
TABLET ORAL
COMMUNITY
Start: 2022-04-18 | End: 2023-03-23 | Stop reason: SINTOL

## 2023-03-22 RX ORDER — WARFARIN 1 MG/1
TABLET ORAL
COMMUNITY
Start: 2022-04-18 | End: 2023-03-23 | Stop reason: WASHOUT

## 2023-03-22 NOTE — PROGRESS NOTES
Subjective   Patient ID: Gold Summers is a 67 y.o. male who presents for 3 month follow up for monitoring and management of multiple medical conditions.     Pt has anemia.   He was previously referred to GI and hematology for poor iron absorption  He was then started on IV iron therapy.  3/23/2023: Pt reports that he has completed iron infusions and was started on oral iron supplements once daily.        Patient has hypertension.  He does monitor readings at home. He states it is usually around 140s/70s.  He denies CP, SOB at this time.  He is taking Metoprolol.      He has hyperlipidemia.  He tries to limit the amount of fatty foods and high cholesterol foods that he consumes in his diet.  Patient states that he has been compliant with the dosing of his statin therapy.     Pt has CAD.  Presently denies any chest pain shortness of breath.  9/16/2022: Underwent off-pump coronary bypass with R bilateral pulmonary vein isolation LENCHO to LAD, and LIMA to OM, left atrial appendage ligation with a 45 mm clip by Dr. Sawyer Ibrahim  Surgical ablation lines created:  1. Right antral pulmonary vein isolation  2. Left antral pulmonary vein isolation  3. Left atrial appendage ligation     Antiarrhythmic therapy: Metoprolol succinate 50 mg daily  Antiplatelet therapy: Aspirin 81 mg daily, clopidogrel 75 mg daily  Anticoagulation therapy: none     He had atrial fibrillation.  Pt has maintained sinus rhythm after surgery.  HE WAS NOT RESTARTED ON ANTICOAGULATION.     He has a seizure disorder.  He has not had a seizure for years and has been maintained on phenobarbital without noted side effects.     He has vitamin D deficiency.  Patient states that he is compliant with his vitamin D supplementation.     He unfortunately continues to smoke despite my advice and encouragement.       Pt has carotid artery stenosis.  Patient underwent right CEA 1/27/2021 with Dr. Taylor.     Pt has COPD.  Pt is currently treated with Advair.  Symptoms  "are at baseline.  Denies any exacerbations.      Review of Systems    Constitutional: Patient denies any fever, chills, loss of appetite, or unexplained weight loss.     Cardiovascular: Patient denies any chest pain, shortness of breath with exertion, tachycardia, palpitations, orthopnea, or paroxysmal nocturnal dyspnea.  Respiratory: Patient denies any cough, shortness of breath, or wheezing.   Gastrointestinal: Patient denies any nausea, vomiting, diarrhea, constipation, melena, hematochezia, or reflux symptoms  Endocrinology: Denies any polyuria, polydipsia, polyphagia, or heat/cold intolerance.  Skin: Denies any rashes or skin lesions   Neurology: Patient denies any new motor or sensory losses. Denies any numbness, tingling, weakness, and incoordination of the extremities. Patient also denies any tremor, seizures, or gait instability.  Endocrinology: Denies any polyuria, polydipsia, polyphagia, or heat/cold intolerance.     SEE ALSO HISTORY OF PRESENT ILLNESS     Objective   /70   Pulse 62   Temp 37 °C (98.6 °F)   Ht 1.651 m (5' 5\")   Wt 60.2 kg (132 lb 12.8 oz)   SpO2 98%   BMI 22.10 kg/m²     Physical Exam    Gen. appearance: Alert and cooperative, no acute distress, well-developed/well-nourished male. He presents today with his significant other, who provides pt's Hx.     Head: Normocephalic and atraumatic  Neck: Supple and without adenopathy, no JVD at 90° and no carotid bruits are noted. There is no thyromegaly, thyroid tenderness, or palpable thyroid nodules.  Cardiovascular: Regular rate and rhythm without murmur or ectopy.  Respiratory: Clear to auscultation bilaterally with good air exchange. Good respiratory effort, no accessory muscle use.  Abdomen: Soft, nontender/nondistended. No masses, guarding, rebound, or rigidity. No hepatosplenomegaly, abdominal bruits, or CVA tenderness. Bowel sounds are normal. There is no widening of the aortic pulsation.  Skin: Good turgor, moist, warm and " without rashes or lesions.  Extremities: No clubbing, cyanosis, or edema.     Assessment/Plan     HTN: Blood pressure is elevated in office today.  3/23/2023: We will start him on olmesartan for his elevated blood pressure.    Hyperlipidemia: Patient will continue with the current medication.  Dietary changes, exercise, and maintenance of healthy weight were discussed at length.     CAD: Patient underwent CABG x2 and left atrial appendage ligation during the 8/15/2022 CABG as his Coumadin was discontinued on 7/1/2022 as a result of a GI bleed.  Patient was started on clopidogrel per cardiology.     Paroxysmal A. fib: Pt underwent left atrial appendage ligation after his anticoagulation was discontinued 7/2022 for a GI bleed.   Pt has maintained sinus rhythm and will be following with the surgical antiarrhythmia clinic.  We will continue the current medication and will continue to follow with cardiology as well.     Anemia: He was referred to hematology and was seen 12/9/2022.  PT WAS TREATED WITH IRON INFUSIONS.  We will continue current supplementation and pt will continue to follow with hematology.    Hx GI bleed: Patient has not experienced any further signs of GI bleeding and denies any melena or hematochezia.     COPD: Stable based on symptoms.  Smoking cessation encouraged.  Pt will continue the current medication.     BPH: Stable based on symptoms.  He is following with urology as well.     Seizure disorder: Stable based on symptoms.  Patient is to continue on the phenobarbital.  We will continue to monitor.     Vit B12 deficiency: Stable on recent labs.  Pt to continue monthly B12 injections.     Vit D deficiency: Pt will continue the current vitamin D supplementation.        PSA due 11/18/2023  COLONOSCOPY DUE 7/2024    Scribe Attestation    By signing my name below, I, Rufus Hughes, attest that this documentation has been prepared under the direction and in the presence of Dr. Garcia.  All  medical record entries made by the Scribe were at my direction and personally dictated by me. I have reviewed the chart and agree that the record accurately reflects my personal performance of the history, physical exam, discussion and plan. (Dr. Garcia).

## 2023-03-23 ENCOUNTER — OFFICE VISIT (OUTPATIENT)
Dept: PRIMARY CARE | Facility: CLINIC | Age: 68
End: 2023-03-23
Payer: MEDICARE

## 2023-03-23 VITALS
WEIGHT: 132.8 LBS | OXYGEN SATURATION: 98 % | HEART RATE: 62 BPM | HEIGHT: 65 IN | TEMPERATURE: 98.6 F | SYSTOLIC BLOOD PRESSURE: 161 MMHG | BODY MASS INDEX: 22.13 KG/M2 | DIASTOLIC BLOOD PRESSURE: 70 MMHG

## 2023-03-23 DIAGNOSIS — I48.0 PAF (PAROXYSMAL ATRIAL FIBRILLATION) (MULTI): ICD-10-CM

## 2023-03-23 DIAGNOSIS — J44.9 CHRONIC OBSTRUCTIVE PULMONARY DISEASE, UNSPECIFIED COPD TYPE (MULTI): ICD-10-CM

## 2023-03-23 DIAGNOSIS — E55.9 VITAMIN D DEFICIENCY: ICD-10-CM

## 2023-03-23 DIAGNOSIS — D50.9 IRON DEFICIENCY ANEMIA, UNSPECIFIED IRON DEFICIENCY ANEMIA TYPE: ICD-10-CM

## 2023-03-23 DIAGNOSIS — E78.2 MIXED HYPERLIPIDEMIA: ICD-10-CM

## 2023-03-23 DIAGNOSIS — I25.10 ATHEROSCLEROSIS OF NATIVE CORONARY ARTERY OF NATIVE HEART, UNSPECIFIED WHETHER ANGINA PRESENT: ICD-10-CM

## 2023-03-23 DIAGNOSIS — R56.9 SEIZURES (MULTI): ICD-10-CM

## 2023-03-23 DIAGNOSIS — E53.8 VITAMIN B 12 DEFICIENCY: ICD-10-CM

## 2023-03-23 DIAGNOSIS — N40.1 BPH WITH OBSTRUCTION/LOWER URINARY TRACT SYMPTOMS: ICD-10-CM

## 2023-03-23 DIAGNOSIS — I10 PRIMARY HYPERTENSION: Primary | ICD-10-CM

## 2023-03-23 DIAGNOSIS — G40.909 SEIZURE DISORDER (MULTI): ICD-10-CM

## 2023-03-23 DIAGNOSIS — N13.8 BPH WITH OBSTRUCTION/LOWER URINARY TRACT SYMPTOMS: ICD-10-CM

## 2023-03-23 DIAGNOSIS — K92.2 GASTROINTESTINAL HEMORRHAGE, UNSPECIFIED GASTROINTESTINAL HEMORRHAGE TYPE: ICD-10-CM

## 2023-03-23 PROBLEM — S72.002A CLOSED FRACTURE OF NECK OF LEFT FEMUR (MULTI): Status: ACTIVE | Noted: 2021-08-09

## 2023-03-23 PROBLEM — S72.009A FRACTURE OF NECK OF FEMUR (MULTI): Status: ACTIVE | Noted: 2021-08-09

## 2023-03-23 PROBLEM — F17.200 SMOKER: Status: ACTIVE | Noted: 2021-08-11

## 2023-03-23 PROBLEM — K80.10 CHRONIC CHOLECYSTITIS WITH CALCULUS: Status: ACTIVE | Noted: 2021-02-05

## 2023-03-23 PROBLEM — K81.2 ACUTE CHOLECYSTITIS WITH CHRONIC CHOLECYSTITIS: Status: ACTIVE | Noted: 2021-04-15

## 2023-03-23 PROBLEM — I48.91 ATRIAL FIBRILLATION WITH RAPID VENTRICULAR RESPONSE (MULTI): Status: ACTIVE | Noted: 2021-08-09

## 2023-03-23 PROBLEM — D62 ACUTE BLOOD LOSS ANEMIA: Status: ACTIVE | Noted: 2022-07-03

## 2023-03-23 PROBLEM — T83.9XXA: Status: ACTIVE | Noted: 2021-08-11

## 2023-03-23 PROBLEM — I21.4 NON-ST ELEVATION MI (NSTEMI) (MULTI): Status: ACTIVE | Noted: 2022-07-03

## 2023-03-23 PROBLEM — Z98.890 HISTORY OF CAROTID ENDARTERECTOMY: Status: ACTIVE | Noted: 2021-08-10

## 2023-03-23 PROBLEM — T83.410A: Status: ACTIVE | Noted: 2023-03-23

## 2023-03-23 PROBLEM — N17.9 AKI (ACUTE KIDNEY INJURY) (CMS-HCC): Status: ACTIVE | Noted: 2022-06-28

## 2023-03-23 PROBLEM — I47.29 NSVT (NONSUSTAINED VENTRICULAR TACHYCARDIA) (MULTI): Status: ACTIVE | Noted: 2022-07-03

## 2023-03-23 PROBLEM — K55.20 AVM (ARTERIOVENOUS MALFORMATION) OF COLON: Status: ACTIVE | Noted: 2023-03-23

## 2023-03-23 PROCEDURE — 99214 OFFICE O/P EST MOD 30 MIN: CPT | Performed by: FAMILY MEDICINE

## 2023-03-23 PROCEDURE — 1159F MED LIST DOCD IN RCRD: CPT | Performed by: FAMILY MEDICINE

## 2023-03-23 PROCEDURE — 3078F DIAST BP <80 MM HG: CPT | Performed by: FAMILY MEDICINE

## 2023-03-23 PROCEDURE — 3077F SYST BP >= 140 MM HG: CPT | Performed by: FAMILY MEDICINE

## 2023-03-23 RX ORDER — HYDROCHLOROTHIAZIDE 12.5 MG/1
12.5 TABLET ORAL
COMMUNITY
Start: 2021-08-20 | End: 2023-03-23 | Stop reason: WASHOUT

## 2023-03-23 RX ORDER — OLMESARTAN MEDOXOMIL 20 MG/1
20 TABLET ORAL DAILY
Qty: 30 TABLET | Refills: 2 | Status: SHIPPED | OUTPATIENT
Start: 2023-03-23 | End: 2023-05-30 | Stop reason: SDUPTHER

## 2023-03-23 RX ORDER — PHENOBARBITAL 64.8 MG/1
64.8 TABLET ORAL 2 TIMES DAILY
Qty: 180 TABLET | Refills: 0 | Status: SHIPPED | OUTPATIENT
Start: 2023-03-23 | End: 2023-06-15 | Stop reason: SDUPTHER

## 2023-03-23 RX ORDER — LISINOPRIL 10 MG/1
TABLET ORAL
COMMUNITY
Start: 2021-08-20 | End: 2023-03-23 | Stop reason: WASHOUT

## 2023-03-23 RX ORDER — PANTOPRAZOLE SODIUM 40 MG/1
40 TABLET, DELAYED RELEASE ORAL
Qty: 90 TABLET | Refills: 0 | Status: SHIPPED | OUTPATIENT
Start: 2023-03-23 | End: 2023-06-26 | Stop reason: SDUPTHER

## 2023-03-23 ASSESSMENT — PATIENT HEALTH QUESTIONNAIRE - PHQ9
1. LITTLE INTEREST OR PLEASURE IN DOING THINGS: NOT AT ALL
2. FEELING DOWN, DEPRESSED OR HOPELESS: NOT AT ALL
SUM OF ALL RESPONSES TO PHQ9 QUESTIONS 1 AND 2: 0

## 2023-03-23 NOTE — PATIENT INSTRUCTIONS
Call [phone] to see if you qualify for free or discounted nicotine patches.    Follow up with your cardiologist to see what medications to stop before your surgery.    Start taking Olmesartan prescribed today to help with your blood pressure. Lab orders should be completed in 2 weeks.    Continue the current medications. Follow up in 3 months with labs PRIOR.    It was a pleasure to see you today. Thank you for choosing us for your health care needs.    If you have lab or other testing completed and have not been informed of results within one week, please call the office for your results.    If you haven't done so, consider signing up for [employer], the [facility] personal health record. Ask the staff how you can get started.

## 2023-03-28 ENCOUNTER — OFFICE VISIT (OUTPATIENT)
Dept: PRIMARY CARE | Facility: CLINIC | Age: 68
End: 2023-03-28
Payer: MEDICARE

## 2023-03-28 DIAGNOSIS — E53.8 VITAMIN B 12 DEFICIENCY: ICD-10-CM

## 2023-03-28 PROCEDURE — 96372 THER/PROPH/DIAG INJ SC/IM: CPT | Performed by: FAMILY MEDICINE

## 2023-03-28 PROCEDURE — 1159F MED LIST DOCD IN RCRD: CPT | Performed by: FAMILY MEDICINE

## 2023-03-28 RX ORDER — CYANOCOBALAMIN 1000 UG/ML
1000 INJECTION, SOLUTION INTRAMUSCULAR; SUBCUTANEOUS ONCE
Status: COMPLETED | OUTPATIENT
Start: 2023-03-28 | End: 2023-03-28

## 2023-03-28 RX ADMIN — CYANOCOBALAMIN 1000 MCG: 1000 INJECTION, SOLUTION INTRAMUSCULAR; SUBCUTANEOUS at 11:14

## 2023-05-01 RX ORDER — TAMSULOSIN HYDROCHLORIDE 0.4 MG/1
CAPSULE ORAL
Qty: 90 CAPSULE | OUTPATIENT
Start: 2023-05-01

## 2023-05-19 DIAGNOSIS — I10 PRIMARY HYPERTENSION: ICD-10-CM

## 2023-05-19 RX ORDER — OLMESARTAN MEDOXOMIL 20 MG/1
TABLET ORAL
Qty: 30 TABLET | Refills: 2 | OUTPATIENT
Start: 2023-05-19

## 2023-05-30 RX ORDER — OLMESARTAN MEDOXOMIL 20 MG/1
20 TABLET ORAL DAILY
Qty: 90 TABLET | Refills: 0 | Status: SHIPPED | OUTPATIENT
Start: 2023-05-30 | End: 2023-06-01 | Stop reason: SDUPTHER

## 2023-06-01 DIAGNOSIS — I10 PRIMARY HYPERTENSION: ICD-10-CM

## 2023-06-01 DIAGNOSIS — D50.9 IRON DEFICIENCY ANEMIA, UNSPECIFIED: ICD-10-CM

## 2023-06-01 DIAGNOSIS — I65.23 BILATERAL CAROTID ARTERY OCCLUSION: ICD-10-CM

## 2023-06-05 RX ORDER — OLMESARTAN MEDOXOMIL 20 MG/1
20 TABLET ORAL DAILY
Qty: 90 TABLET | Refills: 0 | Status: SHIPPED | OUTPATIENT
Start: 2023-06-05 | End: 2023-09-05 | Stop reason: SDUPTHER

## 2023-06-05 RX ORDER — FERROUS SULFATE 325(65) MG
TABLET ORAL
Qty: 180 TABLET | Refills: 0 | Status: SHIPPED | OUTPATIENT
Start: 2023-06-05 | End: 2023-11-07 | Stop reason: SDUPTHER

## 2023-06-05 RX ORDER — ATORVASTATIN CALCIUM 80 MG/1
80 TABLET, FILM COATED ORAL NIGHTLY
Qty: 90 TABLET | Refills: 0 | Status: SHIPPED | OUTPATIENT
Start: 2023-06-05 | End: 2023-09-05 | Stop reason: SDUPTHER

## 2023-06-15 DIAGNOSIS — R56.9 SEIZURES (MULTI): ICD-10-CM

## 2023-06-15 RX ORDER — PHENOBARBITAL 64.8 MG/1
64.8 TABLET ORAL 2 TIMES DAILY
Qty: 20 TABLET | Refills: 0 | Status: SHIPPED | OUTPATIENT
Start: 2023-06-15 | End: 2023-06-29 | Stop reason: SDUPTHER

## 2023-06-15 RX ORDER — PHENOBARBITAL 64.8 MG/1
64.8 TABLET ORAL 2 TIMES DAILY
Qty: 14 TABLET | Refills: 0 | Status: SHIPPED | OUTPATIENT
Start: 2023-06-15 | End: 2023-06-15 | Stop reason: SDUPTHER

## 2023-06-15 NOTE — TELEPHONE ENCOUNTER
Dania Garcia he has until Sunday and with the 7 you  gave him he will have until the 26th. Can  we squeeze him on Monday the 26th ?

## 2023-06-27 LAB
CHOLESTEROL (MG/DL) IN SER/PLAS: 142 MG/DL (ref 0–199)
CHOLESTEROL IN HDL (MG/DL) IN SER/PLAS: 46.1 MG/DL
CHOLESTEROL/HDL RATIO: 3.1
CHOLESTEROL/HDL RATIO: 3.1
CHOLESTEROL: 142 MG/DL (ref 0–199)
CK ISOENZYMES: 86 U/L (ref 0–325)
CREATINE KINASE (U/L) IN SER/PLAS: 86 U/L (ref 0–325)
HDLC SERPL-MCNC: 46.1 MG/DL
LDL CHOLESTEROL: 71 MG/DL (ref 0–99)
LDL: 71 MG/DL (ref 0–99)
TRIGL SERPL-MCNC: 127 MG/DL (ref 0–149)
TRIGLYCERIDE (MG/DL) IN SER/PLAS: 127 MG/DL (ref 0–149)
VLDL: 25 MG/DL (ref 0–40)
VLDLC SERPL CALC-MCNC: 25 MG/DL (ref 0–40)

## 2023-06-29 ENCOUNTER — OFFICE VISIT (OUTPATIENT)
Dept: PRIMARY CARE | Facility: CLINIC | Age: 68
End: 2023-06-29
Payer: MEDICARE

## 2023-06-29 VITALS
OXYGEN SATURATION: 96 % | WEIGHT: 131.8 LBS | SYSTOLIC BLOOD PRESSURE: 130 MMHG | DIASTOLIC BLOOD PRESSURE: 78 MMHG | HEART RATE: 60 BPM | TEMPERATURE: 97.3 F | HEIGHT: 65 IN | BODY MASS INDEX: 21.96 KG/M2

## 2023-06-29 DIAGNOSIS — J44.9 CHRONIC OBSTRUCTIVE PULMONARY DISEASE, UNSPECIFIED COPD TYPE (MULTI): ICD-10-CM

## 2023-06-29 DIAGNOSIS — R56.9 SEIZURES (MULTI): ICD-10-CM

## 2023-06-29 DIAGNOSIS — N13.8 BPH WITH OBSTRUCTION/LOWER URINARY TRACT SYMPTOMS: ICD-10-CM

## 2023-06-29 DIAGNOSIS — I25.10 CORONARY ARTERY DISEASE INVOLVING NATIVE CORONARY ARTERY OF NATIVE HEART WITHOUT ANGINA PECTORIS: ICD-10-CM

## 2023-06-29 DIAGNOSIS — I48.0 PAF (PAROXYSMAL ATRIAL FIBRILLATION) (MULTI): ICD-10-CM

## 2023-06-29 DIAGNOSIS — I10 ESSENTIAL (PRIMARY) HYPERTENSION: Primary | ICD-10-CM

## 2023-06-29 DIAGNOSIS — E53.8 VITAMIN B 12 DEFICIENCY: ICD-10-CM

## 2023-06-29 DIAGNOSIS — N40.1 BPH WITH OBSTRUCTION/LOWER URINARY TRACT SYMPTOMS: ICD-10-CM

## 2023-06-29 DIAGNOSIS — E78.2 MIXED HYPERLIPIDEMIA: ICD-10-CM

## 2023-06-29 DIAGNOSIS — D50.9 IRON DEFICIENCY ANEMIA, UNSPECIFIED IRON DEFICIENCY ANEMIA TYPE: ICD-10-CM

## 2023-06-29 DIAGNOSIS — Z79.899 HIGH RISK MEDICATION USE: ICD-10-CM

## 2023-06-29 DIAGNOSIS — E55.9 VITAMIN D DEFICIENCY: ICD-10-CM

## 2023-06-29 DIAGNOSIS — G40.909 SEIZURE DISORDER (MULTI): ICD-10-CM

## 2023-06-29 PROBLEM — J81.1 CHRONIC PULMONARY EDEMA (HHS-HCC): Status: ACTIVE | Noted: 2022-08-19

## 2023-06-29 PROCEDURE — 80358 DRUG SCREENING METHADONE: CPT

## 2023-06-29 PROCEDURE — 80361 OPIATES 1 OR MORE: CPT

## 2023-06-29 PROCEDURE — 80365 DRUG SCREENING OXYCODONE: CPT

## 2023-06-29 PROCEDURE — 80368 SEDATIVE HYPNOTICS: CPT

## 2023-06-29 PROCEDURE — 80354 DRUG SCREENING FENTANYL: CPT

## 2023-06-29 PROCEDURE — 80307 DRUG TEST PRSMV CHEM ANLYZR: CPT

## 2023-06-29 PROCEDURE — 3075F SYST BP GE 130 - 139MM HG: CPT | Performed by: FAMILY MEDICINE

## 2023-06-29 PROCEDURE — 3078F DIAST BP <80 MM HG: CPT | Performed by: FAMILY MEDICINE

## 2023-06-29 PROCEDURE — 80373 DRUG SCREENING TRAMADOL: CPT

## 2023-06-29 PROCEDURE — 80346 BENZODIAZEPINES1-12: CPT

## 2023-06-29 PROCEDURE — 99214 OFFICE O/P EST MOD 30 MIN: CPT | Performed by: FAMILY MEDICINE

## 2023-06-29 PROCEDURE — 80345 DRUG SCREENING BARBITURATES: CPT

## 2023-06-29 PROCEDURE — 1159F MED LIST DOCD IN RCRD: CPT | Performed by: FAMILY MEDICINE

## 2023-06-29 RX ORDER — FUROSEMIDE 20 MG/1
20 TABLET ORAL DAILY
COMMUNITY
Start: 2023-06-10 | End: 2023-09-05 | Stop reason: SDUPTHER

## 2023-06-29 RX ORDER — LISINOPRIL 10 MG/1
10 TABLET ORAL
COMMUNITY
Start: 2021-08-20 | End: 2023-06-29 | Stop reason: WASHOUT

## 2023-06-29 RX ORDER — PHENOBARBITAL 64.8 MG/1
64.8 TABLET ORAL 2 TIMES DAILY
Qty: 180 TABLET | Refills: 0 | Status: SHIPPED | OUTPATIENT
Start: 2023-06-29 | End: 2023-09-27 | Stop reason: SDUPTHER

## 2023-06-29 ASSESSMENT — PATIENT HEALTH QUESTIONNAIRE - PHQ9
2. FEELING DOWN, DEPRESSED OR HOPELESS: NOT AT ALL
1. LITTLE INTEREST OR PLEASURE IN DOING THINGS: NOT AT ALL
SUM OF ALL RESPONSES TO PHQ9 QUESTIONS 1 AND 2: 0

## 2023-06-29 NOTE — PROGRESS NOTES
Subjective   Patient ID: Gold Summers is a 68 y.o. male who presents for 3 month follow up for monitoring and management of multiple medical conditions.      HPI     Pt has anemia.   He was previously referred to GI and hematology for poor iron absorption  PREVIOUSLY STARTED ON IRON INFUSIONS BY HEMATOLOGY.  6/29/2023: He states he is now taking daily iron supplements.     Patient has hypertension.  He does monitor readings at home.   He denies CP, SOB at this time.  He is states that he is compliant with his antihypertensive medication.      He has hyperlipidemia.  He tries to limit the amount of fatty foods and high cholesterol foods that he consumes in his diet.  Patient states that he has been compliant with the dosing of his statin therapy.     Pt has CAD.  Presently denies any chest pain shortness of breath.  9/16/2022: Underwent off-pump coronary bypass with R bilateral pulmonary vein isolation LENCHO to LAD, and LIMA to OM, left atrial appendage ligation with a 45 mm clip by Dr. Sawyer Ibrahim  Surgical ablation lines created:  1. Right antral pulmonary vein isolation  2. Left antral pulmonary vein isolation  3. Left atrial appendage ligation      He had atrial fibrillation.  Pt has maintained sinus rhythm after surgery.  He had follow up in the surgery antiarrhythmia clinic 12/6/2022 and they are planning a 7 day cardiac monitor and a CT to evaluate the left atrial appendage ligation.   Not on anticoagulation since he had the left atrial appendage ligation.     He has a seizure disorder.  He has not had a seizure for years and has been maintained on phenobarbital without noted side effects.     He has vitamin D deficiency.  Patient states that he is compliant with his vitamin D supplementation.     He unfortunately continues to smoke despite my advice and encouragement.      Pt has carotid artery stenosis.  Patient underwent right CEA 1/27/2021 with Dr. Taylor.     Pt has COPD.  Pt is currently treated with  Advair.  He feels his breathing is at baseline and he denies any exacerbations since his last visit.      OARRS:  Miguel Garcia, DO on 6/29/2023 10:29 AM  I have personally reviewed the OARRS report for Gold Summers. I have considered the risks of abuse, dependence, addiction and diversion    Is the patient prescribed a combination of a benzodiazepine and opioid?  No    Last Urine Drug Screen / ordered today: Yes  Recent Results (from the past 05978 hour(s))   Drug Screen, Urine With Reflex to Confirmation    Collection Time: 03/14/22  5:32 PM   Result Value Ref Range    DRUG SCREEN COMMENT URINE SEE BELOW     Amphetamine Screen, Urine PRESUMPTIVE NEGATIVE NEGATIVE    Barbiturate Screen, Urine PRESUMPTIVE POSITIVE (A) NEGATIVE    BENZODIAZEPINE (PRESENCE) IN URINE BY SCREEN METHOD PRESUMPTIVE NEGATIVE NEGATIVE    Cannabinoid Screen, Urine PRESUMPTIVE NEGATIVE NEGATIVE    Cocaine Screen, Urine PRESUMPTIVE NEGATIVE NEGATIVE    Fentanyl, Ur PRESUMPTIVE NEGATIVE NEGATIVE    Methadone Screen, Urine PRESUMPTIVE NEGATIVE NEGATIVE    Opiate Screen, Urine PRESUMPTIVE NEGATIVE NEGATIVE    Oxycodone Screen, Ur PRESUMPTIVE NEGATIVE NEGATIVE    PCP Screen, Urine PRESUMPTIVE NEGATIVE NEGATIVE     N/A    Controlled Substance Agreement:  Date of the Last Agreement: 6/29/2023  Reviewed Controlled Substance Agreement including but not limited to the benefits, risks, and alternatives to treatment with a Controlled Substance medication(s).    Anticonvulsant:   What is the patient's goal of therapy? Prevent seizures  Is this being achieved with current treatment? yes    Activities of Daily Living:   Is your overall impression that this patient is benefiting (symptom reduction outweighs side effects) from Anticonvulsant therapy? Yes     1. Physical Functioning: Same  2. Family Relationship: Same  3. Social Relationship: Same  4. Mood: Same  5. Sleep Patterns: Same  6. Overall Function: Same        Review of Systems    Constitutional:  "Patient denies any fever, chills, loss of appetite, or unexplained weight loss.     Cardiovascular: Patient denies any chest pain, shortness of breath with exertion, tachycardia, palpitations, orthopnea, or paroxysmal nocturnal dyspnea.  Respiratory: Patient denies any cough, shortness of breath, or wheezing.   Gastrointestinal: Patient denies any nausea, vomiting, diarrhea, constipation, melena, hematochezia, or reflux symptoms  Endocrinology: Denies any polyuria, polydipsia, polyphagia, or heat/cold intolerance.  Skin: Denies any rashes or skin lesions   Neurology: Patient denies any new motor or sensory losses. Denies any numbness, tingling, weakness, and incoordination of the extremities. Patient also denies any tremor, seizures, or gait instability.  Endocrinology: Denies any polyuria, polydipsia, polyphagia, or heat/cold intolerance.     SEE ALSO HISTORY OF PRESENT ILLNESS     Objective   /78   Pulse 60   Temp 36.3 °C (97.3 °F)   Ht 1.651 m (5' 5\")   Wt 59.8 kg (131 lb 12.8 oz)   SpO2 96%   BMI 21.93 kg/m²     Physical Exam    Gen. appearance: Alert and cooperative, no acute distress, well-developed/well-nourished male. He presents today with his significant other, who provides pt's Hx.     Head: Normocephalic and atraumatic  Neck: Supple and without adenopathy, no JVD at 90° and no carotid bruits are noted. There is no thyromegaly, thyroid tenderness, or palpable thyroid nodules.  Cardiovascular: Regular rate and rhythm without murmur or ectopy.  Respiratory: Clear to auscultation bilaterally with good air exchange. Good respiratory effort, no accessory muscle use.  Abdomen: Soft, nontender/nondistended. No masses, guarding, rebound, or rigidity. No hepatosplenomegaly, abdominal bruits, or CVA tenderness. Bowel sounds are normal. There is no widening of the aortic pulsation.  Skin: Good turgor, moist, warm and without rashes or lesions.  Extremities: No clubbing, cyanosis, or edema. "     Assessment/Plan     HTN:  Blood pressure appears adequately controlled and we will continue with the current antihypertensive therapy.     Hyperlipidemia: Patient will continue with the current medication.  Dietary changes, exercise, and maintenance of healthy weight were discussed at length.     CAD: Patient underwent CABG x2 and left atrial appendage ligation during the 8/15/2022 CABG as his Coumadin was discontinued on 7/1/2022 as a result of a GI bleed.  Patient was started on clopidogrel per cardiology.     Paroxysmal A. fib: Pt underwent left atrial appendage ligation after his anticoagulation was discontinued 7/2022 for a GI bleed.   Pt has maintained sinus rhythm and will be following with the surgical antiarrhythmia clinic.  We will continue the current medication and will continue to follow with cardiology as well.     Anemia: He was referred to hematology and was seen 12/9/2022.  PT WAS TREATED WITH IRON INFUSIONS.  We will continue current supplementation and pt will continue to follow with hematology.      COPD: Stable based on symptoms.  Smoking cessation encouraged.  Pt will continue the current medication.     BPH: Stable based on symptoms.  He is following with urology as well.     Seizure disorder: Stable based on symptoms.  Patient is to continue on the phenobarbital.  We will continue to monitor.     Vit B12 deficiency: Stable on recent labs.  Pt to continue monthly B12 injections.     Vit D deficiency: Pt will continue the current vitamin D supplementation.        PSA due 11/18/2023  COLONOSCOPY DUE 7/2024  MCAW due 12/2023      Orders Placed This Encounter   Procedures    Opiate/Opioid/Benzo Extended Prescription Compliance    OPIATE/OPIOID/BENZO PRESCRIPTION COMPLIANCE    Barbiturate, Urine, Confirmation     Requested Prescriptions     Signed Prescriptions Disp Refills    PHENobarbitaL (Luminal) 64.8 mg tablet 180 tablet 0     Sig: Take 1 tablet (64.8 mg) by mouth 2 times a day.            By signing my name below, I, Ramez Hughesibe, attest that this documentation has been prepared under the direction and in the presence of Dr. Garcia.  All medical record entries made by the Scribe were at my direction and personally dictated by me. I have reviewed the chart and agree that the record accurately reflects my personal performance of the history, physical exam, discussion and plan. (Dr. Garcia).

## 2023-06-29 NOTE — PATIENT INSTRUCTIONS
Try adding some fiber in your diet. You may get metamucil at your local pharmacy.    Continue the current medications. Follow up in 3 months.    It was a pleasure to see you today. Thank you for choosing us for your health care needs.    If you have lab or other testing completed and have not been informed of results within one week, please call the office for your results.    If you haven't done so, consider signing up for [employer], the [facility] personal health record. Ask the staff how you can get started.

## 2023-06-30 DIAGNOSIS — I65.23 BILATERAL CAROTID ARTERY OCCLUSION: ICD-10-CM

## 2023-07-03 RX ORDER — ATORVASTATIN CALCIUM 80 MG/1
80 TABLET, FILM COATED ORAL NIGHTLY
Qty: 90 TABLET | Refills: 0 | OUTPATIENT
Start: 2023-07-03

## 2023-07-06 LAB
6-ACETYLMORPHINE: <25 NG/ML
7-AMINOCLONAZEPAM: <25 NG/ML
ALPHA-HYDROXYALPRAZOLAM: <25 NG/ML
ALPHA-HYDROXYMIDAZOLAM: <25 NG/ML
ALPRAZOLAM: <25 NG/ML
AMPHETAMINE (PRESENCE) IN URINE BY SCREEN METHOD: ABNORMAL
BARBITURATES PRESENCE IN URINE BY SCREEN METHOD: ABNORMAL
BUTALBITAL, URN, QUANT: <50 NG/ML
CANNABINOIDS IN URINE BY SCREEN METHOD: ABNORMAL
CHLORDIAZEPOXIDE: <25 NG/ML
CLONAZEPAM: <25 NG/ML
COCAINE (PRESENCE) IN URINE BY SCREEN METHOD: ABNORMAL
CODEINE: <50 NG/ML
CREATINE, URINE FOR DRUG: 42.2 MG/DL
DIAZEPAM: <25 NG/ML
DRUG SCREEN COMMENT URINE: ABNORMAL
EDDP: <25 NG/ML
FENTANYL CONFIRMATION, URINE: <2.5 NG/ML
HYDROCODONE: <25 NG/ML
HYDROMORPHONE: <25 NG/ML
LORAZEPAM: <25 NG/ML
METHADONE CONFIRMATION,URINE: <25 NG/ML
MIDAZOLAM: <25 NG/ML
MORPHINE URINE: <50 NG/ML
NORDIAZEPAM: <25 NG/ML
NORFENTANYL: <2.5 NG/ML
NORHYDROCODONE: <25 NG/ML
NOROXYCODONE: <25 NG/ML
O-DESMETHYLTRAMADOL: <50 NG/ML
OXAZEPAM: <25 NG/ML
OXYCODONE: <25 NG/ML
OXYMORPHONE: <25 NG/ML
PENTOBARBITAL, URN, QUANT: <50 NG/ML
PHENCYCLIDINE (PRESENCE) IN URINE BY SCREEN METHOD: ABNORMAL
PHENOBARBITAL, URN, QUANT: >5000 NG/ML
TEMAZEPAM: <25 NG/ML
TRAMADOL: <50 NG/ML
ZOLPIDEM METABOLITE (ZCA): <25 NG/ML
ZOLPIDEM: <25 NG/ML

## 2023-07-27 NOTE — ED NOTES
Pt also c/o black stools over the past couple days. Positive hemoccult.       Jamarcus Acevedo RN  06/28/22 [id number]
show

## 2023-09-05 DIAGNOSIS — I65.23 BILATERAL CAROTID ARTERY OCCLUSION: ICD-10-CM

## 2023-09-05 DIAGNOSIS — I10 PRIMARY HYPERTENSION: ICD-10-CM

## 2023-09-05 RX ORDER — OLMESARTAN MEDOXOMIL 5 MG/1
5 TABLET ORAL DAILY
COMMUNITY
End: 2023-10-10 | Stop reason: SDUPTHER

## 2023-09-06 RX ORDER — FUROSEMIDE 20 MG/1
20 TABLET ORAL DAILY
Qty: 90 TABLET | Refills: 0 | Status: SHIPPED | OUTPATIENT
Start: 2023-09-06 | End: 2023-10-10 | Stop reason: SDUPTHER

## 2023-09-06 RX ORDER — METOPROLOL SUCCINATE 50 MG/1
50 TABLET, EXTENDED RELEASE ORAL 2 TIMES DAILY
Qty: 90 TABLET | Refills: 0 | Status: SHIPPED | OUTPATIENT
Start: 2023-09-06 | End: 2023-10-10 | Stop reason: SDUPTHER

## 2023-09-06 RX ORDER — ATORVASTATIN CALCIUM 80 MG/1
80 TABLET, FILM COATED ORAL NIGHTLY
Qty: 90 TABLET | Refills: 0 | Status: SHIPPED | OUTPATIENT
Start: 2023-09-06 | End: 2023-10-10 | Stop reason: SDUPTHER

## 2023-09-06 RX ORDER — OLMESARTAN MEDOXOMIL 20 MG/1
20 TABLET ORAL DAILY
Qty: 90 TABLET | Refills: 0 | Status: SHIPPED | OUTPATIENT
Start: 2023-09-06 | End: 2023-12-11 | Stop reason: SDUPTHER

## 2023-09-11 DIAGNOSIS — I27.20 PULMONARY HYPERTENSION (MULTI): Primary | ICD-10-CM

## 2023-09-11 DIAGNOSIS — I25.10 ARTERIOSCLEROSIS OF CORONARY ARTERY: ICD-10-CM

## 2023-09-11 RX ORDER — CLOPIDOGREL BISULFATE 75 MG/1
75 TABLET ORAL DAILY
Qty: 90 TABLET | Refills: 0 | Status: SHIPPED | OUTPATIENT
Start: 2023-09-11 | End: 2023-12-11

## 2023-09-22 DIAGNOSIS — K92.2 GASTROINTESTINAL HEMORRHAGE, UNSPECIFIED GASTROINTESTINAL HEMORRHAGE TYPE: ICD-10-CM

## 2023-09-22 RX ORDER — PANTOPRAZOLE SODIUM 40 MG/1
40 TABLET, DELAYED RELEASE ORAL
Qty: 90 TABLET | Refills: 0 | OUTPATIENT
Start: 2023-09-22

## 2023-09-27 DIAGNOSIS — G40.909 SEIZURE DISORDER (MULTI): ICD-10-CM

## 2023-09-27 DIAGNOSIS — K92.2 GASTROINTESTINAL HEMORRHAGE, UNSPECIFIED GASTROINTESTINAL HEMORRHAGE TYPE: ICD-10-CM

## 2023-09-29 RX ORDER — PHENOBARBITAL 64.8 MG/1
64.8 TABLET ORAL 2 TIMES DAILY
Qty: 180 TABLET | Refills: 0 | Status: SHIPPED | OUTPATIENT
Start: 2023-09-29 | End: 2023-12-19 | Stop reason: SDUPTHER

## 2023-09-29 RX ORDER — PANTOPRAZOLE SODIUM 40 MG/1
40 TABLET, DELAYED RELEASE ORAL
Qty: 90 TABLET | Refills: 0 | Status: SHIPPED | OUTPATIENT
Start: 2023-09-29 | End: 2023-12-20 | Stop reason: SDUPTHER

## 2023-10-02 ENCOUNTER — OFFICE VISIT (OUTPATIENT)
Dept: PRIMARY CARE | Facility: CLINIC | Age: 68
End: 2023-10-02
Payer: MEDICARE

## 2023-10-02 VITALS
SYSTOLIC BLOOD PRESSURE: 132 MMHG | WEIGHT: 134.3 LBS | TEMPERATURE: 98 F | BODY MASS INDEX: 22.38 KG/M2 | OXYGEN SATURATION: 98 % | DIASTOLIC BLOOD PRESSURE: 64 MMHG | HEART RATE: 61 BPM | HEIGHT: 65 IN

## 2023-10-02 DIAGNOSIS — I10 ESSENTIAL (PRIMARY) HYPERTENSION: Primary | ICD-10-CM

## 2023-10-02 DIAGNOSIS — I27.20 PULMONARY HYPERTENSION (MULTI): ICD-10-CM

## 2023-10-02 DIAGNOSIS — E55.9 VITAMIN D DEFICIENCY: ICD-10-CM

## 2023-10-02 DIAGNOSIS — Z23 ENCOUNTER FOR IMMUNIZATION: ICD-10-CM

## 2023-10-02 DIAGNOSIS — N13.8 BPH WITH OBSTRUCTION/LOWER URINARY TRACT SYMPTOMS: ICD-10-CM

## 2023-10-02 DIAGNOSIS — I48.0 PAF (PAROXYSMAL ATRIAL FIBRILLATION) (MULTI): ICD-10-CM

## 2023-10-02 DIAGNOSIS — J44.9 CHRONIC OBSTRUCTIVE PULMONARY DISEASE, UNSPECIFIED COPD TYPE (MULTI): ICD-10-CM

## 2023-10-02 DIAGNOSIS — Z12.5 PROSTATE CANCER SCREENING: ICD-10-CM

## 2023-10-02 DIAGNOSIS — G40.909 SEIZURE DISORDER (MULTI): ICD-10-CM

## 2023-10-02 DIAGNOSIS — I25.10 CORONARY ARTERY DISEASE INVOLVING NATIVE CORONARY ARTERY OF NATIVE HEART WITHOUT ANGINA PECTORIS: ICD-10-CM

## 2023-10-02 DIAGNOSIS — E53.8 VITAMIN B 12 DEFICIENCY: ICD-10-CM

## 2023-10-02 DIAGNOSIS — H35.3221 EXUDATIVE AGE-RELATED MACULAR DEGENERATION, LEFT EYE, WITH ACTIVE CHOROIDAL NEOVASCULARIZATION (MULTI): ICD-10-CM

## 2023-10-02 DIAGNOSIS — D50.9 IRON DEFICIENCY ANEMIA, UNSPECIFIED IRON DEFICIENCY ANEMIA TYPE: ICD-10-CM

## 2023-10-02 DIAGNOSIS — N40.1 BPH WITH OBSTRUCTION/LOWER URINARY TRACT SYMPTOMS: ICD-10-CM

## 2023-10-02 DIAGNOSIS — E78.2 MIXED HYPERLIPIDEMIA: ICD-10-CM

## 2023-10-02 PROBLEM — R01.1 CARDIAC MURMUR: Status: RESOLVED | Noted: 2023-02-04 | Resolved: 2023-10-02

## 2023-10-02 PROBLEM — U07.1 COVID-19: Status: RESOLVED | Noted: 2023-02-04 | Resolved: 2023-10-02

## 2023-10-02 PROCEDURE — 3078F DIAST BP <80 MM HG: CPT | Performed by: FAMILY MEDICINE

## 2023-10-02 PROCEDURE — G0008 ADMIN INFLUENZA VIRUS VAC: HCPCS | Performed by: FAMILY MEDICINE

## 2023-10-02 PROCEDURE — 99214 OFFICE O/P EST MOD 30 MIN: CPT | Performed by: FAMILY MEDICINE

## 2023-10-02 PROCEDURE — 90662 IIV NO PRSV INCREASED AG IM: CPT | Performed by: FAMILY MEDICINE

## 2023-10-02 PROCEDURE — 3075F SYST BP GE 130 - 139MM HG: CPT | Performed by: FAMILY MEDICINE

## 2023-10-02 PROCEDURE — 1159F MED LIST DOCD IN RCRD: CPT | Performed by: FAMILY MEDICINE

## 2023-10-02 ASSESSMENT — PATIENT HEALTH QUESTIONNAIRE - PHQ9
1. LITTLE INTEREST OR PLEASURE IN DOING THINGS: NOT AT ALL
SUM OF ALL RESPONSES TO PHQ9 QUESTIONS 1 AND 2: 0
2. FEELING DOWN, DEPRESSED OR HOPELESS: NOT AT ALL

## 2023-10-02 NOTE — PROGRESS NOTES
Subjective   Patient ID: Gold Summers is a 68 y.o. male who presents for Follow-up.    HPI     No new concerns   Labs: 06/29/2023  Colonoscopy: 2021    Pt has anemia.   He was previously referred to GI and hematology for poor iron absorption  PREVIOUSLY STARTED ON IRON INFUSIONS BY HEMATOLOGY.  6/29/2023: He states he is now taking daily iron supplements.     Patient has hypertension.  He does monitor readings at home.   He denies CP, SOB at this time.  He is states that he is compliant with his antihypertensive medication.      He has hyperlipidemia.  He tries to limit the amount of fatty foods and high cholesterol foods that he consumes in his diet.  Patient states that he has been compliant with the dosing of his statin therapy.     Pt has CAD.  Presently denies any chest pain shortness of breath.  9/16/2022: Underwent off-pump coronary bypass with R bilateral pulmonary vein isolation LENCHO to LAD, and LIMA to OM, left atrial appendage ligation with a 45 mm clip by Dr. Sawyer Ibrahim  Surgical ablation lines created:  1. Right antral pulmonary vein isolation  2. Left antral pulmonary vein isolation  3. Left atrial appendage ligation      He had atrial fibrillation.  Pt has maintained sinus rhythm after surgery.  He had follow up in the surgery antiarrhythmia clinic 12/6/2022 and they are planning a 7 day cardiac monitor and a CT to evaluate the left atrial appendage ligation.   Not on anticoagulation since he had the left atrial appendage ligation.     He has a seizure disorder.  He has not had a seizure for years and has been maintained on phenobarbital without noted side effects.     He has vitamin D deficiency.  Patient states that he is compliant with his vitamin D supplementation.     He unfortunately continues to smoke despite my advice and encouragement.      Pt has carotid artery stenosis.  Patient underwent right CEA 1/27/2021 with Dr. Taylor.     Pt has COPD.  Pt is currently treated with Advair.  He  feels his breathing is at baseline and he denies any exacerbations since his last visit.      OARRS:  Miguel Garcia, DO on 10/2/2023 11:25 AM  I have personally reviewed the OARRS report for Gold Summers. I have considered the risks of abuse, dependence, addiction and diversion and I believe that it is clinically appropriate for Gold Summers to be prescribed this medication    Is the patient prescribed a combination of a benzodiazepine and opioid?  No    Last Urine Drug Screen / ordered today: No  Recent Results (from the past 8760 hour(s))   OPIATE/OPIOID/BENZO PRESCRIPTION COMPLIANCE    Collection Time: 06/29/23 10:53 AM   Result Value Ref Range    DRUG SCREEN COMMENT URINE SEE BELOW     Creatine, Urine 42.2 mg/dL    Amphetamine Screen, Urine PRESUMPTIVE NEGATIVE NEGATIVE    Barbiturate Screen, Urine PRESUMPTIVE POSITIVE (A) NEGATIVE    Cannabinoid Screen, Urine PRESUMPTIVE NEGATIVE NEGATIVE    Cocaine Screen, Urine PRESUMPTIVE NEGATIVE NEGATIVE    PCP Screen, Urine PRESUMPTIVE NEGATIVE NEGATIVE    7-Aminoclonazepam <25 Cutoff <25 ng/mL    Alpha-Hydroxyalprazolam <25 Cutoff <25 ng/mL    Alpha-Hydroxymidazolam <25 Cutoff <25 ng/mL    Alprazolam <25 Cutoff <25 ng/mL    Chlordiazepoxide <25 Cutoff <25 ng/mL    Clonazepam <25 Cutoff <25 ng/mL    Diazepam <25 Cutoff <25 ng/mL    Lorazepam <25 Cutoff <25 ng/mL    Midazolam <25 Cutoff <25 ng/mL    Nordiazepam <25 Cutoff <25 ng/mL    Oxazepam <25 Cutoff <25 ng/mL    Temazepam <25 Cutoff <25 ng/mL    Zolpidem <25 Cutoff <25 ng/mL    Zolpidem Metabolite (ZCA) <25 Cutoff <25 ng/mL    6-Acetylmorphine <25 Cutoff <25 ng/mL    Codeine <50 Cutoff <50 ng/mL    Hydrocodone <25 Cutoff <25 ng/mL    Hydromorphone <25 Cutoff <25 ng/mL    Morphine Urine <50 Cutoff <50 ng/mL    Norhydrocodone <25 Cutoff <25 ng/mL    Noroxycodone <25 Cutoff <25 ng/mL    Oxycodone <25 Cutoff <25 ng/mL    Oxymorphone <25 Cutoff <25 ng/mL    Tramadol <50 Cutoff <50 ng/mL    O-Desmethyltramadol <50  "Cutoff <50 ng/mL    Fentanyl <2.5 Cutoff<2.5 ng/mL    Norfentanyl <2.5 Cutoff<2.5 ng/mL    METHADONE CONFIRMATION,URINE <25 Cutoff <25 ng/mL    EDDP <25 Cutoff <25 ng/mL     Results are as expected.         Controlled Substance Agreement:  Date of the Last Agreement: 6/29/2023  Reviewed Controlled Substance Agreement including but not limited to the benefits, risks, and alternatives to treatment with a Controlled Substance medication(s).    Anticonvulsant:   What is the patient's goal of therapy? Prevent seizures  Is this being achieved with current treatment? yes    Activities of Daily Living:   Is your overall impression that this patient is benefiting (symptom reduction outweighs side effects) from Anticonvulsant therapy? Yes     1. Physical Functioning: Same  2. Family Relationship: Same  3. Social Relationship: Same  4. Mood: Same  5. Sleep Patterns: Same  6. Overall Function: Same      Review of Systems  Constitutional: Patient denies any fever, chills, loss of appetite, or unexplained weight loss.  Cardiovascular: Patient denies any chest pain, shortness of breath with exertion, tachycardia, palpitations, orthopnea, or paroxysmal nocturnal dyspnea.  Respiratory: Patient denies any cough, shortness breath, or wheezing.  Gastrointestinal: Patient denies any nausea, vomiting, diarrhea, constipation, melena, hematochezia, or reflux symptoms.  Skin: Denies any rashes or skin lesions.   Neurology: Patient denies any new motor or sensory losses.  Denies any numbness, tingling, weakness, and incoordination of the extremities.  Patient also denies any tremor, seizures, or gait instability.  Endocrinology: Denies any polyuria, polydipsia, polyphagia, or heat/cold intolerance.      Objective   /64   Pulse 61   Temp 36.7 °C (98 °F)   Ht 1.651 m (5' 5\")   Wt 60.9 kg (134 lb 4.8 oz)   SpO2 98%   BMI 22.35 kg/m²     Physical Exam  General Appearance: Alert and cooperative, in no acute distress, " well-developed/well-nourished.  Neck: Supple and without adenopathy or rigidity.  There is no JVD at 90° and no carotid bruits are noted.  There is no thyromegaly, thyroid tenderness, or palpable thyroid nodules.  Heart: Regular rate and rhythm without murmur or ectopy.  Respiratory: Lungs are clear to auscultation bilaterally with good air exchange.  Good respiratory effort and no accessory muscle use.  Skin: Good turgor, moist, warm and without rashes or lesions.  Neurological exam: Alert and oriented ×3, no tremor, normal gait.  Extremities: No clubbing, cyanosis, or edema      Assessment/Plan     HTN:  Blood pressure appears adequately controlled and we will continue with the current antihypertensive therapy.     Hyperlipidemia: Patient will continue with the current medication.  Dietary changes, exercise, and maintenance of healthy weight were discussed at length.     CAD: Patient underwent CABG x2 and left atrial appendage ligation during the 8/15/2022 CABG as his Coumadin was discontinued on 7/1/2022 as a result of a GI bleed.  Patient was started on clopidogrel per cardiology and has continued to tolerate it well.     Paroxysmal A. fib: Pt underwent left atrial appendage ligation after his anticoagulation was discontinued 7/2022 for a GI bleed.   Pt has maintained sinus rhythm and will be following with the surgical antiarrhythmia clinic.  We will continue the current medication and will continue to follow with cardiology as well.     Anemia: He was referred to hematology and was seen 12/9/2022.  PT WAS TREATED WITH IRON INFUSIONS AND HAS REMAINED ON ORAL THERAPY NOW.  We will continue current supplementation and pt will continue to follow with hematology.      COPD: Stable based on symptoms.  Smoking cessation encouraged.  Pt will continue the current medication.     BPH: Stable based on symptoms.  He is following with urology as well.     Seizure disorder: Stable based on symptoms.  Patient is to continue  on the phenobarbital.  We will continue to monitor.     Vit B12 deficiency: Stable on recent labs.  Pt to continue monthly B12 injections.     Vit D deficiency: Pt will continue the current vitamin D supplementation.     Macular degeneration:  Stable based on symptoms.  He was advised to continue to follow with ophthalmology but he has not been following.      Pulm HTN: Stable based on symptoms.  Continue to follow with cardiology.             PSA due 11/18/2023  COLONOSCOPY DUE 7/2024  MCAW due 12/2023          Orders Placed This Encounter   Procedures    Flu vaccine, quadrivalent, high-dose, preservative free, age 65y+ (FLUZONE)    Comprehensive Metabolic Panel    Lipid Panel    Prostate Specific Antigen, Screen    CBC and Auto Differential    Vitamin B12

## 2023-10-02 NOTE — PATIENT INSTRUCTIONS
Follow up in 3 months with labs to be done PRIOR.    It was a pleasure to see you today. Thank you for choosing us for your health care needs.    If you have lab or other testing completed and have not been informed of results within one week, please call the office for your results.    If you haven't done so, consider signing up for [facility], the [facility] personal health record. Ask the staff how you can get started.

## 2023-10-03 DIAGNOSIS — G40.909 SEIZURE DISORDER (MULTI): ICD-10-CM

## 2023-10-06 RX ORDER — PHENOBARBITAL 64.8 MG/1
64.8 TABLET ORAL 2 TIMES DAILY
Qty: 180 TABLET | Refills: 0 | OUTPATIENT
Start: 2023-10-06

## 2023-10-10 ENCOUNTER — OFFICE VISIT (OUTPATIENT)
Dept: CARDIOLOGY | Facility: CLINIC | Age: 68
End: 2023-10-10
Payer: MEDICARE

## 2023-10-10 VITALS
BODY MASS INDEX: 22.47 KG/M2 | HEART RATE: 60 BPM | DIASTOLIC BLOOD PRESSURE: 60 MMHG | WEIGHT: 135 LBS | SYSTOLIC BLOOD PRESSURE: 136 MMHG

## 2023-10-10 DIAGNOSIS — E78.2 MIXED HYPERLIPIDEMIA: ICD-10-CM

## 2023-10-10 DIAGNOSIS — I65.23 BILATERAL CAROTID ARTERY STENOSIS: ICD-10-CM

## 2023-10-10 DIAGNOSIS — I65.23 BILATERAL CAROTID ARTERY OCCLUSION: ICD-10-CM

## 2023-10-10 DIAGNOSIS — F17.200 CURRENT SMOKER: ICD-10-CM

## 2023-10-10 DIAGNOSIS — I10 PRIMARY HYPERTENSION: ICD-10-CM

## 2023-10-10 DIAGNOSIS — I25.10 ATHEROSCLEROSIS OF NATIVE CORONARY ARTERY OF NATIVE HEART, UNSPECIFIED WHETHER ANGINA PRESENT: Primary | ICD-10-CM

## 2023-10-10 PROCEDURE — 1159F MED LIST DOCD IN RCRD: CPT | Performed by: INTERNAL MEDICINE

## 2023-10-10 PROCEDURE — 3075F SYST BP GE 130 - 139MM HG: CPT | Performed by: INTERNAL MEDICINE

## 2023-10-10 PROCEDURE — 99214 OFFICE O/P EST MOD 30 MIN: CPT | Performed by: INTERNAL MEDICINE

## 2023-10-10 PROCEDURE — 3078F DIAST BP <80 MM HG: CPT | Performed by: INTERNAL MEDICINE

## 2023-10-10 RX ORDER — FUROSEMIDE 20 MG/1
20 TABLET ORAL DAILY
Qty: 90 TABLET | Refills: 3 | Status: SHIPPED | OUTPATIENT
Start: 2023-10-10 | End: 2023-12-19 | Stop reason: SDUPTHER

## 2023-10-10 RX ORDER — NITROGLYCERIN 0.4 MG/1
TABLET SUBLINGUAL
Qty: 25 TABLET | Refills: 5 | Status: SHIPPED | OUTPATIENT
Start: 2023-10-10

## 2023-10-10 RX ORDER — ATORVASTATIN CALCIUM 80 MG/1
80 TABLET, FILM COATED ORAL NIGHTLY
Qty: 90 TABLET | Refills: 3 | Status: SHIPPED | OUTPATIENT
Start: 2023-10-10 | End: 2023-12-05 | Stop reason: SDUPTHER

## 2023-10-10 RX ORDER — METOPROLOL SUCCINATE 50 MG/1
50 TABLET, EXTENDED RELEASE ORAL DAILY
Qty: 90 TABLET | Refills: 3 | Status: SHIPPED | OUTPATIENT
Start: 2023-10-10 | End: 2023-12-28 | Stop reason: SDUPTHER

## 2023-10-10 NOTE — PATIENT INSTRUCTIONS
Per Dr. Nguyen call [phone] for help to stop smoking    Per Dr Nguyen if you are having penile surgery you may hold Aspirin and Clopidogrel for 7 days prior but you must resume both post operatrively

## 2023-10-10 NOTE — PROGRESS NOTES
Patient:  Gold Summers  YOB: 1955  MRN: [id number]       Impression/Plan:     Diagnoses and all orders for this visit:  Atherosclerosis of native coronary artery of native heart, unspecified whether angina present-very high functional capacity and is now just over 1 year since bypass graft surgery.  Continue current statin.  Given his continued tobacco abuse continue on dual antiplatelet therapy has had no bleeding  -     Follow Up In Cardiology; Future  -     nitroglycerin (Nitrostat) 0.4 mg SL tablet; As directed as needed for chest pain.  May repeat every 5 min. X3.  Call 911 if 3rd dose needed    Bilateral carotid artery stenosis-no evidence of progressive disease  -     Follow Up In Cardiology; Future    Current smoker-he has been able to stop in the past recommended he call quit now such that he can get nicotine patches as these have helped him considerably in the past.  Reviewed with him that continued smoking will certainly cause progressive vascular disease    Mixed hyperlipidemia-cholesterol with excellent control and no adverse effect of statin therapy  -     atorvastatin (Lipitor) 80 mg tablet; Take 1 tablet (80 mg) by mouth once daily at bedtime.    Primary hypertension-controlled  -     Follow Up In Cardiology; Future  -     furosemide (Lasix) 20 mg tablet; Take 1 tablet (20 mg) by mouth once daily.  -     metoprolol succinate XL (Toprol-XL) 50 mg 24 hr tablet; Take 1 tablet (50 mg) by mouth once daily.    Paroxysmal atrial fibrillation: Occurred postoperatively.  None clinically since that time had Maze procedure which appears to have been quite successful    Preop penile implant: He is at low risk from a cardiovascular standpoint to undergo such a procedure.  He is antiplatelet agents aspirin Plavix can be held 7 days preop and resume postop      Chief Complaint/Active Symptoms:       Gold Summers is a 68 y.o. male who presents with coronary artery disease having had off-pump bypass  graft surgery 8/16/2022 R-LAD, L-OM, left atrial appendage ligation with 45 mm clip.  Also with surgical ablation lines right antral pulmonary vein left antral pulmonary vein.  Has been seen in postop surgical follow-up 9/5/2023 and was felt stable postoperatively.  No recurrence of atrial fibrillation documented at that visit.  Prior Holter monitor August 20 demonstrated no atrial fibrillation though frequent PACs occasional PVC.  Also with carotid artery disease CT angiography with 60% stenosis bilaterally with patent right carotid endarterectomy.       Cholesterol in June had been 142          He denies angina, dyspnea, palpitation, edema, lightheadedness or syncope.  He has had no symptoms of claudication or neurologic deterioration.  There have been no hospitalizations or emergency room visits since last office visit.    Very active, limited only due to mac degeneration.  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 Walks behind mower. No issues.     Still with 1 ppd. Smoking.      Review of Systems: Unremarkable except as noted above    Meds     Current Outpatient Medications   Medication Instructions    aspirin 81 mg EC tablet 1 tablet, oral, Daily    atorvastatin (LIPITOR) 80 mg, oral, Nightly    cholecalciferol (Vitamin D-3) 50 MCG (2000 UT) tablet 1 tablet, oral, Daily    clopidogrel (PLAVIX) 75 mg, oral, Daily    ferrous sulfate 325 (65 Fe) MG tablet TAKE 1 TABLET TWICE A DAY    furosemide (LASIX) 20 mg, oral, Daily    metoprolol succinate XL (TOPROL-XL) 50 mg, oral, Daily    nicotine (Nicoderm CQ) 7 mg/24 hr patch 1 patch, transdermal, Daily, Once finished with 14 mcg patch    nitroglycerin (Nitrostat) 0.4 mg SL tablet As directed as needed for chest pain.  May repeat every 5 min. X3.  Call 911 if 3rd dose needed    olmesartan (BENICAR) 20 mg, oral, Daily    pantoprazole (PROTONIX) 40 mg, oral, Daily before breakfast    PHENobarbitaL (LUMINAL) 64.8 mg, oral, 2 times daily    saw/vit E/sod jens/lyc/beta/pyg (PROSTATE  HEALTH ORAL) 1 tablet, oral, Daily        Allergies     Allergies   Allergen Reactions    Other Unknown    Penicillins Other     Myalgia, vomiting         Annotated Problems     Specialty Problems          Cardiology Problems    History of carotid endarterectomy     January 2021 right carotid endarterectomy with bovine patch         Smoker    PAF (paroxysmal atrial fibrillation) (CMS/HCC)    Essential (primary) hypertension    Bilateral carotid artery stenosis     2022 CT angiography with 60% stenosis bilaterally  January 2021 right carotid endarterectomy with bovine patch         CAD (coronary atherosclerotic disease)     8/16/2022 R-LAD, L-OM, left atrial appendage ligation with 45 mm clip.  Also with surgical ablation lines right antral pulmonary vein left antral pulmonary vein.    July 2022 cath: LVEDP 12-15.  Proximal LAD 75%.  LM-irregular.  Proximal CX-75%.  Dominant vessel.  RCA nondominant.         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    Iron deficiency anemia 03/22/2023    Vitamin B 12 deficiency 03/22/2023    Colonic polyp 03/22/2023    Anemia 02/04/2023    Bilateral carotid artery stenosis 02/04/2023    BPH with obstruction/lower urinary tract symptoms 02/04/2023    CAD (coronary atherosclerotic disease) 02/04/2023    Cholelithiases 02/04/2023    Closed fracture of left hip (CMS/HCC) 02/04/2023    Constipation 02/04/2023    COPD (chronic obstructive pulmonary disease) (CMS/HCC) 02/04/2023     Current smoker 02/04/2023    Dizziness 02/04/2023    Duodenal ulcer 02/04/2023    Mixed hyperlipidemia 02/04/2023    Inability to attain erection 02/04/2023    Low back pain 02/04/2023    Neck pain 02/04/2023    Penile implant failure, initial encounter (CMS/HCC) 02/04/2023    Peyronie disease 02/04/2023    Proximal humerus fracture 02/04/2023    Pulmonary hypertension (CMS/HCC) 02/04/2023    Rectal bleeding 02/04/2023    S/P CABG (coronary artery bypass graft) 02/04/2023    Shoulder pain, left 02/04/2023    Small vessel disease, cerebrovascular 02/04/2023    TIA (transient ischemic attack) 02/04/2023    Tubular adenoma of colon 02/04/2023    Ventricular tachycardia (paroxysmal) (CMS/HCC) 02/04/2023    Vitamin D deficiency 02/04/2023    Tricuspid insufficiency 02/04/2023    B12 deficiency anemia 02/04/2023    Colon polyps 02/04/2023    Essential (primary) hypertension 08/19/2022    Chronic pulmonary edema 08/19/2022    Acute blood loss anemia 07/03/2022    PAF (paroxysmal atrial fibrillation) (CMS/HCC) 07/03/2022    ELIANA (acute kidney injury) (CMS/HCC) 06/28/2022    Smoker 08/11/2021    Disorder of implanted penile prosthesis (CMS/HCC) 08/11/2021    Seizure disorder (CMS/HCC) 08/11/2021    History of carotid endarterectomy 08/10/2021    Closed fracture of neck of left femur (CMS/HCC) 08/09/2021    Fracture of neck of femur (CMS/HCC) 08/09/2021    Acute cholecystitis with chronic cholecystitis 04/15/2021    Chronic cholecystitis with calculus 02/05/2021    Erectile dysfunction due to arterial insufficiency 02/22/2012    Impotence of organic origin 02/22/2012    Malfunction of penile prosthesis (CMS/HCC) 02/22/2012       Objective:     Vitals:    10/10/23 0931   BP: 136/60   BP Location: Right arm   Patient Position: Sitting   Pulse: 60   Weight: 61.2 kg (135 lb)      Wt Readings from Last 4 Encounters:   10/10/23 61.2 kg (135 lb)   10/02/23 60.9 kg (134 lb 4.8 oz)   06/29/23 59.8 kg (131 lb 12.8 oz)   06/27/23 59.4 kg  (131 lb)           LAB:     Lab Results   Component Value Date    WBC 6.5 06/16/2023    HGB 15.3 06/16/2023    HCT 47.3 06/16/2023     06/16/2023    CHOL 142 06/27/2023    TRIG 127 06/27/2023    HDL 46.1 06/27/2023    ALT 19 02/13/2023    AST 19 02/13/2023     (L) 06/16/2023    K 4.7 06/16/2023     06/16/2023    CREATININE 1.04 06/16/2023    BUN 17 06/16/2023    CO2 24 06/16/2023    TSH 6.31 (H) 09/07/2022    PSA 2.35 11/17/2022    INR 0.9 06/16/2023    HGBA1C 6.4 (A) 11/23/2022       Diagnostic Studies:       Physical Exam     General Appearance: alert and oriented to person, place and time, in no acute distress  Cardiovascular: normal rate, regular rhythm, normal S1 and S2, no murmurs, rubs, clicks, or gallops,  no JVD  Pulmonary/Chest: clear to auscultation bilaterally- no wheezes, rales or rhonchi, normal air movement, no respiratory distress  Abdomen: soft, non-tender, non-distended, normal bowel sounds, no masses   Extremities: no cyanosis, clubbing or edema  Skin: warm and dry, no rash or erythema  Eyes: EOMI  Neck: supple and non-tender without mass, no thyromegaly   Neurological: alert, oriented, normal speech, no focal findings or movement disorder noted  Vascular: Bilateral carotid bruits.  Legs are warm.  Pulses intact distally        [unfilled]

## 2023-10-12 PROBLEM — R01.1 HEART MURMUR: Status: RESOLVED | Noted: 2023-02-04 | Resolved: 2023-10-12

## 2023-10-12 PROBLEM — I25.10 CORONARY ARTERY DISEASE INVOLVING NATIVE CORONARY ARTERY OF NATIVE HEART WITHOUT ANGINA PECTORIS: Status: RESOLVED | Noted: 2022-08-19 | Resolved: 2023-10-12

## 2023-10-12 PROBLEM — I47.29 NSVT (NONSUSTAINED VENTRICULAR TACHYCARDIA) (MULTI): Status: RESOLVED | Noted: 2022-07-03 | Resolved: 2023-10-12

## 2023-10-12 PROBLEM — I25.10 ARTERIOSCLEROSIS OF CORONARY ARTERY: Status: RESOLVED | Noted: 2021-08-09 | Resolved: 2023-10-12

## 2023-10-12 PROBLEM — I48.91 ATRIAL FIBRILLATION WITH RAPID VENTRICULAR RESPONSE (MULTI): Status: RESOLVED | Noted: 2021-08-09 | Resolved: 2023-10-12

## 2023-10-12 PROBLEM — I21.4 NON-ST ELEVATION MI (NSTEMI) (MULTI): Status: RESOLVED | Noted: 2022-07-03 | Resolved: 2023-10-12

## 2023-10-12 PROBLEM — I48.0 PAROXYSMAL ATRIAL FIBRILLATION (MULTI): Status: RESOLVED | Noted: 2023-02-04 | Resolved: 2023-10-12

## 2023-10-23 PROBLEM — S42.209A PROXIMAL HUMERUS FRACTURE: Status: RESOLVED | Noted: 2023-02-04 | Resolved: 2023-10-23

## 2023-10-23 PROBLEM — R42 DIZZINESS: Status: RESOLVED | Noted: 2023-02-04 | Resolved: 2023-10-23

## 2023-10-23 PROBLEM — K62.5 RECTAL BLEEDING: Status: RESOLVED | Noted: 2023-02-04 | Resolved: 2023-10-23

## 2023-10-23 PROBLEM — T83.410A: Status: RESOLVED | Noted: 2023-02-04 | Resolved: 2023-10-23

## 2023-11-07 DIAGNOSIS — D50.9 IRON DEFICIENCY ANEMIA, UNSPECIFIED: ICD-10-CM

## 2023-11-08 RX ORDER — FERROUS SULFATE 325(65) MG
65 TABLET ORAL
Qty: 90 TABLET | Refills: 0 | Status: SHIPPED | OUTPATIENT
Start: 2023-11-08 | End: 2024-03-04 | Stop reason: SDUPTHER

## 2023-12-05 DIAGNOSIS — I65.23 BILATERAL CAROTID ARTERY OCCLUSION: ICD-10-CM

## 2023-12-11 RX ORDER — ATORVASTATIN CALCIUM 80 MG/1
80 TABLET, FILM COATED ORAL NIGHTLY
Qty: 90 TABLET | Refills: 0 | Status: SHIPPED | OUTPATIENT
Start: 2023-12-11 | End: 2024-03-04 | Stop reason: SDUPTHER

## 2023-12-19 ENCOUNTER — OFFICE VISIT (OUTPATIENT)
Dept: PRIMARY CARE | Facility: CLINIC | Age: 68
End: 2023-12-19
Payer: MEDICARE

## 2023-12-19 VITALS
SYSTOLIC BLOOD PRESSURE: 120 MMHG | OXYGEN SATURATION: 98 % | HEIGHT: 65 IN | BODY MASS INDEX: 21.71 KG/M2 | HEART RATE: 59 BPM | WEIGHT: 130.3 LBS | TEMPERATURE: 98 F | DIASTOLIC BLOOD PRESSURE: 58 MMHG

## 2023-12-19 DIAGNOSIS — I27.20 PULMONARY HYPERTENSION (MULTI): ICD-10-CM

## 2023-12-19 DIAGNOSIS — I48.0 PAF (PAROXYSMAL ATRIAL FIBRILLATION) (MULTI): ICD-10-CM

## 2023-12-19 DIAGNOSIS — J44.9 CHRONIC OBSTRUCTIVE PULMONARY DISEASE, UNSPECIFIED COPD TYPE (MULTI): ICD-10-CM

## 2023-12-19 DIAGNOSIS — E55.9 VITAMIN D DEFICIENCY: ICD-10-CM

## 2023-12-19 DIAGNOSIS — E78.2 MIXED HYPERLIPIDEMIA: ICD-10-CM

## 2023-12-19 DIAGNOSIS — N40.1 BPH WITH OBSTRUCTION/LOWER URINARY TRACT SYMPTOMS: ICD-10-CM

## 2023-12-19 DIAGNOSIS — I10 ESSENTIAL (PRIMARY) HYPERTENSION: ICD-10-CM

## 2023-12-19 DIAGNOSIS — Z00.00 MEDICARE ANNUAL WELLNESS VISIT, SUBSEQUENT: Primary | ICD-10-CM

## 2023-12-19 DIAGNOSIS — D50.9 IRON DEFICIENCY ANEMIA, UNSPECIFIED IRON DEFICIENCY ANEMIA TYPE: ICD-10-CM

## 2023-12-19 DIAGNOSIS — E53.8 VITAMIN B 12 DEFICIENCY: ICD-10-CM

## 2023-12-19 DIAGNOSIS — N13.8 BPH WITH OBSTRUCTION/LOWER URINARY TRACT SYMPTOMS: ICD-10-CM

## 2023-12-19 DIAGNOSIS — I25.10 CORONARY ARTERY DISEASE INVOLVING NATIVE CORONARY ARTERY OF NATIVE HEART WITHOUT ANGINA PECTORIS: ICD-10-CM

## 2023-12-19 DIAGNOSIS — G40.909 SEIZURE DISORDER (MULTI): ICD-10-CM

## 2023-12-19 PROCEDURE — 1159F MED LIST DOCD IN RCRD: CPT | Performed by: FAMILY MEDICINE

## 2023-12-19 PROCEDURE — 99214 OFFICE O/P EST MOD 30 MIN: CPT | Performed by: FAMILY MEDICINE

## 2023-12-19 PROCEDURE — 3074F SYST BP LT 130 MM HG: CPT | Performed by: FAMILY MEDICINE

## 2023-12-19 PROCEDURE — 3078F DIAST BP <80 MM HG: CPT | Performed by: FAMILY MEDICINE

## 2023-12-19 PROCEDURE — G0439 PPPS, SUBSEQ VISIT: HCPCS | Performed by: FAMILY MEDICINE

## 2023-12-19 PROCEDURE — 1160F RVW MEDS BY RX/DR IN RCRD: CPT | Performed by: FAMILY MEDICINE

## 2023-12-19 PROCEDURE — 1170F FXNL STATUS ASSESSED: CPT | Performed by: FAMILY MEDICINE

## 2023-12-19 RX ORDER — PHENOBARBITAL 64.8 MG/1
64.8 TABLET ORAL 2 TIMES DAILY
Qty: 180 TABLET | Refills: 0 | Status: SHIPPED | OUTPATIENT
Start: 2023-12-19 | End: 2024-03-26 | Stop reason: SDUPTHER

## 2023-12-19 RX ORDER — FUROSEMIDE 20 MG/1
20 TABLET ORAL DAILY
Qty: 90 TABLET | Refills: 3 | Status: SHIPPED | OUTPATIENT
Start: 2023-12-19 | End: 2024-03-11 | Stop reason: SDUPTHER

## 2023-12-19 ASSESSMENT — PATIENT HEALTH QUESTIONNAIRE - PHQ9
SUM OF ALL RESPONSES TO PHQ9 QUESTIONS 1 AND 2: 0
1. LITTLE INTEREST OR PLEASURE IN DOING THINGS: NOT AT ALL
2. FEELING DOWN, DEPRESSED OR HOPELESS: NOT AT ALL

## 2023-12-19 ASSESSMENT — ACTIVITIES OF DAILY LIVING (ADL)
GROCERY_SHOPPING: INDEPENDENT
DRESSING: INDEPENDENT
MANAGING_FINANCES: INDEPENDENT
BATHING: INDEPENDENT
DOING_HOUSEWORK: INDEPENDENT
TAKING_MEDICATION: NEEDS ASSISTANCE

## 2023-12-19 NOTE — PROGRESS NOTES
Subjective   Reason for Visit: Gold Summers is an 68 y.o. male here for a Medicare Wellness visit.     Past Medical, Surgical, and Family History reviewed and updated in chart.    Reviewed all medications by prescribing practitioner or clinical pharmacist (such as prescriptions, OTCs, herbal therapies and supplements) and documented in the medical record.    HPI    Patient states that he has been concerned about his eyesight as he has been dealing with macular degeneration and has been having trouble seeing because of this.    No other concerns     Pt has anemia.   He was previously referred to GI and hematology for poor iron absorption  Was treated with IV iron in the past.  Currently on oral iron.     Patient has hypertension.  He does monitor readings at home. BP typically 130s/70-80s.  He denies CP, SOB at this time.  He is taking Metoprolol.      He has hyperlipidemia.  He tries to limit the amount of fatty foods and high cholesterol foods that he consumes in his diet.  Patient states that he has been compliant with the dosing of his statin therapy.     Pt has CAD.  Presently denies any chest pain shortness of breath.  9/16/2022: Underwent off-pump coronary bypass with R bilateral pulmonary vein isolation LENCHO to LAD, and LIMA to OM, left atrial appendage ligation with a 45 mm clip by Dr. Sawyer Ibrahim  Surgical ablation lines created:  1. Right antral pulmonary vein isolation  2. Left antral pulmonary vein isolation  3. Left atrial appendage ligation     Antiarrhythmic therapy: Metoprolol succinate 50 mg daily  Antiplatelet therapy: Aspirin 81 mg daily, clopidogrel 75 mg daily  Anticoagulation therapy: none     He had atrial fibrillation.  Pt has maintained sinus rhythm after surgery.  He was not restarted on anticoagulation after left appendage ligation 9/2022.     He has a seizure disorder.  He has not had a seizure for years and has been maintained on phenobarbital without noted side effects.     He has  vitamin D deficiency.  Patient states that he is compliant with his vitamin D supplementation.     He unfortunately continues to smoke despite my advice and encouragement.      Pt has carotid artery stenosis.  Patient underwent right CEA 1/27/2021 with Dr. Taylor.     Pt has COPD.  Pt is currently treated with Advair.  He feels his breathing is at baseline and he denies any exacerbations since his last visit.    Patient Self Assessment of Health Status  Patient Self Assessment: Fair    Nutrition and Exercise  Current Diet: Unhealthy Diet  Adequate Fluid Intake: No  Caffeine: Yes  Exercise Frequency: Infrequently    Functional Ability/Level of Safety  Cognitive Impairment Observed: No cognitive impairment observed  Cognitive Impairment Reported: No cognitive impairment reported by patient or family    Home Safety Risk Factors: None    Patient Care Team:  Miguel Garcia DO as PCP - General  Miguel Garcia DO as PCP - [facility] ACO Attributed Provider       OARRS:  Miguel Garcia DO on 12/19/2023  3:26 PM  I have personally reviewed the OARRS report for Gold Summers. I have considered the risks of abuse, dependence, addiction and diversion and I believe that it is clinically appropriate for Gold Summers to be prescribed this medication    Is the patient prescribed a combination of a benzodiazepine and opioid?  No    Last Urine Drug Screen / ordered today: No  Recent Results (from the past 8760 hour(s))   OPIATE/OPIOID/BENZO PRESCRIPTION COMPLIANCE    Collection Time: 06/29/23 10:53 AM   Result Value Ref Range    DRUG SCREEN COMMENT URINE SEE BELOW     Creatine, Urine 42.2 mg/dL    Amphetamine Screen, Urine PRESUMPTIVE NEGATIVE NEGATIVE    Barbiturate Screen, Urine PRESUMPTIVE POSITIVE (A) NEGATIVE    Cannabinoid Screen, Urine PRESUMPTIVE NEGATIVE NEGATIVE    Cocaine Screen, Urine PRESUMPTIVE NEGATIVE NEGATIVE    PCP Screen, Urine PRESUMPTIVE NEGATIVE NEGATIVE    7-Aminoclonazepam <25 Cutoff <25 ng/mL     Alpha-Hydroxyalprazolam <25 Cutoff <25 ng/mL    Alpha-Hydroxymidazolam <25 Cutoff <25 ng/mL    Alprazolam <25 Cutoff <25 ng/mL    Chlordiazepoxide <25 Cutoff <25 ng/mL    Clonazepam <25 Cutoff <25 ng/mL    Diazepam <25 Cutoff <25 ng/mL    Lorazepam <25 Cutoff <25 ng/mL    Midazolam <25 Cutoff <25 ng/mL    Nordiazepam <25 Cutoff <25 ng/mL    Oxazepam <25 Cutoff <25 ng/mL    Temazepam <25 Cutoff <25 ng/mL    Zolpidem <25 Cutoff <25 ng/mL    Zolpidem Metabolite (ZCA) <25 Cutoff <25 ng/mL    6-Acetylmorphine <25 Cutoff <25 ng/mL    Codeine <50 Cutoff <50 ng/mL    Hydrocodone <25 Cutoff <25 ng/mL    Hydromorphone <25 Cutoff <25 ng/mL    Morphine Urine <50 Cutoff <50 ng/mL    Norhydrocodone <25 Cutoff <25 ng/mL    Noroxycodone <25 Cutoff <25 ng/mL    Oxycodone <25 Cutoff <25 ng/mL    Oxymorphone <25 Cutoff <25 ng/mL    Tramadol <50 Cutoff <50 ng/mL    O-Desmethyltramadol <50 Cutoff <50 ng/mL    Fentanyl <2.5 Cutoff<2.5 ng/mL    Norfentanyl <2.5 Cutoff<2.5 ng/mL    METHADONE CONFIRMATION,URINE <25 Cutoff <25 ng/mL    EDDP <25 Cutoff <25 ng/mL     Results are as expected.         Controlled Substance Agreement:  Date of the Last Agreement: 6/29/2023  Reviewed Controlled Substance Agreement including but not limited to the benefits, risks, and alternatives to treatment with a Controlled Substance medication(s).    Anticonvulsant:   What is the patient's goal of therapy? Prevent seizures  Is this being achieved with current treatment? yes    Activities of Daily Living:   Is your overall impression that this patient is benefiting (symptom reduction outweighs side effects) from Anticonvulsant therapy? Yes     1. Physical Functioning: Same  2. Family Relationship: Same  3. Social Relationship: Same  4. Mood: Same  5. Sleep Patterns: Same  6. Overall Function: Same          Review of Systems  Constitutional: Patient denies any fever, chills, loss of appetite, or unexplained weight loss.  Cardiovascular: Patient denies any chest  "pain, shortness of breath with exertion, tachycardia, palpitations, orthopnea, or paroxysmal nocturnal dyspnea.  Respiratory: Patient denies any cough, shortness breath, or wheezing.  Skin: Denies any rashes or skin lesions.   Neurology: Patient denies any new motor or sensory losses.  Denies any numbness, tingling, weakness, and incoordination of the extremities.  Patient also denies any tremor, seizures, or gait instability.  Endocrinology: Denies any polyuria, polydipsia, polyphagia, or heat/cold intolerance.      Objective   Vitals:  /58   Pulse 59   Temp 36.7 °C (98 °F)   Ht 1.651 m (5' 5\")   Wt 59.1 kg (130 lb 4.8 oz)   SpO2 98%   BMI 21.68 kg/m²       Physical Exam  General Appearance: Alert and cooperative, in no acute distress, well-developed/well-nourished.  Neck: Supple and without adenopathy or rigidity.  There is no JVD at 90° and no carotid bruit noted on the left.  There is a right carotid bruit.  There is no thyromegaly, thyroid tenderness, or palpable thyroid nodules.    Heart: Regular rate and rhythm with 1/6 murmur at RSB, no ectopy.    Respiratory: Lungs are clear to auscultation bilaterally with good air exchange.  Good respiratory effort and no accessory muscle use.  Skin: Good turgor, moist, warm and without rashes or lesions.  Neurological exam: Alert and oriented ×3, no tremor, normal gait.  Extremities: No clubbing, cyanosis, or edema      Assessment/Plan     MEDICARE ANNUAL WELLNESS EXAM: Appropriate screenings for the patient's current age were discussed at length.  I encouraged a low-fat/low-cholesterol diet and routine exercise.        HTN:  Blood pressure appears adequately controlled and we will continue with the current antihypertensive therapy.     Hyperlipidemia: Patient will continue with the current medication.  Dietary changes, exercise, and maintenance of healthy weight were discussed at length.     CAD: Patient underwent CABG x2 and left atrial appendage ligation " during the 8/15/2022 CABG as his Coumadin was discontinued on 7/1/2022 as a result of a GI bleed.  Patient was started on clopidogrel per cardiology and has continued to tolerate it well.     Paroxysmal A. fib: Pt underwent left atrial appendage ligation after his anticoagulation was discontinued 7/2022 for a GI bleed.   Pt has maintained sinus rhythm.W  e will continue the current medication and will continue to follow with cardiology as well.     Anemia: He was referred to hematology and was seen 12/9/2022.  PT WAS TREATED WITH IRON INFUSIONS AND HAS REMAINED ON ORAL THERAPY NOW.  We will continue current supplementation and pt will continue to follow with hematology.      COPD: Stable based on symptoms.  Smoking cessation encouraged.  Pt will continue the current medication.     BPH: Stable based on symptoms.  He is following with urology as well.     Seizure disorder: Stable based on symptoms.  Patient is to continue on the phenobarbital.  We will continue to monitor.     Vit B12 deficiency: Stable on recent labs.  Pt to continue monthly B12 injections.     Vit D deficiency: Pt will continue the current vitamin D supplementation.     Pulm HTN: Stable based on symptoms.  Continue to follow with cardiology.                 PSA due 11/18/2023  COLONOSCOPY DUE 7/2024  MCAW due 12/2024         Requested Prescriptions     Signed Prescriptions Disp Refills    PHENobarbitaL 64.8 mg tablet 180 tablet 0     Sig: Take 1 tablet (64.8 mg) by mouth 2 times a day.    furosemide (Lasix) 20 mg tablet 90 tablet 3     Sig: Take 1 tablet (20 mg) by mouth once daily.

## 2023-12-19 NOTE — PATIENT INSTRUCTIONS
HAVE FASTING LABS DONE SOON.    RSV vaccine recommended.    Follow up in 3 months.    It was a pleasure to see you today. Thank you for choosing us for your health care needs.    If you have lab or other testing completed and have not been informed of results within one week, please call the office for your results.    If you haven't done so, consider signing up for [facility], the [facility] personal health record. Ask the staff how you can get started.

## 2023-12-28 DIAGNOSIS — I10 PRIMARY HYPERTENSION: ICD-10-CM

## 2023-12-28 DIAGNOSIS — K92.2 GASTROINTESTINAL HEMORRHAGE, UNSPECIFIED GASTROINTESTINAL HEMORRHAGE TYPE: ICD-10-CM

## 2023-12-29 RX ORDER — METOPROLOL SUCCINATE 50 MG/1
50 TABLET, EXTENDED RELEASE ORAL DAILY
Qty: 90 TABLET | Refills: 0 | Status: SHIPPED | OUTPATIENT
Start: 2023-12-29 | End: 2024-03-26 | Stop reason: SDUPTHER

## 2024-01-02 DIAGNOSIS — K92.2 GASTROINTESTINAL HEMORRHAGE, UNSPECIFIED GASTROINTESTINAL HEMORRHAGE TYPE: ICD-10-CM

## 2024-01-04 RX ORDER — PANTOPRAZOLE SODIUM 40 MG/1
40 TABLET, DELAYED RELEASE ORAL
Qty: 90 TABLET | Refills: 0 | Status: SHIPPED | OUTPATIENT
Start: 2024-01-04 | End: 2024-03-26 | Stop reason: SDUPTHER

## 2024-03-04 DIAGNOSIS — I65.23 BILATERAL CAROTID ARTERY OCCLUSION: ICD-10-CM

## 2024-03-04 DIAGNOSIS — D50.9 IRON DEFICIENCY ANEMIA, UNSPECIFIED: ICD-10-CM

## 2024-03-04 RX ORDER — FERROUS SULFATE 325(65) MG
65 TABLET ORAL
Qty: 90 TABLET | Refills: 0 | Status: SHIPPED | OUTPATIENT
Start: 2024-03-04

## 2024-03-04 RX ORDER — ATORVASTATIN CALCIUM 80 MG/1
80 TABLET, FILM COATED ORAL NIGHTLY
Qty: 90 TABLET | Refills: 0 | Status: SHIPPED | OUTPATIENT
Start: 2024-03-04

## 2024-03-08 DIAGNOSIS — I10 ESSENTIAL (PRIMARY) HYPERTENSION: ICD-10-CM

## 2024-03-08 DIAGNOSIS — I25.10 ARTERIOSCLEROSIS OF CORONARY ARTERY: ICD-10-CM

## 2024-03-11 RX ORDER — CLOPIDOGREL BISULFATE 75 MG/1
75 TABLET ORAL DAILY
Qty: 90 TABLET | Refills: 0 | OUTPATIENT
Start: 2024-03-11

## 2024-03-11 RX ORDER — CLOPIDOGREL BISULFATE 75 MG/1
75 TABLET ORAL DAILY
Qty: 90 TABLET | Refills: 0 | Status: SHIPPED | OUTPATIENT
Start: 2024-03-11 | End: 2024-06-10

## 2024-03-11 RX ORDER — FUROSEMIDE 20 MG/1
20 TABLET ORAL DAILY
Qty: 90 TABLET | Refills: 0 | Status: SHIPPED | OUTPATIENT
Start: 2024-03-11

## 2024-03-11 NOTE — TELEPHONE ENCOUNTER
Medication refused due to failing protocol.    Requested Prescriptions   Pending Prescriptions Disp Refills    clopidogrel (Plavix) 75 mg tablet [Pharmacy Med Name: CLOPIDOGREL 75 MG TABLET] 90 tablet 0     Sig: TAKE 1 TABLET BY MOUTH EVERY DAY       Plavix Protocol Passed - 3/8/2024 12:19 AM        Passed - Visit with relevant provider in past 9 months or upcoming 90 days     Recent Visits  Date Type Provider Dept   12/19/23 Office Visit Miguel Garcia, DO Do [id number]   10/02/23 Office Visit Miguel Garcia, DO Do [id number]   06/29/23 Office Visit Miguel Garcia, DO Do [id number]   Showing recent visits within past 270 days and meeting all other requirements  Future Appointments  Date Type Provider Dept   03/19/24 Appointment Miguel Garcia DO Do [id number]   Showing future appointments within next 90 days and meeting all other requirements            Passed - No conflicting PPI on medication list        Passed - Medication not refilled in past 45 days (1.5 months)     No matching medication orders between 1/26/2024 12:40 PM and 3/11/2024 12:40 PM

## 2024-03-13 DIAGNOSIS — K92.2 GASTROINTESTINAL HEMORRHAGE, UNSPECIFIED GASTROINTESTINAL HEMORRHAGE TYPE: ICD-10-CM

## 2024-03-13 DIAGNOSIS — I10 PRIMARY HYPERTENSION: ICD-10-CM

## 2024-03-13 RX ORDER — OLMESARTAN MEDOXOMIL 20 MG/1
20 TABLET ORAL DAILY
Qty: 90 TABLET | Refills: 0 | Status: SHIPPED | OUTPATIENT
Start: 2024-03-13 | End: 2024-06-11

## 2024-03-19 ENCOUNTER — APPOINTMENT (OUTPATIENT)
Dept: PRIMARY CARE | Facility: CLINIC | Age: 69
End: 2024-03-19
Payer: MEDICARE

## 2024-03-26 ENCOUNTER — LAB (OUTPATIENT)
Dept: LAB | Facility: LAB | Age: 69
End: 2024-03-26
Payer: MEDICARE

## 2024-03-26 DIAGNOSIS — G40.909 SEIZURE DISORDER (MULTI): ICD-10-CM

## 2024-03-26 DIAGNOSIS — D50.9 IRON DEFICIENCY ANEMIA, UNSPECIFIED IRON DEFICIENCY ANEMIA TYPE: ICD-10-CM

## 2024-03-26 DIAGNOSIS — I10 ESSENTIAL (PRIMARY) HYPERTENSION: ICD-10-CM

## 2024-03-26 DIAGNOSIS — E53.8 VITAMIN B 12 DEFICIENCY: ICD-10-CM

## 2024-03-26 DIAGNOSIS — E78.2 MIXED HYPERLIPIDEMIA: ICD-10-CM

## 2024-03-26 DIAGNOSIS — K92.2 GASTROINTESTINAL HEMORRHAGE, UNSPECIFIED GASTROINTESTINAL HEMORRHAGE TYPE: ICD-10-CM

## 2024-03-26 DIAGNOSIS — Z12.5 PROSTATE CANCER SCREENING: ICD-10-CM

## 2024-03-26 DIAGNOSIS — I10 PRIMARY HYPERTENSION: ICD-10-CM

## 2024-03-26 LAB
ALBUMIN SERPL BCP-MCNC: 4.5 G/DL (ref 3.4–5)
ALP SERPL-CCNC: 152 U/L (ref 33–136)
ALT SERPL W P-5'-P-CCNC: 30 U/L (ref 10–52)
ANION GAP SERPL CALC-SCNC: 11 MMOL/L (ref 10–20)
AST SERPL W P-5'-P-CCNC: 28 U/L (ref 9–39)
BASOPHILS # BLD AUTO: 0.04 X10*3/UL (ref 0–0.1)
BASOPHILS NFR BLD AUTO: 0.5 %
BILIRUB SERPL-MCNC: 0.5 MG/DL (ref 0–1.2)
BUN SERPL-MCNC: 15 MG/DL (ref 6–23)
CALCIUM SERPL-MCNC: 9.4 MG/DL (ref 8.6–10.3)
CHLORIDE SERPL-SCNC: 100 MMOL/L (ref 98–107)
CHOLEST SERPL-MCNC: 136 MG/DL (ref 0–199)
CHOLESTEROL/HDL RATIO: 3.2
CO2 SERPL-SCNC: 29 MMOL/L (ref 21–32)
CREAT SERPL-MCNC: 1.18 MG/DL (ref 0.5–1.3)
EGFRCR SERPLBLD CKD-EPI 2021: 67 ML/MIN/1.73M*2
EOSINOPHIL # BLD AUTO: 0.3 X10*3/UL (ref 0–0.7)
EOSINOPHIL NFR BLD AUTO: 3.8 %
ERYTHROCYTE [DISTWIDTH] IN BLOOD BY AUTOMATED COUNT: 13.3 % (ref 11.5–14.5)
GLUCOSE SERPL-MCNC: 77 MG/DL (ref 74–99)
HCT VFR BLD AUTO: 48.1 % (ref 41–52)
HDLC SERPL-MCNC: 42.8 MG/DL
HGB BLD-MCNC: 16.2 G/DL (ref 13.5–17.5)
IMM GRANULOCYTES # BLD AUTO: 0.01 X10*3/UL (ref 0–0.7)
IMM GRANULOCYTES NFR BLD AUTO: 0.1 % (ref 0–0.9)
LDLC SERPL CALC-MCNC: 72 MG/DL
LYMPHOCYTES # BLD AUTO: 1.64 X10*3/UL (ref 1.2–4.8)
LYMPHOCYTES NFR BLD AUTO: 20.9 %
MCH RBC QN AUTO: 31.5 PG (ref 26–34)
MCHC RBC AUTO-ENTMCNC: 33.7 G/DL (ref 32–36)
MCV RBC AUTO: 94 FL (ref 80–100)
MONOCYTES # BLD AUTO: 0.65 X10*3/UL (ref 0.1–1)
MONOCYTES NFR BLD AUTO: 8.3 %
NEUTROPHILS # BLD AUTO: 5.2 X10*3/UL (ref 1.2–7.7)
NEUTROPHILS NFR BLD AUTO: 66.4 %
NON HDL CHOLESTEROL: 93 MG/DL (ref 0–149)
NRBC BLD-RTO: 0 /100 WBCS (ref 0–0)
PLATELET # BLD AUTO: 247 X10*3/UL (ref 150–450)
POTASSIUM SERPL-SCNC: 5.1 MMOL/L (ref 3.5–5.3)
PROT SERPL-MCNC: 6.9 G/DL (ref 6.4–8.2)
PSA SERPL-MCNC: 2.8 NG/ML
RBC # BLD AUTO: 5.14 X10*6/UL (ref 4.5–5.9)
SODIUM SERPL-SCNC: 135 MMOL/L (ref 136–145)
TRIGL SERPL-MCNC: 107 MG/DL (ref 0–149)
VIT B12 SERPL-MCNC: 263 PG/ML (ref 211–911)
VLDL: 21 MG/DL (ref 0–40)
WBC # BLD AUTO: 7.8 X10*3/UL (ref 4.4–11.3)

## 2024-03-26 PROCEDURE — 85025 COMPLETE CBC W/AUTO DIFF WBC: CPT

## 2024-03-26 PROCEDURE — 36415 COLL VENOUS BLD VENIPUNCTURE: CPT

## 2024-03-26 PROCEDURE — G0103 PSA SCREENING: HCPCS

## 2024-03-26 PROCEDURE — 80061 LIPID PANEL: CPT

## 2024-03-26 PROCEDURE — 82607 VITAMIN B-12: CPT

## 2024-03-26 PROCEDURE — 80053 COMPREHEN METABOLIC PANEL: CPT

## 2024-03-28 RX ORDER — METOPROLOL SUCCINATE 50 MG/1
50 TABLET, EXTENDED RELEASE ORAL DAILY
Qty: 90 TABLET | Refills: 0 | Status: SHIPPED | OUTPATIENT
Start: 2024-03-28 | End: 2024-04-02 | Stop reason: SDUPTHER

## 2024-03-28 RX ORDER — PANTOPRAZOLE SODIUM 40 MG/1
40 TABLET, DELAYED RELEASE ORAL
Qty: 90 TABLET | Refills: 0 | Status: SHIPPED | OUTPATIENT
Start: 2024-03-28

## 2024-03-28 RX ORDER — PHENOBARBITAL 64.8 MG/1
64.8 TABLET ORAL 2 TIMES DAILY
Qty: 180 TABLET | Refills: 0 | Status: SHIPPED | OUTPATIENT
Start: 2024-03-28

## 2024-04-02 ENCOUNTER — OFFICE VISIT (OUTPATIENT)
Dept: PRIMARY CARE | Facility: CLINIC | Age: 69
End: 2024-04-02
Payer: MEDICARE

## 2024-04-02 VITALS
HEIGHT: 65 IN | WEIGHT: 131.2 LBS | OXYGEN SATURATION: 98 % | TEMPERATURE: 97.4 F | BODY MASS INDEX: 21.86 KG/M2 | DIASTOLIC BLOOD PRESSURE: 59 MMHG | SYSTOLIC BLOOD PRESSURE: 134 MMHG | HEART RATE: 58 BPM

## 2024-04-02 DIAGNOSIS — I25.10 CORONARY ARTERY DISEASE INVOLVING NATIVE CORONARY ARTERY OF NATIVE HEART WITHOUT ANGINA PECTORIS: ICD-10-CM

## 2024-04-02 DIAGNOSIS — H35.3221 EXUDATIVE AGE-RELATED MACULAR DEGENERATION, LEFT EYE, WITH ACTIVE CHOROIDAL NEOVASCULARIZATION (MULTI): ICD-10-CM

## 2024-04-02 DIAGNOSIS — E53.8 VITAMIN B 12 DEFICIENCY: ICD-10-CM

## 2024-04-02 DIAGNOSIS — E78.2 MIXED HYPERLIPIDEMIA: ICD-10-CM

## 2024-04-02 DIAGNOSIS — N40.1 BPH WITH OBSTRUCTION/LOWER URINARY TRACT SYMPTOMS: ICD-10-CM

## 2024-04-02 DIAGNOSIS — I10 PRIMARY HYPERTENSION: Primary | ICD-10-CM

## 2024-04-02 DIAGNOSIS — D50.9 IRON DEFICIENCY ANEMIA, UNSPECIFIED IRON DEFICIENCY ANEMIA TYPE: ICD-10-CM

## 2024-04-02 DIAGNOSIS — I48.0 PAF (PAROXYSMAL ATRIAL FIBRILLATION) (MULTI): ICD-10-CM

## 2024-04-02 DIAGNOSIS — J44.9 CHRONIC OBSTRUCTIVE PULMONARY DISEASE, UNSPECIFIED COPD TYPE (MULTI): ICD-10-CM

## 2024-04-02 DIAGNOSIS — I27.20 PULMONARY HYPERTENSION (MULTI): ICD-10-CM

## 2024-04-02 DIAGNOSIS — G40.909 SEIZURE DISORDER (MULTI): ICD-10-CM

## 2024-04-02 DIAGNOSIS — E55.9 VITAMIN D DEFICIENCY: ICD-10-CM

## 2024-04-02 DIAGNOSIS — N13.8 BPH WITH OBSTRUCTION/LOWER URINARY TRACT SYMPTOMS: ICD-10-CM

## 2024-04-02 PROCEDURE — 1159F MED LIST DOCD IN RCRD: CPT | Performed by: FAMILY MEDICINE

## 2024-04-02 PROCEDURE — 3075F SYST BP GE 130 - 139MM HG: CPT | Performed by: FAMILY MEDICINE

## 2024-04-02 PROCEDURE — 1160F RVW MEDS BY RX/DR IN RCRD: CPT | Performed by: FAMILY MEDICINE

## 2024-04-02 PROCEDURE — 99214 OFFICE O/P EST MOD 30 MIN: CPT | Performed by: FAMILY MEDICINE

## 2024-04-02 PROCEDURE — 3078F DIAST BP <80 MM HG: CPT | Performed by: FAMILY MEDICINE

## 2024-04-02 RX ORDER — METOPROLOL SUCCINATE 50 MG/1
50 TABLET, EXTENDED RELEASE ORAL DAILY
Qty: 90 TABLET | Refills: 0 | Status: SHIPPED | OUTPATIENT
Start: 2024-04-02

## 2024-04-02 RX ORDER — LANOLIN ALCOHOL/MO/W.PET/CERES
1000 CREAM (GRAM) TOPICAL DAILY
Start: 2024-04-02 | End: 2025-04-02

## 2024-04-02 NOTE — PROGRESS NOTES
Subjective   Patient ID: Gold Summers is a 68 y.o. male who presents for Follow-up.    HPI     No new concerns   Labs: 03/26/2023  Colonoscopy: 2021        Pt has anemia.   He was previously referred to GI and hematology for poor iron absorption  Was treated with IV iron in the past.  Currently on oral iron.     Patient has hypertension.  He does monitor readings at home. BP typically 130s/70-80s.  He denies CP, SOB at this time.  He is taking Metoprolol.      He has hyperlipidemia.  He tries to limit the amount of fatty foods and high cholesterol foods that he consumes in his diet.  Patient states that he has been compliant with the dosing of his statin therapy.     Pt has CAD.  Presently denies any chest pain shortness of breath.  9/16/2022: Underwent off-pump coronary bypass with R bilateral pulmonary vein isolation LENCHO to LAD, and LIMA to OM, left atrial appendage ligation with a 45 mm clip by Dr. Sawyer Ibrahim  Surgical ablation lines created:  1. Right antral pulmonary vein isolation  2. Left antral pulmonary vein isolation  3. Left atrial appendage ligation     Antiarrhythmic therapy: Metoprolol succinate 50 mg daily  Antiplatelet therapy: Aspirin 81 mg daily, clopidogrel 75 mg daily  Anticoagulation therapy: none     He had atrial fibrillation.  Pt has maintained sinus rhythm after surgery.  He was not restarted on anticoagulation after left appendage ligation 9/2022.     He has a seizure disorder.  He has not had a seizure for years and has been maintained on phenobarbital without noted side effects.     He has vitamin D deficiency.  Patient states that he is compliant with his vitamin D supplementation.     He unfortunately continues to smoke despite my advice and encouragement.      Pt has carotid artery stenosis.  Patient underwent right CEA 1/27/2021 with Dr. Taylor.     Pt has COPD.  Pt is currently treated with Advair.  He feels his breathing is at baseline and he denies any exacerbations since his  last visit.       OARRS:  Miguel Garcia, DO on 4/2/2024 10:18 AM  I have personally reviewed the OARRS report for Gold Summers. I have considered the risks of abuse, dependence, addiction and diversion and I believe that it is clinically appropriate for Gold Summers to be prescribed this medication    Is the patient prescribed a combination of a benzodiazepine and opioid?  No    Last Urine Drug Screen / ordered today: No  Recent Results (from the past 8760 hour(s))   OPIATE/OPIOID/BENZO PRESCRIPTION COMPLIANCE    Collection Time: 06/29/23 10:53 AM   Result Value Ref Range    DRUG SCREEN COMMENT URINE SEE BELOW     Creatine, Urine 42.2 mg/dL    Amphetamine Screen, Urine PRESUMPTIVE NEGATIVE NEGATIVE    Barbiturate Screen, Urine PRESUMPTIVE POSITIVE (A) NEGATIVE    Cannabinoid Screen, Urine PRESUMPTIVE NEGATIVE NEGATIVE    Cocaine Screen, Urine PRESUMPTIVE NEGATIVE NEGATIVE    PCP Screen, Urine PRESUMPTIVE NEGATIVE NEGATIVE    7-Aminoclonazepam <25 Cutoff <25 ng/mL    Alpha-Hydroxyalprazolam <25 Cutoff <25 ng/mL    Alpha-Hydroxymidazolam <25 Cutoff <25 ng/mL    Alprazolam <25 Cutoff <25 ng/mL    Chlordiazepoxide <25 Cutoff <25 ng/mL    Clonazepam <25 Cutoff <25 ng/mL    Diazepam <25 Cutoff <25 ng/mL    Lorazepam <25 Cutoff <25 ng/mL    Midazolam <25 Cutoff <25 ng/mL    Nordiazepam <25 Cutoff <25 ng/mL    Oxazepam <25 Cutoff <25 ng/mL    Temazepam <25 Cutoff <25 ng/mL    Zolpidem <25 Cutoff <25 ng/mL    Zolpidem Metabolite (ZCA) <25 Cutoff <25 ng/mL    6-Acetylmorphine <25 Cutoff <25 ng/mL    Codeine <50 Cutoff <50 ng/mL    Hydrocodone <25 Cutoff <25 ng/mL    Hydromorphone <25 Cutoff <25 ng/mL    Morphine Urine <50 Cutoff <50 ng/mL    Norhydrocodone <25 Cutoff <25 ng/mL    Noroxycodone <25 Cutoff <25 ng/mL    Oxycodone <25 Cutoff <25 ng/mL    Oxymorphone <25 Cutoff <25 ng/mL    Tramadol <50 Cutoff <50 ng/mL    O-Desmethyltramadol <50 Cutoff <50 ng/mL    Fentanyl <2.5 Cutoff<2.5 ng/mL    Norfentanyl <2.5 Cutoff<2.5  "ng/mL    METHADONE CONFIRMATION,URINE <25 Cutoff <25 ng/mL    EDDP <25 Cutoff <25 ng/mL     Results are as expected.         Controlled Substance Agreement:  Date of the Last Agreement: 6/29/2023  Reviewed Controlled Substance Agreement including but not limited to the benefits, risks, and alternatives to treatment with a Controlled Substance medication(s).    Anticonvulsant:   What is the patient's goal of therapy? Prevent seizures  Is this being achieved with current treatment? yes    Activities of Daily Living:   Is your overall impression that this patient is benefiting (symptom reduction outweighs side effects) from Anticonvulsant therapy? Yes     1. Physical Functioning: Same  2. Family Relationship: Same  3. Social Relationship: Same  4. Mood: Same  5. Sleep Patterns: Same  6. Overall Function: Same      Review of Systems  Constitutional: Patient denies any fever, chills, loss of appetite, or unexplained weight loss.  Cardiovascular: Patient denies any chest pain, shortness of breath with exertion, tachycardia, palpitations, orthopnea, or paroxysmal nocturnal dyspnea.  Respiratory: Patient denies any cough, shortness breath, or wheezing.  Gastrointestinal: Patient denies any nausea, vomiting, diarrhea, constipation, melena, hematochezia, or reflux symptoms.  Skin: Denies any rashes or skin lesions.   Neurology: Patient denies any new motor or sensory losses.  Denies any numbness, tingling, weakness, and incoordination of the extremities.  Patient also denies any tremor, seizures, or gait instability.  Endocrinology: Denies any polyuria, polydipsia, polyphagia, or heat/cold intolerance.      Objective   /59   Pulse 58   Temp 36.3 °C (97.4 °F)   Ht 1.651 m (5' 5\")   Wt 59.5 kg (131 lb 3.2 oz)   SpO2 98%   BMI 21.83 kg/m²     Physical Exam  General Appearance: Alert and cooperative, in no acute distress, well-developed/well-nourished.  Neck: Supple and without adenopathy or rigidity.  There is no JVD " at 90° and no carotid bruits are noted.  There is no thyromegaly, thyroid tenderness, or palpable thyroid nodules.    Heart: Regular rate and rhythm with 1/6 murmur at RSB and not noted ectopy.    Respiratory: Lungs are clear to auscultation bilaterally with good air exchange.  Good respiratory effort and no accessory muscle use.  Skin: Good turgor, moist, warm and without rashes or lesions.  Neurological exam: Alert and oriented ×3, no tremor, normal gait.  Extremities: No clubbing, cyanosis, or edema      Assessment/Plan       HTN:  Blood pressure appears adequately controlled and we will continue with the current antihypertensive therapy.     Hyperlipidemia: Patient will continue with the current medication.  Dietary changes, exercise, and maintenance of healthy weight were discussed at length.     CAD: Patient underwent CABG x2 and left atrial appendage ligation during the 8/15/2022 CABG as his Coumadin was discontinued on 7/1/2022 as a result of a GI bleed.  Patient was started on clopidogrel per cardiology and has continued to tolerate it well.     Paroxysmal A. fib: Pt underwent left atrial appendage ligation after his anticoagulation was discontinued 7/2022 for a GI bleed.   Pt has maintained sinus rhythm.W  e will continue the current medication and will continue to follow with cardiology as well.     Iron Deficiency Anemia: He was referred to hematology and was seen 12/9/2022.  PT WAS PREVIOUSLY TREATED WITH IRON INFUSIONS AND HAS REMAINED ON ORAL THERAPY NOW.  We will continue current supplementation and pt will continue to follow with hematology.      COPD: Stable based on symptoms.  Smoking cessation encouraged.  Pt will continue the current medication.     BPH: Stable based on symptoms.  He is following with urology as well.     Seizure disorder: Stable based on symptoms.  Patient is to continue on the phenobarbital.  We will continue to monitor.     Vit B12 deficiency: Stable on recent labs.  Pt to  continue monthly B12 injections.     Vit D deficiency: Pt will continue the current vitamin D supplementation.     Pulm HTN: Stable based on symptoms.  Continue to follow with cardiology.     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  Continue to follow with ophthalmology.           PSA due 11/18/2023  COLONOSCOPY DUE 7/2024  MCAW due 12/2024        Orders Placed This Encounter   Procedures    Disability Peri     Requested Prescriptions     Signed Prescriptions Disp Refills    metoprolol succinate XL (Toprol-XL) 50 mg 24 hr tablet 90 tablet 0     Sig: Take 1 tablet (50 mg) by mouth once daily.    cyanocobalamin (Vitamin B-12) 1,000 mcg tablet       Sig: Take 1 tablet (1,000 mcg) by mouth once daily.

## 2024-04-02 NOTE — PATIENT INSTRUCTIONS
Follow up in 3 months.      We are expecting to start seeing patients at our new location on 5/1/2024.  Our new address will be:  [address] [place] [id number]     It was a pleasure to see you today. Thank you for choosing us for your health care needs.    If you have lab or other testing completed and have not been informed of results within one week, please call the office for your results.    If you haven't done so, consider signing up for [facility] [employer], the [facility] personal health record. Ask the staff how you can get started.

## 2024-06-07 DIAGNOSIS — I25.10 ARTERIOSCLEROSIS OF CORONARY ARTERY: ICD-10-CM

## 2024-06-10 RX ORDER — CLOPIDOGREL BISULFATE 75 MG/1
75 TABLET ORAL DAILY
Qty: 90 TABLET | Refills: 0 | Status: SHIPPED | OUTPATIENT
Start: 2024-06-10

## 2024-06-11 DIAGNOSIS — I10 PRIMARY HYPERTENSION: ICD-10-CM

## 2024-06-11 DIAGNOSIS — K92.2 GASTROINTESTINAL HEMORRHAGE, UNSPECIFIED GASTROINTESTINAL HEMORRHAGE TYPE: ICD-10-CM

## 2024-06-11 DIAGNOSIS — I25.10 ARTERIOSCLEROSIS OF CORONARY ARTERY: ICD-10-CM

## 2024-06-12 DIAGNOSIS — I10 PRIMARY HYPERTENSION: ICD-10-CM

## 2024-06-12 RX ORDER — CLOPIDOGREL BISULFATE 75 MG/1
75 TABLET ORAL DAILY
Qty: 90 TABLET | Refills: 0 | OUTPATIENT
Start: 2024-06-12

## 2024-06-12 RX ORDER — OLMESARTAN MEDOXOMIL 20 MG/1
20 TABLET ORAL DAILY
Qty: 90 TABLET | Refills: 0 | Status: SHIPPED | OUTPATIENT
Start: 2024-06-12 | End: 2024-06-12 | Stop reason: SDUPTHER

## 2024-06-12 RX ORDER — OLMESARTAN MEDOXOMIL 20 MG/1
20 TABLET ORAL DAILY
Qty: 100 TABLET | Refills: 0 | Status: SHIPPED | OUTPATIENT
Start: 2024-06-12

## 2024-06-18 DIAGNOSIS — I10 PRIMARY HYPERTENSION: ICD-10-CM

## 2024-06-19 RX ORDER — OLMESARTAN MEDOXOMIL 20 MG/1
20 TABLET ORAL DAILY
Qty: 100 TABLET | Refills: 0 | OUTPATIENT
Start: 2024-06-19

## 2024-06-19 NOTE — TELEPHONE ENCOUNTER
Pharmacy stated they have received it, but he filled the one at [facility].

## 2024-06-27 DIAGNOSIS — G40.909 SEIZURE DISORDER (MULTI): ICD-10-CM

## 2024-06-28 DIAGNOSIS — K92.2 GASTROINTESTINAL HEMORRHAGE, UNSPECIFIED GASTROINTESTINAL HEMORRHAGE TYPE: ICD-10-CM

## 2024-07-01 RX ORDER — PHENOBARBITAL 64.8 MG/1
64.8 TABLET ORAL 2 TIMES DAILY
Qty: 180 TABLET | Refills: 0 | Status: SHIPPED | OUTPATIENT
Start: 2024-07-01

## 2024-07-01 RX ORDER — PANTOPRAZOLE SODIUM 40 MG/1
40 TABLET, DELAYED RELEASE ORAL
Qty: 90 TABLET | Refills: 0 | OUTPATIENT
Start: 2024-07-01

## 2024-07-02 ENCOUNTER — APPOINTMENT (OUTPATIENT)
Dept: PRIMARY CARE | Facility: CLINIC | Age: 69
End: 2024-07-02
Payer: MEDICARE

## 2024-07-02 VITALS
WEIGHT: 135.9 LBS | BODY MASS INDEX: 22.64 KG/M2 | SYSTOLIC BLOOD PRESSURE: 138 MMHG | OXYGEN SATURATION: 98 % | HEART RATE: 53 BPM | TEMPERATURE: 96.3 F | DIASTOLIC BLOOD PRESSURE: 60 MMHG | HEIGHT: 65 IN

## 2024-07-02 DIAGNOSIS — I25.10 CORONARY ARTERY DISEASE INVOLVING NATIVE CORONARY ARTERY OF NATIVE HEART WITHOUT ANGINA PECTORIS: ICD-10-CM

## 2024-07-02 DIAGNOSIS — G40.909 SEIZURE DISORDER (MULTI): ICD-10-CM

## 2024-07-02 DIAGNOSIS — N40.1 BPH WITH OBSTRUCTION/LOWER URINARY TRACT SYMPTOMS: ICD-10-CM

## 2024-07-02 DIAGNOSIS — H35.3221 EXUDATIVE AGE-RELATED MACULAR DEGENERATION, LEFT EYE, WITH ACTIVE CHOROIDAL NEOVASCULARIZATION (MULTI): ICD-10-CM

## 2024-07-02 DIAGNOSIS — E55.9 VITAMIN D DEFICIENCY: ICD-10-CM

## 2024-07-02 DIAGNOSIS — R22.0 JAW SWELLING: ICD-10-CM

## 2024-07-02 DIAGNOSIS — Z79.899 HIGH RISK MEDICATION USE: ICD-10-CM

## 2024-07-02 DIAGNOSIS — I48.0 PAF (PAROXYSMAL ATRIAL FIBRILLATION) (MULTI): ICD-10-CM

## 2024-07-02 DIAGNOSIS — Z11.59 NEED FOR HEPATITIS C SCREENING TEST: ICD-10-CM

## 2024-07-02 DIAGNOSIS — J44.9 CHRONIC OBSTRUCTIVE PULMONARY DISEASE, UNSPECIFIED COPD TYPE (MULTI): ICD-10-CM

## 2024-07-02 DIAGNOSIS — I27.20 PULMONARY HYPERTENSION (MULTI): ICD-10-CM

## 2024-07-02 DIAGNOSIS — K92.2 GASTROINTESTINAL HEMORRHAGE, UNSPECIFIED GASTROINTESTINAL HEMORRHAGE TYPE: ICD-10-CM

## 2024-07-02 DIAGNOSIS — D50.9 IRON DEFICIENCY ANEMIA, UNSPECIFIED IRON DEFICIENCY ANEMIA TYPE: ICD-10-CM

## 2024-07-02 DIAGNOSIS — E78.2 MIXED HYPERLIPIDEMIA: ICD-10-CM

## 2024-07-02 DIAGNOSIS — E53.8 VITAMIN B 12 DEFICIENCY: ICD-10-CM

## 2024-07-02 DIAGNOSIS — N13.8 BPH WITH OBSTRUCTION/LOWER URINARY TRACT SYMPTOMS: ICD-10-CM

## 2024-07-02 DIAGNOSIS — I10 PRIMARY HYPERTENSION: Primary | ICD-10-CM

## 2024-07-02 DIAGNOSIS — Z87.19 HISTORY OF GI BLEED: ICD-10-CM

## 2024-07-02 PROCEDURE — 3075F SYST BP GE 130 - 139MM HG: CPT | Performed by: FAMILY MEDICINE

## 2024-07-02 PROCEDURE — 3008F BODY MASS INDEX DOCD: CPT | Performed by: FAMILY MEDICINE

## 2024-07-02 PROCEDURE — 3078F DIAST BP <80 MM HG: CPT | Performed by: FAMILY MEDICINE

## 2024-07-02 PROCEDURE — 80358 DRUG SCREENING METHADONE: CPT

## 2024-07-02 PROCEDURE — 80354 DRUG SCREENING FENTANYL: CPT

## 2024-07-02 PROCEDURE — 82570 ASSAY OF URINE CREATININE: CPT

## 2024-07-02 PROCEDURE — 80361 OPIATES 1 OR MORE: CPT

## 2024-07-02 PROCEDURE — 99214 OFFICE O/P EST MOD 30 MIN: CPT | Performed by: FAMILY MEDICINE

## 2024-07-02 PROCEDURE — 80346 BENZODIAZEPINES1-12: CPT

## 2024-07-02 PROCEDURE — 1159F MED LIST DOCD IN RCRD: CPT | Performed by: FAMILY MEDICINE

## 2024-07-02 PROCEDURE — 1160F RVW MEDS BY RX/DR IN RCRD: CPT | Performed by: FAMILY MEDICINE

## 2024-07-02 PROCEDURE — 80365 DRUG SCREENING OXYCODONE: CPT

## 2024-07-02 PROCEDURE — 80373 DRUG SCREENING TRAMADOL: CPT

## 2024-07-02 PROCEDURE — 80368 SEDATIVE HYPNOTICS: CPT

## 2024-07-02 PROCEDURE — 80307 DRUG TEST PRSMV CHEM ANLYZR: CPT

## 2024-07-02 RX ORDER — PANTOPRAZOLE SODIUM 40 MG/1
40 TABLET, DELAYED RELEASE ORAL
Qty: 90 TABLET | Refills: 0 | Status: SHIPPED | OUTPATIENT
Start: 2024-07-02

## 2024-07-02 ASSESSMENT — PATIENT HEALTH QUESTIONNAIRE - PHQ9
2. FEELING DOWN, DEPRESSED OR HOPELESS: NOT AT ALL
SUM OF ALL RESPONSES TO PHQ9 QUESTIONS 1 AND 2: 0
1. LITTLE INTEREST OR PLEASURE IN DOING THINGS: NOT AT ALL

## 2024-07-02 NOTE — PATIENT INSTRUCTIONS
Follow up in 3 months with labs to be done PRIOR.    It was a pleasure to see you today. Thank you for choosing us for your health care needs.    If you have lab or other testing completed and have not been informed of results within one week, please call the office for your results.    If you haven't done so, consider signing up for [facility] [employer], the [facility] personal health record. Ask the staff how you can get started.

## 2024-07-02 NOTE — PROGRESS NOTES
Subjective   Patient ID: Gold Summers is a 69 y.o. male who presents for Follow-up.    HPI     Patient c/o bilateral jaw swelling that comes and goes.  Feels as if he is drooling a lot for about 2 months.  Denies any associated pain.   The swelling is noted by family and not by the patient himself.  Has not noted any swelling in the neck.  Does not seem to be associated with meals.  Denies any fever, chills, or night sweats.     Colonoscopy: 2021          Patient has hypertension.  He does monitor readings at home. BP typically 130s/70-80s.  He denies CP, SOB at this time.  He is taking Metoprolol.      He has hyperlipidemia.  He tries to limit the amount of fatty foods and high cholesterol foods that he consumes in his diet.  Patient states that he has been compliant with the dosing of his statin therapy.     Pt has CAD.  Presently denies any chest pain shortness of breath.  9/16/2022: Underwent off-pump coronary bypass with R bilateral pulmonary vein isolation LENCHO to LAD, and LIMA to OM, left atrial appendage ligation with a 45 mm clip by Dr. Sawyer Ibrahim      He had atrial fibrillation.  Pt has maintained sinus rhythm after surgery.  He was not restarted on anticoagulation after left appendage ligation 9/2022.  Antiarrhythmic therapy: Metoprolol succinate 50 mg daily  Antiplatelet therapy: Aspirin 81 mg daily, clopidogrel 75 mg daily  Anticoagulation therapy: none     He has a seizure disorder.  He has not had a seizure for years and has been maintained on phenobarbital without noted side effects.     He has vitamin D deficiency.  Patient states that he is compliant with his vitamin D supplementation.     He unfortunately continues to smoke despite my advice and encouragement.      Pt has carotid artery stenosis.  Patient underwent right CEA 1/27/2021 with Dr. Taylor.     Pt has COPD.  Pt was previously treated with Advair but he discontinued it when he did not note any improvement in his breathing.  He feels  his breathing is at baseline and he denies any exacerbations since his last visit.    Pt has anemia.   He was previously referred to GI and hematology for poor iron absorption  Was treated with IV iron in the past.  Currently on oral iron.      OARRS:  Miguel Garcia,  on 7/2/2024 10:12 AM  I have personally reviewed the OARRS report for Gold Summers. I have considered the risks of abuse, dependence, addiction and diversion and I believe that it is clinically appropriate for Gold Summers to be prescribed this medication    Is the patient prescribed a combination of a benzodiazepine and opioid?  No    Last Urine Drug Screen / ordered today: Yes  N/A    Clinical rationale for not completing a Urine Drug Screen: Drug screen was delayed by a few days so pt could have it done at the time of his appt and minimize travel.      Controlled Substance Agreement:  Date of the Last Agreement: 7/2/2024  Reviewed Controlled Substance Agreement including but not limited to the benefits, risks, and alternatives to treatment with a Controlled Substance medication(s).    Anticonvulsant:   What is the patient's goal of therapy? Prevent seizures  Is this being achieved with current treatment? yes    Activities of Daily Living:   Is your overall impression that this patient is benefiting (symptom reduction outweighs side effects) from Anticonvulsant therapy? Yes     1. Physical Functioning: Same  2. Family Relationship: Same  3. Social Relationship: Same  4. Mood: Same  5. Sleep Patterns: Same  6. Overall Function: Same        Review of Systems  Constitutional: Patient denies any fever, chills, loss of appetite, or unexplained weight loss.  Cardiovascular: Patient denies any chest pain, shortness of breath with exertion, tachycardia, palpitations, orthopnea, or paroxysmal nocturnal dyspnea.  Respiratory: Patient denies any cough, shortness breath, or wheezing.  Gastrointestinal: Patient denies any nausea, vomiting, diarrhea,  "constipation, melena, hematochezia, or reflux symptoms  Skin: Denies any rashes or skin lesions  Neurology: Patient denies any new motor or sensory losses. Denies any numbness, tingling, weakness, and incoordination of the extremities. Patient also denies any tremor, seizures, or gait instability.  Endocrinology: Denies any polyuria, polydipsia, polyphagia, or heat/cold intolerance.    Head: Swollen jaw as detailed in the HPI    Objective   /60   Pulse 53   Temp 35.7 °C (96.3 °F)   Ht 1.651 m (5' 5\")   Wt 61.6 kg (135 lb 14.4 oz)   SpO2 98%   BMI 22.61 kg/m²     Physical Exam  General Appearance: Alert and cooperative, in no acute distress, well-developed/well-nourished.  Neck: Supple and without adenopathy or rigidity. There is no JVD at 90° and no carotid bruits are noted. There is no thyromegaly, thyroid tenderness, or palpable thyroid nodules.  Heart: Regular rate and rhythm without murmur or ectopy.  Lungs: Clear to auscultation bilaterally with good air exchange.  Skin: Good turgor, moist, warm and without rashes or lesions.  Neurological exam: Alert and oriented ×3, no tremor, normal gait.  Extremities: No clubbing, cyanosis, or edema    Head: Examination reveals no salivary gland enlargement or tenderness. Appears to be some laxity of the skin at the corners of the mouth. No oral lesions noted.  No cervical adenopathy.    Assessment/Plan     HTN:  Blood pressure appears adequately controlled and we will continue with the current antihypertensive therapy.     Hyperlipidemia: Patient will continue with the current medication.  Dietary changes, exercise, and maintenance of healthy weight were discussed at length.     CAD: Patient underwent CABG x2 and left atrial appendage ligation during the 8/15/2022 CABG as his Coumadin (for atrial fib) was discontinued on 7/1/2022 as a result of a GI bleed.  Patient was started on clopidogrel per cardiology and has continued to tolerate it well.     Paroxysmal " A. fib: Pt underwent left atrial appendage ligation after his anticoagulation was discontinued 7/2022 for a GI bleed.   Pt has maintained sinus rhythm.  He will continue the current medication and will continue to follow with cardiology as well.     Iron Deficiency Anemia: He was referred to hematology in past.  PT WAS PREVIOUSLY TREATED WITH IRON INFUSIONS AND HAS REMAINED ON ORAL THERAPY NOW.  We will continue current supplementation and pt will continue to follow with hematology.      COPD: Stable based on symptoms.  Smoking cessation encouraged.  Not currently on medication as he stopped Advair in the past.     BPH: Stable based on symptoms.  He is following with urology as well.     Seizure disorder: Stable based on symptoms.  Patient is to continue on the phenobarbital.  We will continue to monitor.     Vit B12 deficiency: Stable on recent labs.  Pt to continue monthly B12 injections.     Vit D deficiency: Pt will continue the current vitamin D supplementation.     Pulm HTN: Stable based on symptoms.  Continue to follow with cardiology.     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  Continue to follow with ophthalmology.     Jaw swelling:  Will refer him to see an ENT for further evaluation.    Exam was unremarkable today.     PSA due 11/18/2023  COLONOSCOPY DUE 7/2024  MCAW due 12/2024    Follow up in 3 months.       Scribe Attestation  By signing my name below, [employer] Scribe   attest that this documentation has been prepared under the direction and in the presence of Miguel Garcia DO.    Orders Placed This Encounter   Procedures    Opiate/Opioid/Benzo Prescription Compliance    Basic Metabolic Panel    Vitamin B12    Vitamin D 25-Hydroxy,Total (for eval of Vitamin D levels)    Screen Opiate/Opioid/Benzo Prescription Compliance    Confirmation Opiate/Opioid/Benzo Prescription Compliance    Referral to ENT     Requested Prescriptions     Signed Prescriptions Disp Refills     pantoprazole (ProtoNix) 40 mg EC tablet 90 tablet 0     Sig: Take 1 tablet (40 mg) by mouth once daily in the morning. Take before meals.

## 2024-07-03 LAB
AMPHETAMINES UR QL SCN: NORMAL
BARBITURATES UR QL SCN: NORMAL
BZE UR QL SCN: NORMAL
CANNABINOIDS UR QL SCN: NORMAL
CREAT UR-MCNC: 212.1 MG/DL (ref 20–370)
PCP UR QL SCN: NORMAL

## 2024-07-29 PROBLEM — Z87.19 HISTORY OF GI BLEED: Status: ACTIVE | Noted: 2023-03-23

## 2024-07-30 ENCOUNTER — TELEPHONE (OUTPATIENT)
Dept: CARDIAC SURGERY | Facility: CLINIC | Age: 69
End: 2024-07-30
Payer: MEDICARE

## 2024-07-30 NOTE — TELEPHONE ENCOUNTER
Phone call made to patient regarding the holter monitor Dr Barron had indicated at their last visit that  he would like him to wear for 7 days in the month of August and results would be reviewed at the virtual visit in September.  I explained that I needed to verify his address and insurance to be able to order the monitor to be mailed to his house.  Patient stated that the address and insurance were correct.  Office number left with patient should he have any follow up questions prior to visit.

## 2024-08-06 ENCOUNTER — ANCILLARY PROCEDURE (OUTPATIENT)
Dept: CARDIAC SURGERY | Facility: CLINIC | Age: 69
End: 2024-08-06
Payer: MEDICARE

## 2024-08-06 DIAGNOSIS — I48.0 PAROXYSMAL A-FIB (MULTI): ICD-10-CM

## 2024-08-06 PROCEDURE — 93244 EXT ECG>48HR<7D REV&INTERPJ: CPT | Performed by: INTERNAL MEDICINE

## 2024-08-10 ENCOUNTER — HOSPITAL ENCOUNTER (OUTPATIENT)
Dept: RADIOLOGY | Facility: EXTERNAL LOCATION | Age: 69
Discharge: HOME | End: 2024-08-10
Payer: MEDICARE

## 2024-08-10 DIAGNOSIS — S99.922A INJURY OF FOOT, LEFT, INITIAL ENCOUNTER: ICD-10-CM

## 2024-09-03 ENCOUNTER — TELEMEDICINE (OUTPATIENT)
Dept: CARDIAC SURGERY | Facility: CLINIC | Age: 69
End: 2024-09-03
Payer: MEDICARE

## 2024-09-03 DIAGNOSIS — I48.0 PAF (PAROXYSMAL ATRIAL FIBRILLATION) (MULTI): Primary | ICD-10-CM

## 2024-09-03 PROCEDURE — 1160F RVW MEDS BY RX/DR IN RCRD: CPT

## 2024-09-03 PROCEDURE — 99443 PR PHYS/QHP TELEPHONE EVALUATION 21-30 MIN: CPT

## 2024-09-03 PROCEDURE — 1159F MED LIST DOCD IN RCRD: CPT

## 2024-09-03 SDOH — ECONOMIC STABILITY: FOOD INSECURITY: WITHIN THE PAST 12 MONTHS, YOU WORRIED THAT YOUR FOOD WOULD RUN OUT BEFORE YOU GOT MONEY TO BUY MORE.: NEVER TRUE

## 2024-09-03 SDOH — ECONOMIC STABILITY: FOOD INSECURITY: WITHIN THE PAST 12 MONTHS, THE FOOD YOU BOUGHT JUST DIDN'T LAST AND YOU DIDN'T HAVE MONEY TO GET MORE.: NEVER TRUE

## 2024-09-03 ASSESSMENT — COLUMBIA-SUICIDE SEVERITY RATING SCALE - C-SSRS
6. HAVE YOU EVER DONE ANYTHING, STARTED TO DO ANYTHING, OR PREPARED TO DO ANYTHING TO END YOUR LIFE?: NO
2. HAVE YOU ACTUALLY HAD ANY THOUGHTS OF KILLING YOURSELF?: NO
1. IN THE PAST MONTH, HAVE YOU WISHED YOU WERE DEAD OR WISHED YOU COULD GO TO SLEEP AND NOT WAKE UP?: NO

## 2024-09-03 ASSESSMENT — ENCOUNTER SYMPTOMS
DEPRESSION: 0
OCCASIONAL FEELINGS OF UNSTEADINESS: 0
LOSS OF SENSATION IN FEET: 0

## 2024-09-03 ASSESSMENT — LIFESTYLE VARIABLES
HOW MANY STANDARD DRINKS CONTAINING ALCOHOL DO YOU HAVE ON A TYPICAL DAY: PATIENT DOES NOT DRINK
AUDIT-C TOTAL SCORE: 0
HOW OFTEN DO YOU HAVE SIX OR MORE DRINKS ON ONE OCCASION: NEVER
HOW OFTEN DO YOU HAVE A DRINK CONTAINING ALCOHOL: NEVER
SKIP TO QUESTIONS 9-10: 1

## 2024-09-03 NOTE — PROGRESS NOTES
Chief Complaint  Surveillance visit    A telephone visit (audio only) between the patient (at the originating site) and the provider (at the distant site) was utilized to provide this telehealth service. ~ Verbal consent was requested and obtained from Gold Summers on this date, 09/03/2024 2:30 PM , for a telehealth visit.      HPI:  Mr. Gold Summers is a 69 y.o. male, who presents accompanied by his significant other for evaluation after surgical ablation. He is now 2 years out from his operation, and has recovered nicely.  He continues to do his normal activities and states that appetite is good.  Denies chest pain, shortness of breath,  palpitations, dizziness, or syncope.  He does report that he can occasionally feel the retained wires, but they are not bothering him currently.    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    Peyronie disease    Long term (current) use of anticoagulants 09/22/2022    Anticoagulant long-term use    Other long term (current) drug therapy 11/08/2022    High risk medication use    Personal history of other diseases of the circulatory system     History of atrial fibrillation    Personal history of other diseases of the digestive system 12/22/2022    History of gastrointestinal hemorrhage    Personal history of other endocrine, nutritional and metabolic disease     History of hypercholesterolemia    Proximal humerus fracture 02/04/2023    Tobacco use 11/08/2022    Tobacco abuse    Unspecified convulsions (Multi) 12/22/2022    Seizures       Past Surgical History:   Procedure Laterality Date    COLONOSCOPY  09/23/2015    Complete Colonoscopy    CT ANGIO NECK  12/7/2020    CT NECK ANGIO W AND WO IV CONTRAST 12/7/2020 [facility]    OTHER SURGICAL HISTORY  01/06/2020    Penile surgery    OTHER SURGICAL HISTORY  01/06/2020    Renal lithotripsy    OTHER SURGICAL HISTORY   03/22/2021    Cardiac catheterization    OTHER SURGICAL HISTORY  02/04/2021    Endarterectomy    OTHER SURGICAL HISTORY  11/03/2021    Carotid artery angioplasty    OTHER SURGICAL HISTORY  11/03/2021    Hip surgery       Family History   Problem Relation Name Age of Onset    Diabetes Mother      Heart attack Father      Heart attack Sibling      Coronary artery disease Other      Heart attack Other         Social History     Socioeconomic History    Marital status: Single     Spouse name: Not on file    Number of children: Not on file    Years of education: Not on file    Highest education level: Not on file   Occupational History    Not on file   Tobacco Use    Smoking status: Every Day     Current packs/day: 1.00     Average packs/day: 1 pack/day for 20.0 years (20.0 ttl pk-yrs)     Types: Cigarettes    Smokeless tobacco: Never   Substance and Sexual Activity    Alcohol use: Not Currently    Drug use: Never    Sexual activity: Not on file   Other Topics Concern    Not on file   Social History Narrative    Not on file     Social Determinants of Health     Financial Resource Strain: Not on file   Food Insecurity: No Food Insecurity (9/3/2024)    Hunger Vital Sign     Worried About Running Out of Food in the Last Year: Never true     Ran Out of Food in the Last Year: Never true   Transportation Needs: Not on file   Physical Activity: Not on file   Stress: Not on file   Social Connections: Not on file   Intimate Partner Violence: Not on file   Housing Stability: Not on file       Allergies   Allergen Reactions    Other Unknown    Penicillins Other     Myalgia, vomiting       Physical Exam  Psychiatric:         Attention and Perception: Attention normal.         Mood and Affect: Mood normal.         Speech: Speech normal.         Behavior: Behavior normal.         Thought Content: Thought content normal.         Judgment: Judgment normal.       Results/Data:    Rhythm monitor dated August 7, through August 14, 2024. I  personally seen and evaluated this study. There is 0% burden of atrial fibrillation and 0% burden of atrial tachycardia. There was less than 1% burden of ventricular ectopy.     Assessment and Plan:   Paroxsymal atrial fibrillation(2 year visit)    S/P off-pump coronary bypass with R bilateral pulmonary vein isolation DOUG to LAD, and LIMA to OM,, left atrial appendage ligation with a 45 mm clip on September 16, 2022 by my partner Dr. Sawyer Ibrahim     Surgical ablation lines created:  1. Right antral pulmonary vein isolation  2. Left antral pulmonary vein isolation  3. Left atrial appendage ligation     [id number] score: 3  HASBLED score: 2     Antiarrhythmic therapy: Metoprolol succinate 50 mg daily  Antiplatelet therapy: Aspirin 81 mg daily, clopidogrel 75 mg daily  Anticoagulation therapy: none     Plan:  I am evaluating Mr. Summers in our Surgical Arrhythmia Clinic. The purpose of this clinic is to longitudinally follow our surgical patients and promote collaborative multidisciplinary care. I am happy to report that they remain in sinus rhythm. His most recent 7-day rhythm monitor shows 0% burden of atrial fibrillation, or atrial tachycardia. I have no changes to recommend for his medical regimen at this time.     He will remain on his metoprolol, for his coronary artery disease and hypertension. He will continue his clopidogrel for cerebrovascular disease.  I have instructed him to call the office if he has any issues with the wires poking him.  Patient verbalized understanding and will give us a call if the wires start to bother him. We will plan on a 7-day rhythm monitor in August, and another virtual visit in September to go over these results. This will be his 2 year visit.      Time: 35 minutes

## 2024-09-03 NOTE — LETTER
September 6, 2024     Jamal Nguyen MD  [address]  [place] [address]    Patient: Gold Summers   YOB: 1955   Date of Visit: 9/3/2024       Dear Dr. Jamal Nguyen MD:    Thank you for referring Gold Summers to me for evaluation. Below are my notes for this consultation.  If you have questions, please do not hesitate to call me. I look forward to following your patient along with you.       Sincerely,     Lisbeth Hampton, APRN-CNP      CC: Miguel Garcia, DO  ______________________________________________________________________________________    Chief Complaint  Surveillance visit    A telephone visit (audio only) between the patient (at the originating site) and the provider (at the distant site) was utilized to provide this telehealth service. ~ Verbal consent was requested and obtained from Gold Summers on this date, 09/03/2024 2:30 PM , for a telehealth visit.      HPI:  Mr. Gold Summers is a 69 y.o. male, who presents accompanied by his significant other for evaluation after surgical ablation. He is now 2 years out from his operation, and has recovered nicely.  He continues to do his normal activities and states that appetite is good.  Denies chest pain, shortness of breath,  palpitations, dizziness, or syncope.  He does report that he can occasionally feel the retained wires, but they are not bothering him currently.    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    Peyronie disease   • Long term (current) use of anticoagulants 09/22/2022    Anticoagulant long-term use   • Other long term (current) drug therapy 11/08/2022    High risk medication use   • Personal history of other diseases of the circulatory system     History of atrial fibrillation   • Personal history of other diseases of the digestive system 12/22/2022    History of gastrointestinal hemorrhage   • Personal  history of other endocrine, nutritional and metabolic disease     History of hypercholesterolemia   • Proximal humerus fracture 02/04/2023   • Tobacco use 11/08/2022    Tobacco abuse   • Unspecified convulsions (Multi) 12/22/2022    Seizures       Past Surgical History:   Procedure Laterality Date   • COLONOSCOPY  09/23/2015    Complete Colonoscopy   • CT ANGIO NECK  12/7/2020    CT NECK ANGIO W AND WO IV CONTRAST 12/7/2020 [facility]   • OTHER SURGICAL HISTORY  01/06/2020    Penile surgery   • OTHER SURGICAL HISTORY  01/06/2020    Renal lithotripsy   • OTHER SURGICAL HISTORY  03/22/2021    Cardiac catheterization   • OTHER SURGICAL HISTORY  02/04/2021    Endarterectomy   • OTHER SURGICAL HISTORY  11/03/2021    Carotid artery angioplasty   • OTHER SURGICAL HISTORY  11/03/2021    Hip surgery       Family History   Problem Relation Name Age of Onset   • Diabetes Mother     • Heart attack Father     • Heart attack Sibling     • Coronary artery disease Other     • Heart attack Other         Social History     Socioeconomic History   • Marital status: Single     Spouse name: Not on file   • Number of children: Not on file   • Years of education: Not on file   • Highest education level: Not on file   Occupational History   • Not on file   Tobacco Use   • Smoking status: Every Day     Current packs/day: 1.00     Average packs/day: 1 pack/day for 20.0 years (20.0 ttl pk-yrs)     Types: Cigarettes   • Smokeless tobacco: Never   Substance and Sexual Activity   • Alcohol use: Not Currently   • Drug use: Never   • Sexual activity: Not on file   Other Topics Concern   • Not on file   Social History Narrative   • Not on file     Social Determinants of Health     Financial Resource Strain: Not on file   Food Insecurity: No Food Insecurity (9/3/2024)    Hunger Vital Sign    • Worried About Running Out of Food in the Last Year: Never true    • Ran Out of Food in the Last Year: Never true   Transportation Needs: Not on file    Physical Activity: Not on file   Stress: Not on file   Social Connections: Not on file   Intimate Partner Violence: Not on file   Housing Stability: Not on file       Allergies   Allergen Reactions   • Other Unknown   • Penicillins Other     Myalgia, vomiting       Physical Exam  Psychiatric:         Attention and Perception: Attention normal.         Mood and Affect: Mood normal.         Speech: Speech normal.         Behavior: Behavior normal.         Thought Content: Thought content normal.         Judgment: Judgment normal.       Results/Data:    Rhythm monitor dated August 7, through August 14, 2024. I personally seen and evaluated this study. There is 0% burden of atrial fibrillation and 0% burden of atrial tachycardia. There was less than 1% burden of ventricular ectopy.     Assessment and Plan:   Paroxsymal atrial fibrillation(2 year visit)    S/P off-pump coronary bypass with R bilateral pulmonary vein isolation DOUG to LAD, and LIMA to OM,, left atrial appendage ligation with a 45 mm clip on September 16, 2022 by my partner Dr. Sawyer Ibrahim     Surgical ablation lines created:  1. Right antral pulmonary vein isolation  2. Left antral pulmonary vein isolation  3. Left atrial appendage ligation     [id number] score: 3  HASBLED score: 2     Antiarrhythmic therapy: Metoprolol succinate 50 mg daily  Antiplatelet therapy: Aspirin 81 mg daily, clopidogrel 75 mg daily  Anticoagulation therapy: none     Plan:  I am evaluating Mr. Summers in our Surgical Arrhythmia Clinic. The purpose of this clinic is to longitudinally follow our surgical patients and promote collaborative multidisciplinary care. I am happy to report that they remain in sinus rhythm. His most recent 7-day rhythm monitor shows 0% burden of atrial fibrillation, or atrial tachycardia. I have no changes to recommend for his medical regimen at this time.     He will remain on his metoprolol, for his coronary artery disease and hypertension. He  will continue his clopidogrel for cerebrovascular disease.  I have instructed him to call the office if he has any issues with the wires poking him.  Patient verbalized understanding and will give us a call if the wires start to bother him. We will plan on a 7-day rhythm monitor in August, and another virtual visit in September to go over these results. This will be his 2 year visit.      Time: 40 minutes

## 2024-09-09 DIAGNOSIS — D50.9 IRON DEFICIENCY ANEMIA, UNSPECIFIED: ICD-10-CM

## 2024-09-09 DIAGNOSIS — I10 PRIMARY HYPERTENSION: ICD-10-CM

## 2024-09-09 DIAGNOSIS — I65.23 BILATERAL CAROTID ARTERY OCCLUSION: ICD-10-CM

## 2024-09-09 DIAGNOSIS — I25.10 ARTERIOSCLEROSIS OF CORONARY ARTERY: ICD-10-CM

## 2024-09-09 RX ORDER — ATORVASTATIN CALCIUM 80 MG/1
80 TABLET, FILM COATED ORAL NIGHTLY
Qty: 90 TABLET | Refills: 0 | Status: SHIPPED | OUTPATIENT
Start: 2024-09-09

## 2024-09-09 RX ORDER — CLOPIDOGREL BISULFATE 75 MG/1
75 TABLET ORAL DAILY
Qty: 90 TABLET | Refills: 0 | Status: SHIPPED | OUTPATIENT
Start: 2024-09-09

## 2024-09-09 RX ORDER — OLMESARTAN MEDOXOMIL 20 MG/1
20 TABLET ORAL DAILY
Qty: 100 TABLET | Refills: 0 | Status: SHIPPED | OUTPATIENT
Start: 2024-09-09

## 2024-09-09 RX ORDER — FERROUS SULFATE 325(65) MG
65 TABLET ORAL
Qty: 90 TABLET | Refills: 0 | Status: SHIPPED | OUTPATIENT
Start: 2024-09-09

## 2024-09-30 ENCOUNTER — LAB (OUTPATIENT)
Dept: LAB | Facility: LAB | Age: 69
End: 2024-09-30
Payer: MEDICARE

## 2024-09-30 DIAGNOSIS — Z11.59 NEED FOR HEPATITIS C SCREENING TEST: ICD-10-CM

## 2024-09-30 DIAGNOSIS — E53.8 VITAMIN B 12 DEFICIENCY: ICD-10-CM

## 2024-09-30 DIAGNOSIS — E55.9 VITAMIN D DEFICIENCY: ICD-10-CM

## 2024-09-30 DIAGNOSIS — I10 PRIMARY HYPERTENSION: ICD-10-CM

## 2024-09-30 LAB
25(OH)D3 SERPL-MCNC: 41 NG/ML (ref 30–100)
ANION GAP SERPL CALC-SCNC: 10 MMOL/L (ref 10–20)
BUN SERPL-MCNC: 17 MG/DL (ref 6–23)
CALCIUM SERPL-MCNC: 9.3 MG/DL (ref 8.6–10.3)
CHLORIDE SERPL-SCNC: 97 MMOL/L (ref 98–107)
CO2 SERPL-SCNC: 28 MMOL/L (ref 21–32)
CREAT SERPL-MCNC: 1.25 MG/DL (ref 0.5–1.3)
EGFRCR SERPLBLD CKD-EPI 2021: 62 ML/MIN/1.73M*2
GLUCOSE SERPL-MCNC: 86 MG/DL (ref 74–99)
HCV AB SER QL: NONREACTIVE
POTASSIUM SERPL-SCNC: 5.3 MMOL/L (ref 3.5–5.3)
SODIUM SERPL-SCNC: 130 MMOL/L (ref 136–145)
VIT B12 SERPL-MCNC: 491 PG/ML (ref 211–911)

## 2024-09-30 PROCEDURE — 86803 HEPATITIS C AB TEST: CPT

## 2024-09-30 PROCEDURE — 36415 COLL VENOUS BLD VENIPUNCTURE: CPT

## 2024-09-30 PROCEDURE — 80048 BASIC METABOLIC PNL TOTAL CA: CPT

## 2024-09-30 PROCEDURE — 82607 VITAMIN B-12: CPT

## 2024-09-30 PROCEDURE — 82306 VITAMIN D 25 HYDROXY: CPT

## 2024-10-01 ENCOUNTER — APPOINTMENT (OUTPATIENT)
Dept: PRIMARY CARE | Facility: CLINIC | Age: 69
End: 2024-10-01
Payer: MEDICARE

## 2024-10-01 VITALS
TEMPERATURE: 97.9 F | HEIGHT: 65 IN | WEIGHT: 137.5 LBS | BODY MASS INDEX: 22.91 KG/M2 | HEART RATE: 57 BPM | DIASTOLIC BLOOD PRESSURE: 52 MMHG | OXYGEN SATURATION: 96 % | SYSTOLIC BLOOD PRESSURE: 120 MMHG

## 2024-10-01 DIAGNOSIS — E78.2 MIXED HYPERLIPIDEMIA: ICD-10-CM

## 2024-10-01 DIAGNOSIS — D50.9 IRON DEFICIENCY ANEMIA, UNSPECIFIED IRON DEFICIENCY ANEMIA TYPE: ICD-10-CM

## 2024-10-01 DIAGNOSIS — N40.1 BPH WITH OBSTRUCTION/LOWER URINARY TRACT SYMPTOMS: ICD-10-CM

## 2024-10-01 DIAGNOSIS — G40.909 SEIZURE DISORDER (MULTI): ICD-10-CM

## 2024-10-01 DIAGNOSIS — N13.8 BPH WITH OBSTRUCTION/LOWER URINARY TRACT SYMPTOMS: ICD-10-CM

## 2024-10-01 DIAGNOSIS — E53.8 VITAMIN B 12 DEFICIENCY: ICD-10-CM

## 2024-10-01 DIAGNOSIS — I25.10 CORONARY ARTERY DISEASE INVOLVING NATIVE CORONARY ARTERY OF NATIVE HEART WITHOUT ANGINA PECTORIS: ICD-10-CM

## 2024-10-01 DIAGNOSIS — Z87.19 HISTORY OF GI BLEED: ICD-10-CM

## 2024-10-01 DIAGNOSIS — I10 PRIMARY HYPERTENSION: Primary | ICD-10-CM

## 2024-10-01 DIAGNOSIS — J44.9 CHRONIC OBSTRUCTIVE PULMONARY DISEASE, UNSPECIFIED COPD TYPE (MULTI): ICD-10-CM

## 2024-10-01 DIAGNOSIS — H35.3221 EXUDATIVE AGE-RELATED MACULAR DEGENERATION, LEFT EYE, WITH ACTIVE CHOROIDAL NEOVASCULARIZATION: ICD-10-CM

## 2024-10-01 DIAGNOSIS — I48.0 PAF (PAROXYSMAL ATRIAL FIBRILLATION) (MULTI): ICD-10-CM

## 2024-10-01 DIAGNOSIS — I27.20 PULMONARY HYPERTENSION (MULTI): ICD-10-CM

## 2024-10-01 DIAGNOSIS — E55.9 VITAMIN D DEFICIENCY: ICD-10-CM

## 2024-10-01 DIAGNOSIS — Z23 NEED FOR VACCINATION: ICD-10-CM

## 2024-10-01 PROCEDURE — G0008 ADMIN INFLUENZA VIRUS VAC: HCPCS | Performed by: FAMILY MEDICINE

## 2024-10-01 PROCEDURE — 1159F MED LIST DOCD IN RCRD: CPT | Performed by: FAMILY MEDICINE

## 2024-10-01 PROCEDURE — 90662 IIV NO PRSV INCREASED AG IM: CPT | Performed by: FAMILY MEDICINE

## 2024-10-01 PROCEDURE — 3078F DIAST BP <80 MM HG: CPT | Performed by: FAMILY MEDICINE

## 2024-10-01 PROCEDURE — 99214 OFFICE O/P EST MOD 30 MIN: CPT | Performed by: FAMILY MEDICINE

## 2024-10-01 PROCEDURE — 1160F RVW MEDS BY RX/DR IN RCRD: CPT | Performed by: FAMILY MEDICINE

## 2024-10-01 PROCEDURE — 91322 SARSCOV2 VAC 50 MCG/0.5ML IM: CPT | Performed by: FAMILY MEDICINE

## 2024-10-01 PROCEDURE — 3008F BODY MASS INDEX DOCD: CPT | Performed by: FAMILY MEDICINE

## 2024-10-01 PROCEDURE — 90480 ADMN SARSCOV2 VAC 1/ONLY CMP: CPT | Performed by: FAMILY MEDICINE

## 2024-10-01 PROCEDURE — 3074F SYST BP LT 130 MM HG: CPT | Performed by: FAMILY MEDICINE

## 2024-10-01 RX ORDER — TAMSULOSIN HYDROCHLORIDE 0.4 MG/1
0.4 CAPSULE ORAL DAILY
Qty: 90 CAPSULE | Refills: 0 | Status: SHIPPED | OUTPATIENT
Start: 2024-10-01

## 2024-10-01 RX ORDER — PHENOBARBITAL 64.8 MG/1
64.8 TABLET ORAL 2 TIMES DAILY
Qty: 180 TABLET | Refills: 0 | Status: SHIPPED | OUTPATIENT
Start: 2024-10-01

## 2024-10-01 RX ORDER — PANTOPRAZOLE SODIUM 40 MG/1
40 TABLET, DELAYED RELEASE ORAL
Qty: 90 TABLET | Refills: 0 | Status: SHIPPED | OUTPATIENT
Start: 2024-10-01

## 2024-10-01 NOTE — PATIENT INSTRUCTIONS
Follow up in 3 months.    It was a pleasure to see you today. Thank you for choosing us for your health care needs.    If you have lab or other testing completed and have not been informed of results within one week, please call the office for your results.    If you haven't done so, consider signing up for [facility], the [facility] personal health record. Ask the staff how you can get started.

## 2024-10-01 NOTE — PROGRESS NOTES
"Subjective   Patient ID: Gold Summers is a 69 y.o. male who presents for Follow-up.    HPI     Pt states he would like to restart the tamsulosin as he \"cannot go as well\" and it has been more difficult to urinate.    Labs: 9/30/2024  Colonoscopy: 07/2021    Patient has hypertension.  He does monitor readings at home.   BP typically 130s/70-80s.  He denies CP, SOB at this time.  He is taking Metoprolol.      He has hyperlipidemia.  He tries to limit the amount of fatty foods and high cholesterol foods that he consumes in his diet.  Patient states that he has been compliant with the dosing of his statin therapy.     Pt has CAD.  Presently denies any chest pain shortness of breath.  9/16/2022: Underwent off-pump coronary bypass with R bilateral pulmonary vein isolation LENCHO to LAD, and LIMA to OM, left atrial appendage ligation with a 45 mm clip by Dr. Sawyer Ibrahim      He had atrial fibrillation.  Pt has maintained sinus rhythm after surgery.  He was not restarted on anticoagulation after left appendage ligation 9/2022.  Antiarrhythmic therapy: Metoprolol succinate 50 mg daily  Antiplatelet therapy: Aspirin 81 mg daily, clopidogrel 75 mg daily  Anticoagulation therapy: none     He has a seizure disorder.  He has not had a seizure for years and has been maintained on phenobarbital without noted side effects.     He has vitamin D deficiency.  Patient states that he is compliant with his vitamin D supplementation.     He unfortunately continues to smoke despite my advice and encouragement.      Pt has carotid artery stenosis.  Patient underwent right CEA 1/27/2021 with Dr. Taylor.     Pt has COPD.  Pt was previously treated with Advair but he discontinued it when he did not note any improvement in his breathing.  He feels his breathing is at baseline and he denies any exacerbations since his last visit.     Pt has anemia.   He was previously referred to GI and hematology for poor iron absorption  Was treated with IV " iron in the past.  Currently on oral iron.     Pt understands with verbalization that vaccines all have risks (to include: local reaction, systemic reaction). Pt has reviewed the VIS (Vaccine information sheet) and gives consent to have this vaccination despite the risks.      OARRS:  Miguel Garcia,  on 10/1/2024 11:40 AM  I have personally reviewed the OARRS report for Gold Summers. I have considered the risks of abuse, dependence, addiction and diversion and I believe that it is clinically appropriate for Gold Summers to be prescribed this medication    Is the patient prescribed a combination of a benzodiazepine and opioid?  No    Last Urine Drug Screen / ordered today: No  Recent Results (from the past 8760 hour(s))   Confirmation Opiate/Opioid/Benzo Prescription Compliance    Collection Time: 07/02/24 10:43 AM   Result Value Ref Range    Clonazepam <25 <25 ng/mL    7-Aminoclonazepam <25 <25 ng/mL    Alprazolam <25 <25 ng/mL    Alpha-Hydroxyalprazolam <25 <25 ng/mL    Midazolam <25 <25 ng/mL    Alpha-Hydroxymidazolam <25 <25 ng/mL    Chlordiazepoxide <25 <25 ng/mL    Diazepam <25 <25 ng/mL    Nordiazepam <25 <25 ng/mL    Temazepam <25 <25 ng/mL    Oxazepam <25 <25 ng/mL    Lorazepam <25 <25 ng/mL    Methadone <25 <25 ng/mL    EDDP <25 <25 ng/mL    6-Acetylmorphine <25 <25 ng/mL    Codeine <50 <50 ng/mL    Hydrocodone <25 <25 ng/mL    Hydromorphone <25 <25 ng/mL    Morphine  <50 <50 ng/mL    Norhydrocodone <25 <25 ng/mL    Noroxycodone <25 <25 ng/mL    Oxycodone <25 <25 ng/mL    Oxymorphone <25 <25 ng/mL    Fentanyl <2.5 <2.5 ng/mL    Norfentanyl <2.5 <2.5 ng/mL    Tramadol <50 <50 ng/mL    O-Desmethyltramadol <50 <50 ng/mL    Zolpidem <25 <25 ng/mL    Zolpidem Metabolite (ZCA) <25 <25 ng/mL   Screen Opiate/Opioid/Benzo Prescription Compliance    Collection Time: 07/02/24 10:43 AM   Result Value Ref Range    Creatinine, Urine Random 212.1 20.0 - 370.0 mg/dL    Amphetamine Screen, Urine Presumptive Negative  "Presumptive Negative    Barbiturate Screen, Urine Presumptive Negative Presumptive Negative    Cannabinoid Screen, Urine Presumptive Negative Presumptive Negative    Cocaine Metabolite Screen, Urine Presumptive Negative Presumptive Negative    PCP Screen, Urine Presumptive Negative Presumptive Negative     Results are as expected.         Controlled Substance Agreement:  Date of the Last Agreement: 7/2/2024  Reviewed Controlled Substance Agreement including but not limited to the benefits, risks, and alternatives to treatment with a Controlled Substance medication(s).    Anticonvulsant:   What is the patient's goal of therapy? Prevent seizures  Is this being achieved with current treatment? yes    Activities of Daily Living:   Is your overall impression that this patient is benefiting (symptom reduction outweighs side effects) from Anticonvulsant therapy? Yes     1. Physical Functioning: Same  2. Family Relationship: Same  3. Social Relationship: Same  4. Mood: Same  5. Sleep Patterns: Same  6. Overall Function: Same      Review of Systems  Constitutional: Patient denies any fever, chills, loss of appetite, or unexplained weight loss.  Cardiovascular: Patient denies any chest pain, shortness of breath with exertion, tachycardia, palpitations, orthopnea, or paroxysmal nocturnal dyspnea.  Respiratory: Patient denies any cough, shortness breath, or wheezing.  Gastrointestinal: Patient denies any nausea, vomiting, diarrhea, constipation, melena, hematochezia, or reflux symptoms  Skin: Denies any rashes or skin lesions  Neurology: Patient denies any new motor or sensory losses. Denies any numbness, tingling, weakness, and incoordination of the extremities. Patient also denies any tremor, seizures, or gait instability.  Endocrinology: Denies any polyuria, polydipsia, polyphagia, or heat/cold intolerance.      Objective   /52   Pulse 57   Temp 36.6 °C (97.9 °F) (Temporal)   Ht 1.651 m (5' 5\")   Wt 62.4 kg (137 lb " 8 oz)   SpO2 96%   BMI 22.88 kg/m²     Physical Exam  General Appearance: Alert and cooperative, in no acute distress, well-developed/well-nourished.  Neck: Supple and without adenopathy or rigidity. There is no JVD at 90° and no carotid bruits are noted. There is no thyromegaly, thyroid tenderness, or palpable thyroid nodules.  Heart: Regular rate and rhythm without murmur or ectopy.  Lungs: Clear to auscultation bilaterally with good air exchange.  Skin: Good turgor, moist, warm and without rashes or lesions.  Neurological exam: Alert and oriented ×3, no tremor, normal gait.  Extremities: No clubbing, cyanosis, or edema      Assessment/Plan     HTN:  Blood pressure appears adequately controlled and we will continue with the current antihypertensive therapy.    Hyperlipidemia: Patient will continue with the current medication.  Dietary changes, exercise, and maintenance of healthy weight were discussed at length.    Coronary artery disease:   Patient is without worrisome signs or symptoms for unstable angina.  Risk factor modification was discussed at length.  Dietary changes, exercise and maintenance of a healthy weight were discussed.  Patient underwent CABGX2 and left atrial appendage ligation during the 8/15/2022 CABG.  Should continue to follow with cardiology.    Paroxysmal atrial fibrillation:  Stable based on symptoms.  Patient underwent left atrial appendage ligation after anticoagulation had to be discontinued 7/2022 as a result of a GI bleed.  Will continue with the current treatment plan.    Iron deficiency anemia:  Previously treated with iron infusions and now maintained on oral therapy.  He will continue with the oral iron and we will continue to monitor.  Encouraged to follow with rheumatology as well.     COPD:  Stable based on symptoms.  Smoking cessation again encouraged.  Currently untreated as he discontinued Advair on his own in the past.    BPH:    He has been experiencing some difficulty  urinating.   Will restart the tamsulosin to be taken once daily at bedtime.  - tamsulosin (Flomax) 0/4 mg 24 hr capsule; Take 1 capsule (0.4 mg) by mouth once daily. Dispense: 90 capsule; Refill: 0    Seizure disorder:  Stable based on symptoms.  Will continue the phenobarbital at the current dose.    Vitamin B12 deficiency:  Vitamin B12 on last labs was normal.   Patient will continue to take over-the-counter vitamin B12 (1000 mcg) daily.   We will continue to monitor.    Vitamin D deficiency:  Patient to continue on the current dose of vitamin D supplementation.    Pulmonary hypertension:  Stable based on symptoms.  Patient should also continue to follow with cardiology.    History of GI bleed:  Stable based on symptoms.   Will refill his pantoprazole.    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  Continue to follow with ophthalmology.    Need for vaccination:  Flu and COVID vaccines administered today.      PSA due 11/18/2023  COLONOSCOPY DUE 7/2024  MCAW due 12/2024    Follow up in 3 months.      Scribe Attestation  By signing my name below, I, Rufus Munoz   attest that this documentation has been prepared under the direction and in the presence of Miguel Garcia DO.    Orders Placed This Encounter   Procedures    Flu vaccine, trivalent, preservative free, HIGH-DOSE, age 65y+ (Fluzone)    Moderna COVID-19 vaccine, monovalent, age 12 years and older, (50mcg/0.5mL)(Spikevax)     Requested Prescriptions     Signed Prescriptions Disp Refills    PHENobarbitaL 64.8 mg tablet 180 tablet 0     Sig: Take 1 tablet (64.8 mg) by mouth 2 times a day.    pantoprazole (ProtoNix) 40 mg EC tablet 90 tablet 0     Sig: Take 1 tablet (40 mg) by mouth once daily in the morning. Take before meals.    tamsulosin (Flomax) 0.4 mg 24 hr capsule 90 capsule 0     Sig: Take 1 capsule (0.4 mg) by mouth once daily.

## 2024-10-07 ENCOUNTER — APPOINTMENT (OUTPATIENT)
Dept: CARDIOLOGY | Facility: CLINIC | Age: 69
End: 2024-10-07
Payer: MEDICARE

## 2024-10-14 ENCOUNTER — APPOINTMENT (OUTPATIENT)
Dept: CARDIOLOGY | Facility: CLINIC | Age: 69
End: 2024-10-14
Payer: MEDICARE

## 2024-10-14 VITALS
SYSTOLIC BLOOD PRESSURE: 132 MMHG | DIASTOLIC BLOOD PRESSURE: 70 MMHG | HEART RATE: 70 BPM | WEIGHT: 137 LBS | HEIGHT: 65 IN | BODY MASS INDEX: 22.82 KG/M2

## 2024-10-14 DIAGNOSIS — E87.1 HYPONATREMIA: ICD-10-CM

## 2024-10-14 DIAGNOSIS — I25.10 ATHEROSCLEROSIS OF NATIVE CORONARY ARTERY OF NATIVE HEART, UNSPECIFIED WHETHER ANGINA PRESENT: Primary | ICD-10-CM

## 2024-10-14 DIAGNOSIS — F17.200 CURRENT SMOKER: ICD-10-CM

## 2024-10-14 DIAGNOSIS — E78.2 MIXED HYPERLIPIDEMIA: ICD-10-CM

## 2024-10-14 DIAGNOSIS — I10 PRIMARY HYPERTENSION: ICD-10-CM

## 2024-10-14 PROCEDURE — 1159F MED LIST DOCD IN RCRD: CPT | Performed by: INTERNAL MEDICINE

## 2024-10-14 PROCEDURE — 3008F BODY MASS INDEX DOCD: CPT | Performed by: INTERNAL MEDICINE

## 2024-10-14 PROCEDURE — G2211 COMPLEX E/M VISIT ADD ON: HCPCS | Performed by: INTERNAL MEDICINE

## 2024-10-14 PROCEDURE — 99214 OFFICE O/P EST MOD 30 MIN: CPT | Performed by: INTERNAL MEDICINE

## 2024-10-14 PROCEDURE — 3078F DIAST BP <80 MM HG: CPT | Performed by: INTERNAL MEDICINE

## 2024-10-14 PROCEDURE — 3075F SYST BP GE 130 - 139MM HG: CPT | Performed by: INTERNAL MEDICINE

## 2024-10-14 RX ORDER — NITROGLYCERIN 0.4 MG/1
TABLET SUBLINGUAL
Qty: 25 TABLET | Refills: 5 | Status: SHIPPED | OUTPATIENT
Start: 2024-10-14

## 2024-10-14 NOTE — PATIENT INSTRUCTIONS
STOP SMOKING      DID YOU KNOW  We have a pharmacy here in the [facility].  They can fill all prescriptions, not just cardiac medications.  Prescriptions from other pharmacies can easily be transferred to the  pharmacy by the  pharmacist on site.   pharmacies offer FREE HOME DELIVERY on medications to anywhere in [place]. They can sync your medications. Typically prescriptions can be ready in 10 - 15 minutes. If pharmacy is unable to fill your  prescription or if cost is more than your paying now the Pharmacist can easily transfer back to your Pharmacy of choice. Pharmacy phone # [phone].       Please bring all medicines, vitamins, and herbal supplements with you in original bottles to every appointment!!!!    Prescriptions will not be filled unless you are compliant with your follow up appointments or have a follow up appointment scheduled as per instruction of your physician. Refills should be requested at the time of your visit.

## 2024-10-14 NOTE — PROGRESS NOTES
Patient:  Gold Summers  YOB: 1955  MRN: [id number]       Impression/Plan:     Diagnoses and all orders for this visit:  Atherosclerosis of native coronary artery of native heart, unspecified whether angina present  -     Normal functional capacity he is now just 3 years since bypass graft surgery no evidence to suggest ischemia or CHF  Primary hypertension  -     Well-controlled without adverse effect of the current medications  Mixed hyperlipidemia  -     Tolerates statin without adverse effect  Current smoker  -    Discussed importance of stopping cigarette smoking he has been able to do that in the past for several years.  Hyponatremia        -     Phenobarbital has a least a 3% incidence of hyponatremia and I am concerned that may be the case.  I do not believe his current cardiac medications are contributing.  He tells me he has follow-up with Dr. Garcia to explore the possible causes of hyponatremia.  I do not believe is his current cardiac meds and he has no evidence of volume overload that would explain hyponatremia from a cardiac standpoint.      Chief Complaint/Active Symptoms:       Gold Summers is a 69 y.o. male who presents with  coronary artery disease having had off-pump bypass graft surgery 8/16/2022 R-LAD, L-OM, left atrial appendage ligation with 45 mm clip.  Also with surgical ablation lines right antral pulmonary vein left antral pulmonary vein.  Has been seen in postop surgical follow-up 9/5/2023 and was felt stable postoperatively.  No recurrence of atrial fibrillation documented at that visit.  Prior Holter monitor August 20 demonstrated no atrial fibrillation though frequent PACs occasional PVC.  Also with carotid artery disease CT angiography with 60% stenosis bilaterally with patent right carotid endarterectomy.     He was in this office 10/10/2023 at which time was asymptomatic from a cardiovascular standpoint.  He is encouraged to stop smoking at that time.  Blood  pressure is well-controlled.  He also was being considered for penile implant at that time    He was seen in the surgical arrhythmia clinic 9/6/2024 given that he had had right and left antral pulmonary vein isolation and left atrial appendage ligation.  He was felt to be doing quite well.  He had a monitor showing no atrial fibrillation or tachycardia.    BMP September 2024 sodium 130 potassium 5.3 creatinine 1.25    He denies angina, dyspnea, palpitation, edema, lightheadedness or syncope.  He has had no symptoms of claudication or neurologic deterioration.  There have been no hospitalizations or emergency room visits since last office visit.    He continues to smoke cigarettes regularly.               Review of Systems: Unremarkable except as noted above    Meds     Current Outpatient Medications   Medication Instructions    aspirin 81 mg EC tablet 1 tablet, oral, Daily    atorvastatin (LIPITOR) 80 mg, oral, Nightly    cholecalciferol (Vitamin D-3) 50 MCG (2000 UT) tablet 1 tablet, oral, Daily    clopidogrel (PLAVIX) 75 mg, oral, Daily    cyanocobalamin (VITAMIN B-12) 1,000 mcg, oral, Daily    ferrous sulfate (325 mg ferrous sulfate) 325 mg, oral, Daily with breakfast    furosemide (LASIX) 20 mg, oral, Daily    metoprolol succinate XL (TOPROL-XL) 50 mg, oral, Daily    nitroglycerin (Nitrostat) 0.4 mg SL tablet As directed as needed for chest pain.  May repeat every 5 min. X3.  Call 911 if 3rd dose needed    olmesartan (BENICAR) 20 mg, oral, Daily    pantoprazole (PROTONIX) 40 mg, oral, Daily before breakfast    PHENobarbitaL 64.8 mg, oral, 2 times daily    saw/vit E/sod jens/lyc/beta/pyg (PROSTATE HEALTH ORAL) 1 tablet, oral, Daily    tamsulosin (FLOMAX) 0.4 mg, oral, Daily        Allergies     Allergies   Allergen Reactions    Penicillins Other     Myalgia, vomiting         Annotated Problems     Specialty Problems          Cardiology Problems    History of carotid endarterectomy     January 2021 right carotid  endarterectomy with bovine patch         Smoker    PAF (paroxysmal atrial fibrillation) (Multi)    Coronary artery disease involving native coronary artery of native heart without angina pectoris    Primary hypertension    Bilateral carotid artery stenosis     2022 CT angiography with 60% stenosis bilaterally  January 2021 right carotid endarterectomy with bovine patch         CAD (coronary atherosclerotic disease)     8/16/2022 R-LAD, L-OM, left atrial appendage ligation with 45 mm clip.  Also with surgical ablation lines right antral pulmonary vein left antral pulmonary vein.    July 2022 cath: LVEDP 12-15.  Proximal LAD 75%.  LM-irregular.  Proximal CX-75%.  Dominant vessel.  RCA nondominant.         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    Iron deficiency anemia 03/22/2023    Vitamin B 12 deficiency 03/22/2023    Colonic polyp 03/22/2023    Anemia 02/04/2023    Bilateral carotid artery stenosis 02/04/2023    BPH with obstruction/lower urinary tract symptoms 02/04/2023    CAD (coronary atherosclerotic disease) 02/04/2023    Cholelithiases 02/04/2023    Closed fracture of left hip 02/04/2023    Constipation 02/04/2023    COPD (chronic obstructive pulmonary disease) (Multi) 02/04/2023    Current smoker 02/04/2023    Duodenal ulcer 02/04/2023    Mixed hyperlipidemia 02/04/2023    Inability to attain erection 02/04/2023    Low back pain 02/04/2023    Neck pain 02/04/2023    Peyronie disease 02/04/2023    Pulmonary  "hypertension (Multi) 02/04/2023    S/P CABG (coronary artery bypass graft) 02/04/2023    Shoulder pain, left 02/04/2023    Small vessel disease, cerebrovascular 02/04/2023    TIA (transient ischemic attack) 02/04/2023    Tubular adenoma of colon 02/04/2023    Ventricular tachycardia (paroxysmal) (Multi) 02/04/2023    Vitamin D deficiency 02/04/2023    Tricuspid insufficiency 02/04/2023    B12 deficiency anemia 02/04/2023    Colon polyps 02/04/2023    Coronary artery disease involving native coronary artery of native heart without angina pectoris 08/19/2022    Primary hypertension 08/19/2022    Chronic pulmonary edema 08/19/2022    Acute blood loss anemia 07/03/2022    PAF (paroxysmal atrial fibrillation) (Multi) 07/03/2022    ELIANA (acute kidney injury) (CMS-HCC) 06/28/2022    Smoker 08/11/2021    Disorder of implanted penile prosthesis (CMS-HCC) 08/11/2021    Seizure disorder (Multi) 08/11/2021    History of carotid endarterectomy 08/10/2021    Closed fracture of neck of left femur 08/09/2021    Fracture of neck of femur (Multi) 08/09/2021    Acute cholecystitis with chronic cholecystitis 04/15/2021    Chronic cholecystitis with calculus 02/05/2021    Erectile dysfunction due to arterial insufficiency 02/22/2012    Impotence of organic origin 02/22/2012    Malfunction of penile prosthesis (CMS-HCC) 02/22/2012       Objective:     Vitals:    10/14/24 0912   BP: 132/70   BP Location: Left arm   Patient Position: Sitting   Pulse: 70   Weight: 62.1 kg (137 lb)   Height: 1.651 m (5' 5\")      Wt Readings from Last 4 Encounters:   10/14/24 62.1 kg (137 lb)   10/01/24 62.4 kg (137 lb 8 oz)   08/10/24 59 kg (130 lb 1.1 oz)   07/02/24 61.6 kg (135 lb 14.4 oz)           LAB:     Lab Results   Component Value Date    WBC 7.8 03/26/2024    HGB 16.2 03/26/2024    HCT 48.1 03/26/2024     03/26/2024    CHOL 136 03/26/2024    TRIG 107 03/26/2024    HDL 42.8 03/26/2024    ALT 30 03/26/2024    AST 28 03/26/2024     (L) " 09/30/2024    K 5.3 09/30/2024    CL 97 (L) 09/30/2024    CREATININE 1.25 09/30/2024    BUN 17 09/30/2024    CO2 28 09/30/2024    TSH 6.31 (H) 09/07/2022    PSA 2.35 11/17/2022    INR 0.9 06/16/2023    HGBA1C 6.4 (A) 11/23/2022       Diagnostic Studies:     Urgent Care Xray    Result Date: 8/10/2024  Interpreted By:  Sukhdev Greer, STUDY: XR URGENT CARE XRAY; ;  8/10/2024 4:47 pm   INDICATION: Signs/Symptoms:Injury. Signs/Symptoms:Injury pain   COMPARISON: None.   ACCESSION NUMBER(S): [id number]   ORDERING CLINICIAN: DAVID STUBBS   TECHNIQUE: Three views left foot   FINDINGS: Hindfoot osseous structures demonstrate mild calcaneal spurring plantar fascial origin. Midfoot and intertarsal articulations demonstrate no evidence for fracture. Skin marker seen about the 3rd metatarsal shafts. There is no cortical or trabecular discontinuity. MTP joints are maintained. Digits distally demonstrate no fracture or dislocation. There is a bipartite tibial sesamoid bone at the 1st MTP joint.       1. No fracture visualized portions of the foot in particular about the 3rd metatarsal.     MACRO: None   Signed by: Sukhdev Greer 8/10/2024 5:17 PM Dictation workstation:   [id number]        Radiology:     No orders to display       Physical Exam     General Appearance: alert and oriented to person, place and time, in no acute distress  Cardiovascular: normal rate, regular rhythm, normal S1 and S2, no murmurs, rubs, clicks, or gallops,  no JVD  Pulmonary/Chest: clear to auscultation bilaterally- no wheezes, rales or rhonchi, normal air movement, no respiratory distress  Abdomen: soft, non-tender, non-distended, normal bowel sounds, no masses   Extremities: no cyanosis, clubbing or edema  Skin: warm and dry, no rash or erythema  Eyes: EOMI  Neck: supple and non-tender without mass, no thyromegaly   Neurological: alert, oriented, normal speech, no focal findings or movement disorder noted                Scribe Attestation  By  signing my name below, I, Arlyn Mosqueda LPN , Scribe   attest that this documentation has been prepared under the direction and in the presence of Jamal Nguyen MD.

## 2024-12-06 DIAGNOSIS — D50.9 IRON DEFICIENCY ANEMIA, UNSPECIFIED: ICD-10-CM

## 2024-12-06 DIAGNOSIS — I25.10 ARTERIOSCLEROSIS OF CORONARY ARTERY: ICD-10-CM

## 2024-12-06 DIAGNOSIS — I65.23 BILATERAL CAROTID ARTERY OCCLUSION: ICD-10-CM

## 2024-12-07 RX ORDER — FERROUS SULFATE 325(65) MG
1 TABLET ORAL
Qty: 90 TABLET | Refills: 0 | Status: SHIPPED | OUTPATIENT
Start: 2024-12-07

## 2024-12-07 RX ORDER — ATORVASTATIN CALCIUM 80 MG/1
80 TABLET, FILM COATED ORAL NIGHTLY
Qty: 90 TABLET | Refills: 0 | Status: SHIPPED | OUTPATIENT
Start: 2024-12-07

## 2024-12-07 RX ORDER — CLOPIDOGREL BISULFATE 75 MG/1
75 TABLET ORAL DAILY
Qty: 90 TABLET | Refills: 0 | Status: SHIPPED | OUTPATIENT
Start: 2024-12-07

## 2024-12-10 DIAGNOSIS — I10 PRIMARY HYPERTENSION: ICD-10-CM

## 2024-12-10 RX ORDER — OLMESARTAN MEDOXOMIL 20 MG/1
20 TABLET ORAL DAILY
Qty: 100 TABLET | Refills: 0 | Status: SHIPPED | OUTPATIENT
Start: 2024-12-10

## 2024-12-23 DIAGNOSIS — G40.909 SEIZURE DISORDER (MULTI): ICD-10-CM

## 2024-12-23 RX ORDER — PHENOBARBITAL 64.8 MG/1
64.8 TABLET ORAL 2 TIMES DAILY
Qty: 180 TABLET | Refills: 0 | Status: SHIPPED | OUTPATIENT
Start: 2024-12-23

## 2024-12-27 DIAGNOSIS — Z87.19 HISTORY OF GI BLEED: ICD-10-CM

## 2024-12-30 ENCOUNTER — APPOINTMENT (OUTPATIENT)
Dept: PRIMARY CARE | Facility: CLINIC | Age: 69
End: 2024-12-30
Payer: MEDICARE

## 2024-12-30 DIAGNOSIS — Z87.19 HISTORY OF GI BLEED: ICD-10-CM

## 2024-12-30 DIAGNOSIS — I10 PRIMARY HYPERTENSION: ICD-10-CM

## 2024-12-30 DIAGNOSIS — I48.0 PAF (PAROXYSMAL ATRIAL FIBRILLATION) (MULTI): ICD-10-CM

## 2024-12-30 DIAGNOSIS — H35.3221 EXUDATIVE AGE-RELATED MACULAR DEGENERATION, LEFT EYE, WITH ACTIVE CHOROIDAL NEOVASCULARIZATION: ICD-10-CM

## 2024-12-30 DIAGNOSIS — J44.9 CHRONIC OBSTRUCTIVE PULMONARY DISEASE, UNSPECIFIED COPD TYPE (MULTI): ICD-10-CM

## 2024-12-30 DIAGNOSIS — N13.8 BPH WITH OBSTRUCTION/LOWER URINARY TRACT SYMPTOMS: ICD-10-CM

## 2024-12-30 DIAGNOSIS — G40.909 SEIZURE DISORDER (MULTI): ICD-10-CM

## 2024-12-30 DIAGNOSIS — E55.9 VITAMIN D DEFICIENCY: ICD-10-CM

## 2024-12-30 DIAGNOSIS — D50.9 IRON DEFICIENCY ANEMIA, UNSPECIFIED IRON DEFICIENCY ANEMIA TYPE: ICD-10-CM

## 2024-12-30 DIAGNOSIS — E53.8 VITAMIN B 12 DEFICIENCY: ICD-10-CM

## 2024-12-30 DIAGNOSIS — I27.20 PULMONARY HYPERTENSION (MULTI): ICD-10-CM

## 2024-12-30 DIAGNOSIS — E78.2 MIXED HYPERLIPIDEMIA: ICD-10-CM

## 2024-12-30 DIAGNOSIS — I25.10 CORONARY ARTERY DISEASE INVOLVING NATIVE CORONARY ARTERY OF NATIVE HEART WITHOUT ANGINA PECTORIS: ICD-10-CM

## 2024-12-30 DIAGNOSIS — N40.1 BPH WITH OBSTRUCTION/LOWER URINARY TRACT SYMPTOMS: ICD-10-CM

## 2024-12-30 PROCEDURE — 1123F ACP DISCUSS/DSCN MKR DOCD: CPT | Performed by: FAMILY MEDICINE

## 2024-12-30 RX ORDER — PANTOPRAZOLE SODIUM 40 MG/1
40 TABLET, DELAYED RELEASE ORAL
Qty: 90 TABLET | Refills: 0 | OUTPATIENT
Start: 2024-12-30

## 2025-01-06 DIAGNOSIS — Z87.19 HISTORY OF GI BLEED: ICD-10-CM

## 2025-01-06 RX ORDER — PANTOPRAZOLE SODIUM 40 MG/1
40 TABLET, DELAYED RELEASE ORAL
Qty: 90 TABLET | Refills: 0 | Status: SHIPPED | OUTPATIENT
Start: 2025-01-06

## 2025-01-08 DIAGNOSIS — N40.1 BPH WITH OBSTRUCTION/LOWER URINARY TRACT SYMPTOMS: ICD-10-CM

## 2025-01-08 DIAGNOSIS — N13.8 BPH WITH OBSTRUCTION/LOWER URINARY TRACT SYMPTOMS: ICD-10-CM

## 2025-01-08 RX ORDER — TAMSULOSIN HYDROCHLORIDE 0.4 MG/1
0.4 CAPSULE ORAL DAILY
Qty: 90 CAPSULE | Refills: 0 | Status: SHIPPED | OUTPATIENT
Start: 2025-01-08

## 2025-03-04 DIAGNOSIS — I10 PRIMARY HYPERTENSION: ICD-10-CM

## 2025-03-04 RX ORDER — OLMESARTAN MEDOXOMIL 20 MG/1
20 TABLET ORAL DAILY
Qty: 90 TABLET | Refills: 1 | OUTPATIENT
Start: 2025-03-04

## 2025-03-04 NOTE — TELEPHONE ENCOUNTER
Medication refused due to failing protocol.    Requested Prescriptions   Pending Prescriptions Disp Refills    olmesartan (BENIcar) 20 mg tablet [Pharmacy Med Name: OLMESARTAN MEDOXOMIL 20 MG TAB] 90 tablet 1     Sig: TAKE 1 TABLET BY MOUTH EVERY DAY        ARB Protocol Failed - 3/4/2025  1:22 PM        Failed - BP under 140/90 in past year or if patient has diabetes, CAD, PVD, HX of stroke or MI; BP under 130/80 in past year     BP Readings from Last 3 Encounters:   10/14/24 132/70   10/01/24 120/52   08/10/24 (!) 181/75               Passed - Visit with relevant provider in past 9 months or upcoming 90 days     Recent Visits  Date Type Provider Dept   10/01/24 Office Visit Miguel Garcia DO [id number]   07/02/24 Office Visit Miguel Garcia,  [id number]   Showing recent visits within past 270 days and meeting all other requirements  Future Appointments  Date Type Provider Dept   04/03/25 Appointment Miguel Garcia DO [id number]   Showing future appointments within next 90 days and meeting all other requirements            Passed - Medication not refilled in past 45 days (1.5 months)     No matching medication orders between 1/18/2025  1:22 PM and 3/4/2025  1:22 PM              Passed - Normal serum creatinine on file within the past year     Creatinine   Date Value Ref Range Status   09/30/2024 1.25 0.50 - 1.30 mg/dL Final     Creatinine, Urine Random   Date Value Ref Range Status   07/02/2024 212.1 20.0 - 370.0 mg/dL Final     Comment:     A urine creatinine result >= 20 mg/dL is considered valid without suspicion of dilution.  Samples with results below this range will automatically reflex to specific  gravity testing to verify specimen integrity.             Passed - Normal serum potassium on file within the past 6 months     Potassium   Date Value Ref Range Status   09/30/2024 5.3 3.5 - 5.3 mmol/L Final     Potassium Urine Random   Date Value Ref Range Status   08/18/2022 30 Not  Established mmol/L Final     Potassium/Creatinine, Ur   Date Value Ref Range Status   08/18/2022 39 Not Established mmol/g Creat Final     Potassium, Arterial   Date Value Ref Range Status   08/19/2022 3.7 3.5 - 5.3 mmol/L Final

## 2025-03-06 DIAGNOSIS — I65.23 BILATERAL CAROTID ARTERY OCCLUSION: ICD-10-CM

## 2025-03-06 DIAGNOSIS — D50.9 IRON DEFICIENCY ANEMIA, UNSPECIFIED: ICD-10-CM

## 2025-03-06 DIAGNOSIS — I25.10 ARTERIOSCLEROSIS OF CORONARY ARTERY: ICD-10-CM

## 2025-03-10 RX ORDER — FERROUS SULFATE 325(65) MG
1 TABLET ORAL
Qty: 90 TABLET | Refills: 0 | OUTPATIENT
Start: 2025-03-10

## 2025-03-10 RX ORDER — ATORVASTATIN CALCIUM 80 MG/1
80 TABLET, FILM COATED ORAL NIGHTLY
Qty: 90 TABLET | Refills: 0 | OUTPATIENT
Start: 2025-03-10

## 2025-03-10 RX ORDER — CLOPIDOGREL BISULFATE 75 MG/1
75 TABLET ORAL DAILY
Qty: 90 TABLET | Refills: 0 | OUTPATIENT
Start: 2025-03-10

## 2025-03-10 NOTE — TELEPHONE ENCOUNTER
Medication refused due to failing protocol.    Requested Prescriptions   Pending Prescriptions Disp Refills    clopidogrel (Plavix) 75 mg tablet [Pharmacy Med Name: CLOPIDOGREL 75 MG TABLET] 90 tablet 0     Sig: TAKE 1 TABLET BY MOUTH EVERY DAY        Plavix Protocol Passed - 3/10/2025 12:18 PM        Passed - Visit with relevant provider in past 9 months or upcoming 90 days     Recent Visits  Date Type Provider Dept   10/01/24 Office Visit Miguel Garcia, DO Do [id number]   07/02/24 Office Visit Miguel Garcia DO Do [id number]   Showing recent visits within past 270 days and meeting all other requirements  Future Appointments  Date Type Provider Dept   04/03/25 Appointment Miguel Garcia DO Do [id number] [facility]   Showing future appointments within next 90 days and meeting all other requirements            Passed - No conflicting PPI on medication list        Passed - Medication not refilled in past 45 days (1.5 months)     No matching medication orders between 1/24/2025 12:18 PM and 3/10/2025 12:18 PM               atorvastatin (Lipitor) 80 mg tablet [Pharmacy Med Name: ATORVASTATIN 80 MG TABLET] 90 tablet 0     Sig: TAKE 1 TABLET (80 MG) BY MOUTH ONCE DAILY AT BEDTIME.        Hmg CoA Reductase Inhibitors Protocol Failed - 3/10/2025 12:18 PM        Failed - Normal creatine kinase in past 12 months     Total CK   Date Value Ref Range Status   06/27/2023 86 0 - 325 U/L Final             Passed - Lipid panel in past 12 months     LDL Calculated   Date Value Ref Range Status   03/26/2024 72 <=99 mg/dL Final     Comment:                                 Near   Borderline      AGE      Desirable  Optimal    High     High     Very High     0-19 Y     0 - 109     ---    110-129   >/= 130     ----    20-24 Y     0 - 119     ---    120-159   >/= 160     ----      >24 Y     0 -  99   100-129  130-159   160-189     >/=190       HDL-Cholesterol   Date Value Ref Range Status   03/26/2024 42.8 mg/dL Final     Comment:        Age       Very Low   Low     Normal    High    0-19 Y    < 35      < 40     40-45     ----  20-24 Y    ----     < 40      >45      ----        >24 Y      ----     < 40     40-60      >60       Cholesterol   Date Value Ref Range Status   03/26/2024 136 0 - 199 mg/dL Final     Comment:           Age      Desirable   Borderline High   High     0-19 Y     0 - 169       170 - 199     >/= 200    20-24 Y     0 - 189       190 - 224     >/= 225         >24 Y     0 - 199       200 - 239     >/= 240   **All ranges are based on fasting samples. Specific   therapeutic targets will vary based on patient-specific   cardiac risk.    Pediatric guidelines reference:Pediatrics 2011, 128(S5).Adult guidelines reference: NCEP ATPIII Guidelines,FABIO 2001, 258:2486-97    Venipuncture immediately after or during the administration of Metamizole may lead to falsely low results. Testing should be performed immediately prior to Metamizole dosing.     Cholesterol/HDL Ratio   Date Value Ref Range Status   03/26/2024 3.2  Final     Comment:       Ref Values  Desirable  < 3.4  High Risk  > 5.0     Triglycerides   Date Value Ref Range Status   03/26/2024 107 0 - 149 mg/dL Final     Comment:        Age         Desirable   Borderline High   High     Very High   0 D-90 D    19 - 174         ----         ----        ----  91 D- 9 Y     0 -  74        75 -  99     >/= 100      ----    10-19 Y     0 -  89        90 - 129     >/= 130      ----    20-24 Y     0 - 114       115 - 149     >/= 150      ----         >24 Y     0 - 149       150 - 199    200- 499    >/= 500    Venipuncture immediately after or during the administration of Metamizole may lead to falsely low results. Testing should be performed immediately prior to Metamizole dosing.             Passed - Visit with relevant provider in past 12 months or upcoming 90 days        Passed - Normal AST or ALT on file in past 12 months     AST   Date Value Ref Range Status   03/26/2024 28 9 - 39 U/L  Final     ALT   Date Value Ref Range Status   03/26/2024 30 10 - 52 U/L Final     Comment:     Patients treated with Sulfasalazine may generate falsely decreased results for ALT.             Passed - Medication not refilled in past 45 days (1.5 months)     No matching medication orders between 1/24/2025 12:18 PM and 3/10/2025 12:18 PM               ferrous sulfate tablet [Pharmacy Med Name: FERROUS SULFATE 325 MG TABLET] 90 tablet 0     Sig: TAKE 1 TABLET BY MOUTH ONCE DAILY WITH BREAKFAST.        There is no refill protocol information for this order

## 2025-03-13 DIAGNOSIS — I10 PRIMARY HYPERTENSION: ICD-10-CM

## 2025-03-13 DIAGNOSIS — I65.23 BILATERAL CAROTID ARTERY OCCLUSION: ICD-10-CM

## 2025-03-13 DIAGNOSIS — I25.10 ARTERIOSCLEROSIS OF CORONARY ARTERY: ICD-10-CM

## 2025-03-13 DIAGNOSIS — D50.9 IRON DEFICIENCY ANEMIA, UNSPECIFIED: ICD-10-CM

## 2025-03-13 RX ORDER — ATORVASTATIN CALCIUM 80 MG/1
80 TABLET, FILM COATED ORAL NIGHTLY
Qty: 90 TABLET | Refills: 0 | Status: SHIPPED | OUTPATIENT
Start: 2025-03-13

## 2025-03-13 RX ORDER — CLOPIDOGREL BISULFATE 75 MG/1
75 TABLET ORAL DAILY
Qty: 90 TABLET | Refills: 0 | Status: SHIPPED | OUTPATIENT
Start: 2025-03-13

## 2025-03-13 RX ORDER — FERROUS SULFATE 325(65) MG
1 TABLET ORAL
Qty: 90 TABLET | Refills: 0 | Status: SHIPPED | OUTPATIENT
Start: 2025-03-13

## 2025-03-13 RX ORDER — OLMESARTAN MEDOXOMIL 20 MG/1
20 TABLET ORAL DAILY
Qty: 100 TABLET | Refills: 0 | Status: SHIPPED | OUTPATIENT
Start: 2025-03-13

## 2025-03-13 NOTE — TELEPHONE ENCOUNTER
Patient's friend phones the office today to assist patient in receiving refills. Patient has an appointment on 4-3-2025 with Dr. Garcia, however, he is out of the following medications.     *Olmesartan (Benicar) 20mg 1 TAB every day   *Clopidogrel (Plavix) 75mg 1 TAB every day   *Atorvastatin (Lipitor) 80mg 1 TAB at bedtime   *Ferrous Sulfate 325mg 1 TAB [facility]

## 2025-03-22 DIAGNOSIS — I10 PRIMARY HYPERTENSION: ICD-10-CM

## 2025-03-24 RX ORDER — METOPROLOL SUCCINATE 50 MG/1
50 TABLET, EXTENDED RELEASE ORAL DAILY
Qty: 90 TABLET | Refills: 0 | Status: SHIPPED | OUTPATIENT
Start: 2025-03-24

## 2025-03-27 DIAGNOSIS — G40.909 SEIZURE DISORDER (MULTI): ICD-10-CM

## 2025-03-27 RX ORDER — PHENOBARBITAL 64.8 MG/1
64.8 TABLET ORAL 2 TIMES DAILY
Qty: 180 TABLET | Refills: 0 | Status: SHIPPED | OUTPATIENT
Start: 2025-03-27

## 2025-04-01 PROBLEM — R07.81 RIB PAIN: Status: ACTIVE | Noted: 2025-04-01

## 2025-04-01 PROBLEM — S80.00XA CONTUSION OF KNEE: Status: ACTIVE | Noted: 2025-04-01

## 2025-04-01 PROBLEM — N20.0 CALCIUM NEPHROLITHIASIS: Status: ACTIVE | Noted: 2025-04-01

## 2025-04-01 PROBLEM — R53.83 FATIGUE: Status: ACTIVE | Noted: 2025-04-01

## 2025-04-01 PROBLEM — R03.0 ELEVATED BLOOD PRESSURE READING WITHOUT DIAGNOSIS OF HYPERTENSION: Status: ACTIVE | Noted: 2025-04-01

## 2025-04-01 PROBLEM — F32.A DEPRESSIVE DISORDER: Status: ACTIVE | Noted: 2025-04-01

## 2025-04-01 PROBLEM — I65.29 STENOSIS OF CAROTID ARTERY: Status: ACTIVE | Noted: 2025-04-01

## 2025-04-01 PROBLEM — E87.70 HYPERVOLEMIA: Status: ACTIVE | Noted: 2025-04-01

## 2025-04-01 PROBLEM — I95.2 HYPOTENSION DUE TO DRUGS: Status: ACTIVE | Noted: 2025-04-01

## 2025-04-01 PROBLEM — R20.0 NUMBNESS OF FACE: Status: ACTIVE | Noted: 2025-04-01

## 2025-04-01 PROBLEM — I71.9 PENETRATING ULCER OF AORTA: Status: ACTIVE | Noted: 2025-04-01

## 2025-04-01 PROBLEM — Z86.79 HISTORY OF ATRIAL FIBRILLATION: Status: ACTIVE | Noted: 2025-04-01

## 2025-04-01 PROBLEM — Z86.39 HISTORY OF HYPERCHOLESTEROLEMIA: Status: ACTIVE | Noted: 2025-04-01

## 2025-04-01 PROBLEM — K92.2 GASTROINTESTINAL HEMORRHAGE: Status: ACTIVE | Noted: 2025-04-01

## 2025-04-01 PROBLEM — J96.00 ACUTE RESPIRATORY FAILURE: Status: ACTIVE | Noted: 2025-04-01

## 2025-04-03 ENCOUNTER — APPOINTMENT (OUTPATIENT)
Dept: PRIMARY CARE | Facility: CLINIC | Age: 70
End: 2025-04-03
Payer: MEDICARE

## 2025-04-03 DIAGNOSIS — I25.10 CORONARY ARTERY DISEASE INVOLVING NATIVE CORONARY ARTERY OF NATIVE HEART WITHOUT ANGINA PECTORIS: ICD-10-CM

## 2025-04-03 DIAGNOSIS — Z12.5 PROSTATE CANCER SCREENING: ICD-10-CM

## 2025-04-03 DIAGNOSIS — N13.8 BPH WITH OBSTRUCTION/LOWER URINARY TRACT SYMPTOMS: ICD-10-CM

## 2025-04-03 DIAGNOSIS — F17.210 CIGARETTE SMOKER: ICD-10-CM

## 2025-04-03 DIAGNOSIS — Z87.19 HISTORY OF GI BLEED: ICD-10-CM

## 2025-04-03 DIAGNOSIS — Z86.0100 HISTORY OF COLON POLYPS: ICD-10-CM

## 2025-04-03 DIAGNOSIS — I27.20 PULMONARY HYPERTENSION (MULTI): ICD-10-CM

## 2025-04-03 DIAGNOSIS — G40.909 SEIZURE DISORDER (MULTI): ICD-10-CM

## 2025-04-03 DIAGNOSIS — J44.9 CHRONIC OBSTRUCTIVE PULMONARY DISEASE, UNSPECIFIED COPD TYPE (MULTI): ICD-10-CM

## 2025-04-03 DIAGNOSIS — E53.8 VITAMIN B 12 DEFICIENCY: ICD-10-CM

## 2025-04-03 DIAGNOSIS — I10 PRIMARY HYPERTENSION: ICD-10-CM

## 2025-04-03 DIAGNOSIS — E55.9 VITAMIN D DEFICIENCY: ICD-10-CM

## 2025-04-03 DIAGNOSIS — E78.2 MIXED HYPERLIPIDEMIA: ICD-10-CM

## 2025-04-03 DIAGNOSIS — D50.9 IRON DEFICIENCY ANEMIA, UNSPECIFIED IRON DEFICIENCY ANEMIA TYPE: ICD-10-CM

## 2025-04-03 DIAGNOSIS — H35.3221 EXUDATIVE AGE-RELATED MACULAR DEGENERATION, LEFT EYE, WITH ACTIVE CHOROIDAL NEOVASCULARIZATION: ICD-10-CM

## 2025-04-03 DIAGNOSIS — Z00.00 MEDICARE ANNUAL WELLNESS VISIT, SUBSEQUENT: Primary | ICD-10-CM

## 2025-04-03 DIAGNOSIS — N40.1 BPH WITH OBSTRUCTION/LOWER URINARY TRACT SYMPTOMS: ICD-10-CM

## 2025-04-03 DIAGNOSIS — I48.0 PAF (PAROXYSMAL ATRIAL FIBRILLATION) (MULTI): ICD-10-CM

## 2025-04-03 PROBLEM — J96.00 ACUTE RESPIRATORY FAILURE: Status: RESOLVED | Noted: 2025-04-01 | Resolved: 2025-04-03

## 2025-04-03 PROCEDURE — 1123F ACP DISCUSS/DSCN MKR DOCD: CPT | Performed by: FAMILY MEDICINE

## 2025-04-03 PROCEDURE — 1159F MED LIST DOCD IN RCRD: CPT | Performed by: FAMILY MEDICINE

## 2025-04-03 PROCEDURE — G0296 VISIT TO DETERM LDCT ELIG: HCPCS | Performed by: FAMILY MEDICINE

## 2025-04-03 PROCEDURE — 1170F FXNL STATUS ASSESSED: CPT | Performed by: FAMILY MEDICINE

## 2025-04-03 PROCEDURE — 99214 OFFICE O/P EST MOD 30 MIN: CPT | Performed by: FAMILY MEDICINE

## 2025-04-03 PROCEDURE — G0439 PPPS, SUBSEQ VISIT: HCPCS | Performed by: FAMILY MEDICINE

## 2025-04-03 PROCEDURE — 3008F BODY MASS INDEX DOCD: CPT | Performed by: FAMILY MEDICINE

## 2025-04-03 PROCEDURE — 1160F RVW MEDS BY RX/DR IN RCRD: CPT | Performed by: FAMILY MEDICINE

## 2025-04-03 PROCEDURE — 3075F SYST BP GE 130 - 139MM HG: CPT | Performed by: FAMILY MEDICINE

## 2025-04-03 PROCEDURE — 3078F DIAST BP <80 MM HG: CPT | Performed by: FAMILY MEDICINE

## 2025-04-03 RX ORDER — PANTOPRAZOLE SODIUM 40 MG/1
40 TABLET, DELAYED RELEASE ORAL
Qty: 90 TABLET | Refills: 0 | Status: SHIPPED | OUTPATIENT
Start: 2025-04-03

## 2025-04-03 RX ORDER — TAMSULOSIN HYDROCHLORIDE 0.4 MG/1
0.4 CAPSULE ORAL DAILY
Qty: 90 CAPSULE | Refills: 0 | Status: SHIPPED | OUTPATIENT
Start: 2025-04-03

## 2025-04-03 RX ORDER — FUROSEMIDE 20 MG/1
20 TABLET ORAL DAILY
Qty: 90 TABLET | Refills: 0 | Status: SHIPPED | OUTPATIENT
Start: 2025-04-03

## 2025-04-03 RX ORDER — FERROUS SULFATE 325(65) MG
1 TABLET ORAL
Qty: 90 TABLET | Refills: 0 | Status: CANCELLED | OUTPATIENT
Start: 2025-04-03

## 2025-04-03 ASSESSMENT — ACTIVITIES OF DAILY LIVING (ADL)
DOING_HOUSEWORK: INDEPENDENT
GROCERY_SHOPPING: NEEDS ASSISTANCE
TAKING_MEDICATION: NEEDS ASSISTANCE
MANAGING_FINANCES: NEEDS ASSISTANCE
DRESSING: INDEPENDENT
BATHING: INDEPENDENT

## 2025-04-03 ASSESSMENT — ENCOUNTER SYMPTOMS
OCCASIONAL FEELINGS OF UNSTEADINESS: 0
DEPRESSION: 0
LOSS OF SENSATION IN FEET: 0

## 2025-04-03 NOTE — PROGRESS NOTES
Subjective   Reason for Visit: Gold Summers is an 69 y.o. male here for a Follow up and a Medicare Wellness visit.     Past Medical, Surgical, and Family History reviewed and updated in chart.         HPI      No recent BW  Colon: 07/23/2021  UDS/CSA: 07/02/2024  AWV: 12/19/2023      Patient has hypertension.  He does monitor readings at home.   BP typically 130s/70-80s.  He denies CP, SOB at this time.  He is taking Metoprolol.      He has hyperlipidemia.  He tries to limit the amount of fatty foods and high cholesterol foods that he consumes in his diet.  Patient states that he has been compliant with the dosing of his statin therapy.     Pt has CAD.  Presently denies any chest pain shortness of breath.  9/16/2022: Underwent off-pump coronary bypass with R bilateral pulmonary vein isolation LENCHO to LAD, and LIMA to OM, left atrial appendage ligation with a 45 mm clip by Dr. Sawyer Ibrahim      He had atrial fibrillation.  Pt has maintained sinus rhythm after surgery.  He was not restarted on anticoagulation after left appendage ligation 9/2022.  Antiarrhythmic therapy: Metoprolol succinate 50 mg daily  Antiplatelet therapy: Aspirin 81 mg daily, clopidogrel 75 mg daily  Anticoagulation therapy: none     He has a seizure disorder.  He has not had a seizure for years and has been maintained on phenobarbital without noted side effects.     He has vitamin D deficiency.  Patient states that he is compliant with his vitamin D supplementation.     He unfortunately continues to smoke despite my advice and encouragement.      Pt has carotid artery stenosis.  Patient underwent right CEA 1/27/2021 with Dr. Taylor.     Pt has COPD.  Pt was previously treated with Advair but he discontinued it when he did not note any improvement in his breathing.  He feels his breathing is at baseline and he denies any exacerbations since his last visit.     Pt has anemia.   He was previously referred to GI and hematology for poor iron  absorption  Was treated with IV iron in the past.  Currently on oral iron.          OARRS:  Miguel Garcia,  on 4/3/2025 11:26 AM  I have personally reviewed the OARRS report for Gold Summers. I have considered the risks of abuse, dependence, addiction and diversion and I believe that it is clinically appropriate for Gold Summers to be prescribed this medication    Is the patient prescribed a combination of a benzodiazepine and opioid?  No    Last Urine Drug Screen / ordered today: No  Recent Results (from the past 8760 hours)   Confirmation Opiate/Opioid/Benzo Prescription Compliance    Collection Time: 07/02/24 10:43 AM   Result Value Ref Range    Clonazepam <25 <25 ng/mL    7-Aminoclonazepam <25 <25 ng/mL    Alprazolam <25 <25 ng/mL    Alpha-Hydroxyalprazolam <25 <25 ng/mL    Midazolam <25 <25 ng/mL    Alpha-Hydroxymidazolam <25 <25 ng/mL    Chlordiazepoxide <25 <25 ng/mL    Diazepam <25 <25 ng/mL    Nordiazepam <25 <25 ng/mL    Temazepam <25 <25 ng/mL    Oxazepam <25 <25 ng/mL    Lorazepam <25 <25 ng/mL    Methadone <25 <25 ng/mL    EDDP <25 <25 ng/mL    6-Acetylmorphine <25 <25 ng/mL    Codeine <50 <50 ng/mL    Hydrocodone <25 <25 ng/mL    Hydromorphone <25 <25 ng/mL    Morphine  <50 <50 ng/mL    Norhydrocodone <25 <25 ng/mL    Noroxycodone <25 <25 ng/mL    Oxycodone <25 <25 ng/mL    Oxymorphone <25 <25 ng/mL    Fentanyl <2.5 <2.5 ng/mL    Norfentanyl <2.5 <2.5 ng/mL    Tramadol <50 <50 ng/mL    O-Desmethyltramadol <50 <50 ng/mL    Zolpidem <25 <25 ng/mL    Zolpidem Metabolite (ZCA) <25 <25 ng/mL   Screen Opiate/Opioid/Benzo Prescription Compliance    Collection Time: 07/02/24 10:43 AM   Result Value Ref Range    Creatinine, Urine Random 212.1 20.0 - 370.0 mg/dL    Amphetamine Screen, Urine Presumptive Negative Presumptive Negative    Barbiturate Screen, Urine Presumptive Negative Presumptive Negative    Cannabinoid Screen, Urine Presumptive Negative Presumptive Negative    Cocaine Metabolite Screen, Urine  Presumptive Negative Presumptive Negative    PCP Screen, Urine Presumptive Negative Presumptive Negative     Results are as expected.         Controlled Substance Agreement:  Date of the Last Agreement: 7/2/2024  Reviewed Controlled Substance Agreement including but not limited to the benefits, risks, and alternatives to treatment with a Controlled Substance medication(s).    Anticonvulsant:   What is the patient's goal of therapy? Prevent seizures  Is this being achieved with current treatment? yes    Activities of Daily Living:   Is your overall impression that this patient is benefiting (symptom reduction outweighs side effects) from Anticonvulsant therapy? Yes     1. Physical Functioning: Same  2. Family Relationship: Same  3. Social Relationship: Same  4. Mood: Same  5. Sleep Patterns: Same  6. Overall Function: Same      Patient Self Assessment of Health Status  Patient Self Assessment: Good    Nutrition and Exercise  Current Diet: Unhealthy Diet  Adequate Fluid Intake: Yes  Caffeine: Yes  Exercise Frequency: Regularly    Functional Ability/Level of Safety  Cognitive Impairment Observed: No cognitive impairment observed  Cognitive Impairment Reported: No cognitive impairment reported by patient or family    Home Safety Risk Factors: None    Patient Care Team:  Miguel Garcia DO as PCP - General  Miguel Garcia DO as PCP - [facility] ACO Attributed Provider  Jamal Nguyen MD as Consulting Physician (Cardiology)       Review of Systems  Constitutional: Patient denies any fever, chills, loss of appetite, or unexplained weight loss.  HEENT: Denies any headache, sore throat, eye pain, ear pain, decreased vision, or decreased hearing. Patient also denies any rhinorrhea.  Cardiovascular: Patient denies any chest pain, shortness of breath with exertion, tachycardia, palpitations, orthopnea, or paroxysmal nocturnal dyspnea.  Respiratory: Patient denies any cough, shortness of breath, or wheezing.  Gastrointestinal: Patient  "denies any nausea, vomiting, diarrhea, constipation, melena, hematochezia, or reflux symptoms  Musculoskeletal: Patient denies any myalgia, arthralgia, joint swelling, or joint deformity  Skin: Denies any rashes or skin lesions  Neurology: Patient denies any new motor or sensory losses. Denies any numbness, tingling, weakness, and incoordination of the extremities. Patient also denies any tremor, seizures, or gait instability.  Endocrinology: Denies any polyuria, polydipsia, polyphagia, or heat/cold intolerance.  Psychiatric: Denies any anxiety, depression, or suicidal/homicidal ideation.  Hematology: Patient denies any abnormal bruising or bleeding.      Objective   Vitals:  /64 (BP Location: Left arm, Patient Position: Sitting, BP Cuff Size: Small adult)   Pulse 54   Temp 36.5 °C (97.7 °F) (Temporal)   Ht 1.651 m (5' 5\")   Wt 63.7 kg (140 lb 8 oz)   SpO2 97%   BMI 23.38 kg/m²       Physical Exam  Gen. Appearance: Alert and cooperative, no acute distress, well-developed/well-nourished male.    Head: Normocephalic and atraumatic  EENT: Pupils are equal round reactive to light, extraocular muscles are intact, mucous membranes are moist, external auditory canals and tympanic membranes are within normal limits bilaterally, pharynx is without erythema or exudate, there is no noted rhinorrhea.  Neck: Supple and without adenopathy, no JVD at 90° and no carotid bruits are noted. There is no thyromegaly, thyroid tenderness, or palpable thyroid nodules.  Cardiovascular: Regular rate and rhythm without murmur or ectopy.  Respiratory: Clear to auscultation bilaterally with good air exchange.  Abdomen: Soft, nontender/nondistended. No masses, guarding, rebound, or rigidity. No hepatosplenomegaly, abdominal bruits, or CVA tenderness. Bowel sounds are normal. There is no widening of the aortic pulsation.  Musculoskeletal: Patient has good range of motion of the shoulders, elbows, wrists, hips, knees. There are no " noted joint effusions or deformities.  Skin: Good turgor, moist, warm and without rashes or lesions.  Lymph nodes: No cervical, clavicular, or inguinal adenopathy.  Neurological exam: Alert and oriented ×3, no tremor, normal gait.  Psychiatric: Appropriate mood and affect, good insight and judgment, no delusions or thought disorders, no suicidal or homicidal ideation.  Extremities: No clubbing, cyanosis, or edema    Assessment/Plan     MEDICARE ANNUAL WELLNESS EXAM: Appropriate screenings for the patient's current age were discussed at length.  I encouraged a low-fat/low-cholesterol diet and routine exercise.      HTN:    Stable based on office readings.  Blood pressure appears adequately controlled and we will continue with the current antihypertensive therapy.     Hyperlipidemia:   Patient will continue with the current medication.  Dietary changes, exercise, and maintenance of healthy weight were discussed at length.  Lab Results   Component Value Date    CHOL 136 03/26/2024    CHOL 142 06/27/2023    CHOL 110 08/11/2022     Lab Results   Component Value Date    HDL 42.8 03/26/2024    HDL 46.1 06/27/2023    HDL 49.4 08/11/2022     Lab Results   Component Value Date    LDLCALC 72 03/26/2024     Lab Results   Component Value Date    TRIG 107 03/26/2024    TRIG 127 06/27/2023    TRIG 75 08/11/2022     Coronary artery disease:   Patient is without worrisome signs or symptoms for unstable angina.  Risk factor modification was discussed at length.  Dietary changes, exercise and maintenance of a healthy weight were discussed.  Patient underwent CABGX2 and left atrial appendage ligation during the 8/15/2022 CABG.  Should continue to follow with cardiology.     Paroxysmal atrial fibrillation:  Stable based on symptoms.  Patient underwent left atrial appendage ligation so does not need anticoagulation.  Will continue with the current treatment plan.     Iron deficiency anemia:  Previously treated with iron infusions and now  maintained on oral therapy.  He will continue with the oral iron and we will continue to monitor.  Encouraged to follow with rheumatology as well.      COPD:  Stable based on symptoms.  Smoking cessation again encouraged.  Currently untreated as he discontinued Advair on his own in the past.     BPH:    Sympotms better since restarting the tamsulosin at his 10/1/2024 appt.     Vitamin B12 deficiency:  B12 level was normal on last labs was normal.   Patient will continue to take over-the-counter vitamin B12 (1000 mcg) daily.   We will continue to monitor.  Vitamin B12   Date Value Ref Range Status   09/30/2024 491 211 - 911 pg/mL Final   03/26/2024 263 211 - 911 pg/mL Final      Vitamin D deficiency:  Stable based on labs.  Patient to continue on the current dose of vitamin D supplementation.  Lab Results   Component Value Date    VITD25 41 09/30/2024    VITD25 49 03/15/2022    VITD25 49 06/07/2021     Seizure disorder:  Stable based on symptoms.  Will continue the phenobarbital at the current dose.     Pulmonary hypertension:  Stable based on symptoms.  Patient should also continue to follow with cardiology.    History of GI bleed:  Stable based on symptoms.   Will refill his pantoprazole.     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  Continue to follow with ophthalmology.     Cigarette smoker:  Patient understands that continued smoking will increase the risks of lung disease, vascular disease, and multiple malignancies.   Patient has been encouraged to work on smoking cessation and available smoking cessation aids were discussed.   I discussed smoking history/status and determined patient meets criteria for lung cancer screening with low-dose CT scan.  By using shared decision making we determined the patient will benefit from a screening, including a discussion of benefits and harms of screening, follow-up diagnostic testing, over diagnosis, false positive rate, and total radiation exposure.  I  counseled the patient on the importance of adhering to annual lung cancer low-dose CT screening, the impact of comorbidities, and his or her ability or willingness to undergo diagnosis and treatment if abnormalities are discovered.   Patient was encouraged to continue to abstain from tobacco use.  I provided patient with an order for a low-dose CT lung cancer screening.        Prostate cancer screening:  Ordered annual PSA screening lab.     History of colon polyps:  Referred him to Dr. Wilson (GI) for a colonoscopy.           PSA due 3/26/2025  COLONOSCOPY DUE 7/2024  MCAW due 4/2026       Scribe Attestation  By signing my name below, I, Rufus Munoz   attest that this documentation has been prepared under the direction and in the presence of Miguel Garcia DO.       Orders Placed This Encounter   Procedures    CT lung screening low dose    Phenobarbital level    Prostate Specific Antigen, Screen    Comprehensive Metabolic Panel    Lipid Panel     Requested Prescriptions     Signed Prescriptions Disp Refills    pantoprazole (ProtoNix) 40 mg EC tablet 90 tablet 0     Sig: Take 1 tablet (40 mg) by mouth once daily in the morning. Take before meals.    tamsulosin (Flomax) 0.4 mg 24 hr capsule 90 capsule 0     Sig: Take 1 capsule (0.4 mg) by mouth once daily.    furosemide (Lasix) 20 mg tablet 90 tablet 0     Sig: Take 1 tablet (20 mg) by mouth once daily.

## 2025-04-03 NOTE — PATIENT INSTRUCTIONS
Follow up in 3 months with labs to be done PRIOR.    It was a pleasure to see you today. Thank you for choosing us for your health care needs.    If you have lab or other testing completed and have not been informed of results within one week, please call the office for your results.    If you haven't done so, consider signing up for [facility], the [facility] personal health record. Ask the staff how you can get started.

## 2025-04-21 VITALS
HEART RATE: 54 BPM | HEIGHT: 65 IN | DIASTOLIC BLOOD PRESSURE: 64 MMHG | OXYGEN SATURATION: 97 % | SYSTOLIC BLOOD PRESSURE: 136 MMHG | BODY MASS INDEX: 23.41 KG/M2 | TEMPERATURE: 97.7 F | WEIGHT: 140.5 LBS

## 2025-04-25 DIAGNOSIS — Z12.11 COLON CANCER SCREENING: ICD-10-CM

## 2025-04-25 RX ORDER — POLYETHYLENE GLYCOL 3350, SODIUM SULFATE, POTASSIUM CHLORIDE, MAGNESIUM SULFATE, AND SODIUM CHLORIDE FOR ORAL SOLUTION 178.7-7.3G
1 KIT ORAL SEE ADMIN INSTRUCTIONS
Qty: 1 EACH | Refills: 0 | Status: SHIPPED | OUTPATIENT
Start: 2025-04-25 | End: 2025-04-28

## 2025-04-28 RX ORDER — SODIUM, POTASSIUM,MAG SULFATES 17.5-3.13G
SOLUTION, RECONSTITUTED, ORAL ORAL
Qty: 354 ML | Refills: 0 | Status: SHIPPED | OUTPATIENT
Start: 2025-04-28

## 2025-05-30 ENCOUNTER — TELEPHONE (OUTPATIENT)
Dept: GASTROENTEROLOGY | Facility: CLINIC | Age: 70
End: 2025-05-30
Payer: MEDICARE

## 2025-05-30 NOTE — TELEPHONE ENCOUNTER
Spoke with patient in regards to stopping Plavix 7 days prior to upcoming procedure per request of Dr. Garcia. Patient expressed understanding, stating no concerns or questions at this time.

## 2025-06-05 DIAGNOSIS — I10 PRIMARY HYPERTENSION: ICD-10-CM

## 2025-06-05 RX ORDER — OLMESARTAN MEDOXOMIL 20 MG/1
20 TABLET ORAL DAILY
Qty: 90 TABLET | Refills: 0 | Status: SHIPPED | OUTPATIENT
Start: 2025-06-05

## 2025-06-10 DIAGNOSIS — I65.23 BILATERAL CAROTID ARTERY OCCLUSION: ICD-10-CM

## 2025-06-10 DIAGNOSIS — D50.9 IRON DEFICIENCY ANEMIA, UNSPECIFIED: ICD-10-CM

## 2025-06-10 RX ORDER — ATORVASTATIN CALCIUM 80 MG/1
80 TABLET, FILM COATED ORAL NIGHTLY
Qty: 90 TABLET | Refills: 0 | Status: SHIPPED | OUTPATIENT
Start: 2025-06-10

## 2025-06-10 RX ORDER — FERROUS SULFATE 325(65) MG
1 TABLET ORAL
Qty: 90 TABLET | Refills: 0 | Status: SHIPPED | OUTPATIENT
Start: 2025-06-10

## 2025-06-11 ENCOUNTER — HOSPITAL ENCOUNTER (OUTPATIENT)
Dept: GASTROENTEROLOGY | Facility: HOSPITAL | Age: 70
Discharge: HOME | End: 2025-06-11
Payer: MEDICARE

## 2025-06-11 ENCOUNTER — ANESTHESIA EVENT (OUTPATIENT)
Dept: GASTROENTEROLOGY | Facility: HOSPITAL | Age: 70
End: 2025-06-11
Payer: MEDICARE

## 2025-06-11 ENCOUNTER — ANESTHESIA (OUTPATIENT)
Dept: GASTROENTEROLOGY | Facility: HOSPITAL | Age: 70
End: 2025-06-11
Payer: MEDICARE

## 2025-06-11 VITALS
WEIGHT: 135 LBS | RESPIRATION RATE: 18 BRPM | HEIGHT: 65 IN | SYSTOLIC BLOOD PRESSURE: 152 MMHG | TEMPERATURE: 97.2 F | HEART RATE: 60 BPM | BODY MASS INDEX: 22.49 KG/M2 | DIASTOLIC BLOOD PRESSURE: 69 MMHG | OXYGEN SATURATION: 97 %

## 2025-06-11 DIAGNOSIS — Z86.0100 HISTORY OF COLON POLYPS: Primary | ICD-10-CM

## 2025-06-11 PROCEDURE — 3700000002 HC GENERAL ANESTHESIA TIME - EACH INCREMENTAL 1 MINUTE

## 2025-06-11 PROCEDURE — 45380 COLONOSCOPY AND BIOPSY: CPT | Performed by: INTERNAL MEDICINE

## 2025-06-11 PROCEDURE — 7100000010 HC PHASE TWO TIME - EACH INCREMENTAL 1 MINUTE

## 2025-06-11 PROCEDURE — A45385 PR COLONOSCOPY,REMV LESN,SNARE: Performed by: NURSE ANESTHETIST, CERTIFIED REGISTERED

## 2025-06-11 PROCEDURE — 3700000001 HC GENERAL ANESTHESIA TIME - INITIAL BASE CHARGE

## 2025-06-11 PROCEDURE — 2500000004 HC RX 250 GENERAL PHARMACY W/ HCPCS (ALT 636 FOR OP/ED)

## 2025-06-11 PROCEDURE — 45385 COLONOSCOPY W/LESION REMOVAL: CPT | Performed by: INTERNAL MEDICINE

## 2025-06-11 PROCEDURE — A45385 PR COLONOSCOPY,REMV LESN,SNARE: Performed by: STUDENT IN AN ORGANIZED HEALTH CARE EDUCATION/TRAINING PROGRAM

## 2025-06-11 PROCEDURE — 7100000009 HC PHASE TWO TIME - INITIAL BASE CHARGE

## 2025-06-11 PROCEDURE — 99100 ANES PT EXTEME AGE<1 YR&>70: CPT | Performed by: STUDENT IN AN ORGANIZED HEALTH CARE EDUCATION/TRAINING PROGRAM

## 2025-06-11 RX ORDER — LIDOCAINE HYDROCHLORIDE 20 MG/ML
INJECTION, SOLUTION EPIDURAL; INFILTRATION; INTRACAUDAL; PERINEURAL AS NEEDED
Status: DISCONTINUED | OUTPATIENT
Start: 2025-06-11 | End: 2025-06-11

## 2025-06-11 RX ORDER — PROPOFOL 10 MG/ML
INJECTION, EMULSION INTRAVENOUS AS NEEDED
Status: DISCONTINUED | OUTPATIENT
Start: 2025-06-11 | End: 2025-06-11

## 2025-06-11 RX ADMIN — PROPOFOL 20 MG: 10 INJECTION, EMULSION INTRAVENOUS at 11:40

## 2025-06-11 RX ADMIN — PROPOFOL 100 MCG/KG/MIN: 10 INJECTION, EMULSION INTRAVENOUS at 11:42

## 2025-06-11 RX ADMIN — PROPOFOL 10 MG: 10 INJECTION, EMULSION INTRAVENOUS at 11:46

## 2025-06-11 RX ADMIN — SODIUM CHLORIDE, SODIUM LACTATE, POTASSIUM CHLORIDE, AND CALCIUM CHLORIDE: .6; .31; .03; .02 INJECTION, SOLUTION INTRAVENOUS at 11:36

## 2025-06-11 RX ADMIN — LIDOCAINE HYDROCHLORIDE 20 MG: 20 INJECTION, SOLUTION EPIDURAL; INFILTRATION; INTRACAUDAL; PERINEURAL at 11:39

## 2025-06-11 RX ADMIN — PROPOFOL 40 MG: 10 INJECTION, EMULSION INTRAVENOUS at 11:39

## 2025-06-11 RX ADMIN — PROPOFOL 10 MG: 10 INJECTION, EMULSION INTRAVENOUS at 11:41

## 2025-06-11 SDOH — HEALTH STABILITY: MENTAL HEALTH: CURRENT SMOKER: 0

## 2025-06-11 ASSESSMENT — PAIN - FUNCTIONAL ASSESSMENT
PAIN_FUNCTIONAL_ASSESSMENT: 0-10

## 2025-06-11 ASSESSMENT — PAIN SCALES - GENERAL
PAINLEVEL_OUTOF10: 0 - NO PAIN
PAIN_LEVEL: 0

## 2025-06-11 ASSESSMENT — ENCOUNTER SYMPTOMS
ENDOCRINE NEGATIVE: 1
CONSTITUTIONAL NEGATIVE: 1
CARDIOVASCULAR NEGATIVE: 1
RESPIRATORY NEGATIVE: 1
NEUROLOGICAL NEGATIVE: 1

## 2025-06-11 ASSESSMENT — COLUMBIA-SUICIDE SEVERITY RATING SCALE - C-SSRS
6. HAVE YOU EVER DONE ANYTHING, STARTED TO DO ANYTHING, OR PREPARED TO DO ANYTHING TO END YOUR LIFE?: NO
1. IN THE PAST MONTH, HAVE YOU WISHED YOU WERE DEAD OR WISHED YOU COULD GO TO SLEEP AND NOT WAKE UP?: NO
2. HAVE YOU ACTUALLY HAD ANY THOUGHTS OF KILLING YOURSELF?: NO

## 2025-06-11 NOTE — H&P
"History Of Present Illness  Gold Summers is a 70 y.o. male presenting with h/o large colon polyps     Past Medical History  Medical History[1]  Surgical History  Surgical History[2]  Social History  He reports that he has been smoking cigarettes. He has a 20 pack-year smoking history. He has never used smokeless tobacco. He reports that he does not currently use alcohol. He reports that he does not use drugs.    Family History  Family History[3]     Allergies  Allergies[4]  Review of Systems   Constitutional: Negative.    Respiratory: Negative.     Cardiovascular: Negative.    Endocrine: Negative.    Genitourinary: Negative.    Skin: Negative.    Neurological: Negative.         Physical Exam  Constitutional:       Appearance: Normal appearance.   HENT:      Head: Normocephalic and atraumatic.   Cardiovascular:      Rate and Rhythm: Normal rate and regular rhythm.   Pulmonary:      Effort: Pulmonary effort is normal.      Breath sounds: Normal breath sounds.   Abdominal:      General: Abdomen is flat. Bowel sounds are normal.      Palpations: Abdomen is soft.      Tenderness: There is no abdominal tenderness.   Musculoskeletal:         General: Normal range of motion.   Skin:     General: Skin is warm.   Neurological:      General: No focal deficit present.      Mental Status: He is alert.          Last Recorded Vitals  Blood pressure 154/68, pulse 60, temperature 36.4 °C (97.5 °F), temperature source Temporal, resp. rate 16, height 1.651 m (5' 5\"), weight 61.2 kg (135 lb), SpO2 97%.    Assessment/Plan   H/o large colon polyps.   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    Peyronie disease    Long term (current) use of anticoagulants 09/22/2022    Anticoagulant long-term use    Other long term (current) drug therapy 11/08/2022    High risk medication use    " Personal history of other diseases of the circulatory system     History of atrial fibrillation    Personal history of other diseases of the digestive system 12/22/2022    History of gastrointestinal hemorrhage    Personal history of other endocrine, nutritional and metabolic disease     History of hypercholesterolemia    Proximal humerus fracture 02/04/2023    Tobacco use 11/08/2022    Tobacco abuse    Unspecified convulsions (Multi) 12/22/2022    Seizures   [2]   Past Surgical History:  Procedure Laterality Date    COLONOSCOPY  09/23/2015    Complete Colonoscopy    CT ANGIO NECK  12/7/2020    CT NECK ANGIO W AND WO IV CONTRAST 12/7/2020 [facility]    OTHER SURGICAL HISTORY  01/06/2020    Penile surgery    OTHER SURGICAL HISTORY  01/06/2020    Renal lithotripsy    OTHER SURGICAL HISTORY  03/22/2021    Cardiac catheterization    OTHER SURGICAL HISTORY  02/04/2021    Endarterectomy    OTHER SURGICAL HISTORY  11/03/2021    Carotid artery angioplasty    OTHER SURGICAL HISTORY  11/03/2021    Hip surgery   [3]   Family History  Problem Relation Name Age of Onset    Diabetes Mother      Heart attack Father      Heart attack Sibling      Coronary artery disease Other      Heart attack Other     [4]   Allergies  Allergen Reactions    Penicillins Other     Myalgia, vomiting

## 2025-06-11 NOTE — DISCHARGE INSTRUCTIONS
Patient Instructions after a Colonoscopy      The anesthetics, sedatives or narcotics which were given to you today will be acting in your body for the next 24 hours, so you might feel a little sleepy or groggy.  This feeling should slowly wear off. Carefully read and follow the instructions.     You received sedation today:  - Do not drive or operate any machinery or power tools of any kind.   - No alcoholic beverages today, not even beer or wine.  - Do not make any important decisions or sign any legal documents.  - No over the counter medications that contain alcohol or that may cause drowsiness.  - Do not make any important decisions or sign any legal documents.  - Make sure you have someone with you for first 24 hours.    While it is common to experience mild to moderate abdominal distention, gas, or belching after your procedure, if any of these symptoms occur following discharge from the GI Lab or within one week of having your procedure, call the [facility] to be advised whether a visit to your nearest Urgent Care or Emergency Department is indicated.  Take this paper with you if you go.     - If you develop an allergic reaction to the medications that were given during your procedure such as difficulty breathing, rash, hives, severe nausea, vomiting or lightheadedness.  - If you experience chest pain, shortness of breath, severe abdominal pain, fevers and chills.  -If you develop signs and symptoms of bleeding such as blood in your spit, if your stools turn black, tarry, or bloody  - If you have not urinated within 8 hours following your procedure.  - If your IV site becomes painful, red, inflamed, or looks infected.    If you received a biopsy/polypectomy/sphincterotomy the following instructions apply below:    __ Do not use Aspirin containing products, non-steroidal medications or anti-coagulants for one week following your procedure. (Examples of these types of medications are: Advil,  Arthrotec, Aleve, Coumadin, Ecotrin, Heparin, Ibuprofen, Indocin, Motrin, Naprosyn, Nuprin, Plavix, Vioxx, and Voltarin, or their generic forms.  This list is not all-inclusive.  Check with your physician or pharmacist before resuming medications.)   __ Eat a soft diet today.  Avoid foods that are poorly digested for the next 24 hours.  These foods would include: nuts, beans, lettuce, red meats, and fried foods. Start with liquids and advance your diet as tolerated, gradually work up to eating solids.   __ Do not have a Barium Study or Enema for one week.    Your physician recommends the additional following instructions:    -You have a contact number available for emergencies. The signs and symptoms of potential delayed complications were discussed with you. You may return to normal activities tomorrow.  -Resume your previous diet.  -Continue your present medications.   -We are waiting for your pathology results.  -Your physician has recommended a repeat colonoscopy (date to be determined after pending pathology results are reviewed) for surveillance based on pathology results.  -The findings and recommendations have been discussed with you.  -The findings and recommendations were discussed with your family.  - Please see Medication Reconciliation Form for new medication/medications prescribed.       If you experience any problems or have any questions following discharge from the GI Lab, please call:        Nurse Signature                                                                        Date___________________                                                                            Patient/Responsible Party Signature                                        Date___________________

## 2025-06-11 NOTE — ANESTHESIA POSTPROCEDURE EVALUATION
Patient: Gold Summers    Procedure Summary       Date: 06/11/25 Room / Location: [facility]    Anesthesia Start: 1135 Anesthesia Stop: 1205    Procedure: COLONOSCOPY Diagnosis:       History of colon polyps      Colon cancer screening    Scheduled Providers: Fransico Wilson MD Responsible Provider: El Cassidy DO    Anesthesia Type: MAC ASA Status: 3            Anesthesia Type: MAC    Vitals Value Taken Time   /60 06/11/25 12:02   Temp 36.4 °C (97.5 °F) 06/11/25 12:02   Pulse 59 06/11/25 12:02   Resp 18 06/11/25 12:02   SpO2 97 % 06/11/25 12:02       Anesthesia Post Evaluation    Patient location during evaluation: PACU  Patient participation: complete - patient participated  Level of consciousness: awake  Pain score: 0  Pain management: adequate  Multimodal analgesia pain management approach  Airway patency: patent  Two or more strategies used to mitigate risk of obstructive sleep apnea  Cardiovascular status: acceptable  Respiratory status: acceptable  Hydration status: acceptable  Postoperative Nausea and Vomiting: none        There were no known notable events for this encounter.

## 2025-06-11 NOTE — ANESTHESIA PREPROCEDURE EVALUATION
Patient: Gold Summers    Procedure Information       Anesthesia Start Date/Time: 25 1135    Scheduled providers: Fransico Wilson MD    Procedure: COLONOSCOPY    Location: [facility]            Relevant Problems   Cardiac   (+) CAD (coronary atherosclerotic disease)   (+) Coronary artery disease involving native coronary artery of native heart without angina pectoris   (+) Mixed hyperlipidemia   (+) PAF (paroxysmal atrial fibrillation) (Multi)   (+) Primary hypertension   (+) Rib pain   (+) Tricuspid insufficiency   (+) Ventricular tachycardia (paroxysmal)      Pulmonary   (+) COPD (chronic obstructive pulmonary disease) (Multi)   (+) Pulmonary hypertension (Multi)      Neuro   (+) Bilateral carotid artery stenosis   (+) Depressive disorder   (+) Seizure disorder (Multi)   (+) Stenosis of carotid artery   (+) TIA (transient ischemic attack)      GI   (+) Duodenal ulcer   (+) Gastrointestinal hemorrhage      /Renal   (+) BPH with obstruction/lower urinary tract symptoms   (+) Calcium nephrolithiasis      Liver   (+) Acute cholecystitis with chronic cholecystitis   (+) Cholelithiases   (+) Chronic cholecystitis with calculus      Hematology   (+) Acute blood loss anemia   (+) Anemia   (+) B12 deficiency anemia   (+) Iron deficiency anemia       Clinical information reviewed:   Tobacco  Allergies  Meds   Med Hx  Surg Hx   Fam Hx  Soc Hx        NPO Detail:  NPO/Void Status  Carbohydrate Drink Given Prior to Surgery? : N  Date of Last Liquid: 06/10/25  Time of Last Liquid: 0800  Date of Last Solid: 06/10/25  Time of Last Solid:   Last Intake Type: Clear fluids  Time of Last Void: 1057         Physical Exam    Airway  Mallampati: III  TM distance: >3 FB  Mouth openin finger widths     Cardiovascular - normal exam  Rhythm: regular  Rate: normal     Dental - normal exam     Pulmonary - normal exam   Abdominal - normal examAbdomen: soft           Anesthesia Plan    History of general  anesthesia?: yes  History of complications of general anesthesia?: no    ASA 3     MAC     The patient is not a current smoker.  Patient was previously instructed to abstain from smoking on day of procedure.  Patient did not smoke on day of procedure.  Education provided regarding risk of obstructive sleep apnea.  intravenous induction   Postoperative pain plan includes opioids.  Anesthetic plan and risks discussed with patient.  Use of blood products discussed with patient who.

## 2025-06-18 LAB
LABORATORY COMMENT REPORT: NORMAL
PATH REPORT.FINAL DX SPEC: NORMAL
PATH REPORT.GROSS SPEC: NORMAL
PATH REPORT.RELEVANT HX SPEC: NORMAL
PATH REPORT.TOTAL CANCER: NORMAL

## 2025-06-19 ENCOUNTER — ANCILLARY PROCEDURE (OUTPATIENT)
Facility: HOSPITAL | Age: 70
End: 2025-06-19
Payer: MEDICARE

## 2025-06-19 DIAGNOSIS — F17.210 CIGARETTE SMOKER: ICD-10-CM

## 2025-06-19 PROCEDURE — 71271 CT THORAX LUNG CANCER SCR C-: CPT | Performed by: RADIOLOGY

## 2025-06-19 PROCEDURE — 71271 CT THORAX LUNG CANCER SCR C-: CPT

## 2025-06-25 DIAGNOSIS — G40.909 SEIZURE DISORDER (MULTI): ICD-10-CM

## 2025-06-25 DIAGNOSIS — I25.10 ARTERIOSCLEROSIS OF CORONARY ARTERY: ICD-10-CM

## 2025-06-25 RX ORDER — PHENOBARBITAL 64.8 MG/1
64.8 TABLET ORAL 2 TIMES DAILY
Qty: 180 TABLET | Refills: 0 | Status: SHIPPED | OUTPATIENT
Start: 2025-06-25

## 2025-06-25 RX ORDER — CLOPIDOGREL BISULFATE 75 MG/1
75 TABLET ORAL DAILY
Qty: 90 TABLET | Refills: 0 | Status: SHIPPED | OUTPATIENT
Start: 2025-06-25

## 2025-06-26 DIAGNOSIS — I10 PRIMARY HYPERTENSION: ICD-10-CM

## 2025-06-26 RX ORDER — METOPROLOL SUCCINATE 50 MG/1
50 TABLET, EXTENDED RELEASE ORAL DAILY
Qty: 90 TABLET | Refills: 0 | Status: SHIPPED | OUTPATIENT
Start: 2025-06-26

## 2025-06-27 LAB
ALBUMIN SERPL-MCNC: 4 G/DL (ref 3.6–5.1)
ALP SERPL-CCNC: 130 U/L (ref 35–144)
ALT SERPL-CCNC: 16 U/L (ref 9–46)
ANION GAP SERPL CALCULATED.4IONS-SCNC: 8 MMOL/L (CALC) (ref 7–17)
AST SERPL-CCNC: 19 U/L (ref 10–35)
BILIRUB SERPL-MCNC: 0.5 MG/DL (ref 0.2–1.2)
BUN SERPL-MCNC: 14 MG/DL (ref 7–25)
CALCIUM SERPL-MCNC: 8.9 MG/DL (ref 8.6–10.3)
CHLORIDE SERPL-SCNC: 95 MMOL/L (ref 98–110)
CHOLEST SERPL-MCNC: 115 MG/DL
CHOLEST/HDLC SERPL: 2.7 (CALC)
CO2 SERPL-SCNC: 28 MMOL/L (ref 20–32)
CREAT SERPL-MCNC: 1.18 MG/DL (ref 0.7–1.28)
EGFRCR SERPLBLD CKD-EPI 2021: 66 ML/MIN/1.73M2
GLUCOSE SERPL-MCNC: 81 MG/DL (ref 65–99)
HDLC SERPL-MCNC: 42 MG/DL
LDLC SERPL CALC-MCNC: 57 MG/DL (CALC)
NONHDLC SERPL-MCNC: 73 MG/DL (CALC)
PHENOBARB SERPL-MCNC: 26.4 MG/L (ref 15–40)
POTASSIUM SERPL-SCNC: 5 MMOL/L (ref 3.5–5.3)
PROT SERPL-MCNC: 6 G/DL (ref 6.1–8.1)
PSA SERPL-MCNC: 2.69 NG/ML
SODIUM SERPL-SCNC: 131 MMOL/L (ref 135–146)
TRIGL SERPL-MCNC: 75 MG/DL

## 2025-06-29 DIAGNOSIS — Z87.19 HISTORY OF GI BLEED: ICD-10-CM

## 2025-06-29 DIAGNOSIS — I10 PRIMARY HYPERTENSION: ICD-10-CM

## 2025-06-29 NOTE — RESULT ENCOUNTER NOTE
During recent colonoscopy multiple polyps were seen and completely removed.  Pathology results show these polyps as tubular adenoma.  This kind of polyp is benign but precancerous.  Based on pathology results I recommend repeating colonoscopy in 5 years.  Do not hesitate to contact me if you have any questions or concerns..  Sincerely,

## 2025-06-30 RX ORDER — PANTOPRAZOLE SODIUM 40 MG/1
40 TABLET, DELAYED RELEASE ORAL
Qty: 90 TABLET | Refills: 0 | OUTPATIENT
Start: 2025-06-30

## 2025-06-30 RX ORDER — FUROSEMIDE 20 MG/1
20 TABLET ORAL DAILY
Qty: 90 TABLET | Refills: 0 | OUTPATIENT
Start: 2025-06-30

## 2025-07-01 DIAGNOSIS — Z87.19 HISTORY OF GI BLEED: ICD-10-CM

## 2025-07-01 DIAGNOSIS — I10 PRIMARY HYPERTENSION: ICD-10-CM

## 2025-07-02 RX ORDER — FUROSEMIDE 20 MG/1
20 TABLET ORAL DAILY
Qty: 90 TABLET | Refills: 0 | Status: SHIPPED | OUTPATIENT
Start: 2025-07-02

## 2025-07-02 RX ORDER — PANTOPRAZOLE SODIUM 40 MG/1
40 TABLET, DELAYED RELEASE ORAL
Qty: 90 TABLET | Refills: 0 | Status: SHIPPED | OUTPATIENT
Start: 2025-07-02

## 2025-07-03 ENCOUNTER — APPOINTMENT (OUTPATIENT)
Dept: PRIMARY CARE | Facility: CLINIC | Age: 70
End: 2025-07-03
Payer: MEDICARE

## 2025-07-03 VITALS
HEART RATE: 54 BPM | DIASTOLIC BLOOD PRESSURE: 60 MMHG | HEIGHT: 65 IN | BODY MASS INDEX: 22.81 KG/M2 | SYSTOLIC BLOOD PRESSURE: 132 MMHG | TEMPERATURE: 97.7 F | OXYGEN SATURATION: 97 % | WEIGHT: 136.9 LBS

## 2025-07-03 DIAGNOSIS — Z86.0100 HISTORY OF COLON POLYPS: ICD-10-CM

## 2025-07-03 DIAGNOSIS — J31.0 CHRONIC RHINITIS: ICD-10-CM

## 2025-07-03 DIAGNOSIS — H35.3221 EXUDATIVE AGE-RELATED MACULAR DEGENERATION, LEFT EYE, WITH ACTIVE CHOROIDAL NEOVASCULARIZATION: ICD-10-CM

## 2025-07-03 DIAGNOSIS — F17.210 CIGARETTE SMOKER: ICD-10-CM

## 2025-07-03 DIAGNOSIS — I10 PRIMARY HYPERTENSION: ICD-10-CM

## 2025-07-03 DIAGNOSIS — E78.2 MIXED HYPERLIPIDEMIA: ICD-10-CM

## 2025-07-03 DIAGNOSIS — G40.909 SEIZURE DISORDER (MULTI): ICD-10-CM

## 2025-07-03 DIAGNOSIS — R22.0 FACIAL SWELLING: ICD-10-CM

## 2025-07-03 DIAGNOSIS — I48.0 PAF (PAROXYSMAL ATRIAL FIBRILLATION) (MULTI): ICD-10-CM

## 2025-07-03 DIAGNOSIS — J44.9 CHRONIC OBSTRUCTIVE PULMONARY DISEASE, UNSPECIFIED COPD TYPE (MULTI): ICD-10-CM

## 2025-07-03 DIAGNOSIS — E53.8 VITAMIN B 12 DEFICIENCY: ICD-10-CM

## 2025-07-03 DIAGNOSIS — I25.10 CORONARY ARTERY DISEASE INVOLVING NATIVE CORONARY ARTERY OF NATIVE HEART WITHOUT ANGINA PECTORIS: ICD-10-CM

## 2025-07-03 DIAGNOSIS — D50.9 IRON DEFICIENCY ANEMIA, UNSPECIFIED IRON DEFICIENCY ANEMIA TYPE: ICD-10-CM

## 2025-07-03 DIAGNOSIS — N13.8 BPH WITH OBSTRUCTION/LOWER URINARY TRACT SYMPTOMS: ICD-10-CM

## 2025-07-03 DIAGNOSIS — N40.1 BPH WITH OBSTRUCTION/LOWER URINARY TRACT SYMPTOMS: ICD-10-CM

## 2025-07-03 DIAGNOSIS — Z12.5 PROSTATE CANCER SCREENING: ICD-10-CM

## 2025-07-03 DIAGNOSIS — E55.9 VITAMIN D DEFICIENCY: ICD-10-CM

## 2025-07-03 DIAGNOSIS — I27.20 PULMONARY HYPERTENSION (MULTI): ICD-10-CM

## 2025-07-03 DIAGNOSIS — Z87.19 HISTORY OF GI BLEED: ICD-10-CM

## 2025-07-03 DIAGNOSIS — I10 PRIMARY HYPERTENSION: Primary | ICD-10-CM

## 2025-07-03 DIAGNOSIS — Z79.899 HIGH RISK MEDICATION USE: ICD-10-CM

## 2025-07-03 PROCEDURE — 99214 OFFICE O/P EST MOD 30 MIN: CPT | Performed by: FAMILY MEDICINE

## 2025-07-03 PROCEDURE — 1160F RVW MEDS BY RX/DR IN RCRD: CPT | Performed by: FAMILY MEDICINE

## 2025-07-03 PROCEDURE — 3078F DIAST BP <80 MM HG: CPT | Performed by: FAMILY MEDICINE

## 2025-07-03 PROCEDURE — 3075F SYST BP GE 130 - 139MM HG: CPT | Performed by: FAMILY MEDICINE

## 2025-07-03 PROCEDURE — 3008F BODY MASS INDEX DOCD: CPT | Performed by: FAMILY MEDICINE

## 2025-07-03 PROCEDURE — G2211 COMPLEX E/M VISIT ADD ON: HCPCS | Performed by: FAMILY MEDICINE

## 2025-07-03 PROCEDURE — 1159F MED LIST DOCD IN RCRD: CPT | Performed by: FAMILY MEDICINE

## 2025-07-03 RX ORDER — TAMSULOSIN HYDROCHLORIDE 0.4 MG/1
0.4 CAPSULE ORAL DAILY
Qty: 90 CAPSULE | Refills: 0 | Status: SHIPPED | OUTPATIENT
Start: 2025-07-03

## 2025-07-03 NOTE — PATIENT INSTRUCTIONS
Follow up in 3 months with labs to be done PRIOR.    It was a pleasure to see you today. Thank you for choosing us for your health care needs.    If you have lab or other testing completed and have not been informed of results within one week, please call the office for your results.    If you haven't done so, consider signing up for [facility] [employer], the [facility] personal health record. Ask the staff how you can get started.

## 2025-07-03 NOTE — PROGRESS NOTES
Subjective   Patient ID: Gold Summers is a 70 y.o. male who presents for Follow-up.    HPI     No new concerns     Complains of drooling at night.   Feels he has swelling in his bilateral cheeks (parotid region).    Complains of constant nasal drainage.       Review labs: 06/26/2025- includes PSA   Colonoscopy: 06/11/2025      Patient has hypertension.  He does monitor readings at home.   BP typically 130s/70-80s.  He denies CP, SOB at this time.  He is taking metoprolol.      He has hyperlipidemia.  He tries to limit the amount of fatty foods and high cholesterol foods that he consumes in his diet.  Patient states that he has been compliant with the dosing of his statin therapy.     Pt has CAD.  Presently denies any chest pain shortness of breath.  9/16/2022: Underwent off-pump coronary bypass with R bilateral pulmonary vein isolation LENCHO to LAD, and LIMA to OM, left atrial appendage ligation with a 45 mm clip by Dr. Sawyer Ibrahim      He had atrial fibrillation.  Pt has maintained sinus rhythm after surgery.  He was not restarted on anticoagulation after left appendage ligation 9/2022.  Antiarrhythmic therapy: Metoprolol succinate 50 mg daily  Antiplatelet therapy: Aspirin 81 mg daily, clopidogrel 75 mg daily  Anticoagulation therapy: none     He has a seizure disorder.  He has not had a seizure for years and has been maintained on phenobarbital without noted side effects.     He has vitamin D deficiency.  Patient states that he is compliant with his vitamin D supplementation.     He unfortunately continues to smoke despite my advice and encouragement.      Pt has carotid artery stenosis.  Patient underwent right CEA 1/27/2021 with Dr. Taylor.     Pt has COPD.  Pt was previously treated with Advair but he discontinued it when he did not note any improvement in his breathing.  He feels his breathing is at baseline and he denies any exacerbations since his last visit.     Pt has anemia.   He was previously referred  to GI and hematology for poor iron absorption  Was treated with IV iron in the past.  Currently on oral iron.    Had colonoscopy done 6/11/2025.  Several tubular adenomas found.  To repeat colonoscopy 6/2030.               OARRS:  Miguel Garcia DO on 7/3/2025 11:25 AM  I have personally reviewed the OARRS report for Gold Summers. I have considered the risks of abuse, dependence, addiction and diversion and I believe that it is clinically appropriate for Gold Summers to be prescribed this medication    Is the patient prescribed a combination of a benzodiazepine and opioid?  No    Last Urine Drug Screen / ordered today: Yes  No results found for this or any previous visit (from the past 8760 hours).  N/A    Controlled Substance Agreement:  Date of the Last Agreement: 74/3/2025  Reviewed Controlled Substance Agreement including but not limited to the benefits, risks, and alternatives to treatment with a Controlled Substance medication(s).    Anticonvulsant:   What is the patient's goal of therapy? Prevent seizures  Is this being achieved with current treatment? yes    Activities of Daily Living:   Is your overall impression that this patient is benefiting (symptom reduction outweighs side effects) from Anticonvulsant therapy? Yes     1. Physical Functioning: Same  2. Family Relationship: Same  3. Social Relationship: Same  4. Mood: Same  5. Sleep Patterns: Same  6. Overall Function: Same      Review of Systems  Constitutional: Patient denies any fever, chills, loss of appetite, or unexplained weight loss.  Cardiovascular: Patient denies any chest pain, shortness of breath with exertion, tachycardia, palpitations, orthopnea, or paroxysmal nocturnal dyspnea.  Respiratory: Patient denies any cough, shortness of breath, or wheezing.  Gastrointestinal: Patient denies any nausea, vomiting, diarrhea, constipation, melena, hematochezia, or reflux symptoms  Skin: Denies any rashes or skin lesions  Neurology: Patient denies  "any new motor or sensory losses. Denies any numbness, tingling, weakness, and incoordination of the extremities. Patient also denies any tremor, seizures, or gait instability.  Endocrinology: Denies any polyuria, polydipsia, polyphagia, or heat/cold intolerance.  Psychiatric: Patient denies any depression, anxiety or suicidal/homicidal ideation.    ENT: Drooling at night. Has occasional swelling of the bilateral cheeks.   Has nasal drainage.      Objective   /60 (BP Location: Right arm, Patient Position: Sitting, BP Cuff Size: Adult)   Pulse 54   Temp 36.5 °C (97.7 °F) (Temporal)   Ht 1.651 m (5' 5\")   Wt 62.1 kg (136 lb 14.4 oz)   SpO2 97%   BMI 22.78 kg/m²     Physical Exam  General Appearance: Alert and cooperative, in no acute distress, well-developed/well-nourished male.  Neck: Supple and without adenopathy or rigidity. There is no JVD at 90° and no carotid bruits are noted. There is no thyromegaly, thyroid tenderness, or palpable thyroid nodules.  Heart: Regular rate and rhythm without murmur or ectopy.  Lungs: Clear to auscultation bilaterally with good air exchange.  Skin: Good turgor, moist, warm and without rashes or lesions.  Neurological exam: Alert and oriented ×3, no tremor, normal gait.    Extremities: No clubbing, cyanosis.  Trace pretibial edema on the left with noted anterior varicose veins which are nontender.    Psychiatric: Appropriate mood and affect, good insight and judgment, no delusions or thought disorders, no suicidal or homicidal ideation    Head: no swelling noted on exam. Parotid glands do not appear enlarged or tender. Occasional saliva leaking from the right side of the mouth.      Assessment/Plan     HTN:    Blood pressure appears adequately controlled and we will continue with the current antihypertensive therapy.    Hyperlipidemia:   Patient will continue with the current medication.  Dietary changes, exercise, and maintenance of healthy weight were discussed at " "length.  Lab Results   Component Value Date    CHOL 115 06/26/2025    CHOL 136 03/26/2024    CHOL 142 06/27/2023     Lab Results   Component Value Date    HDL 42 06/26/2025    HDL 42.8 03/26/2024    HDL 46.1 06/27/2023     Lab Results   Component Value Date    LDLCALC 57 06/26/2025    LDLCALC 72 03/26/2024     Lab Results   Component Value Date    TRIG 75 06/26/2025    TRIG 107 03/26/2024    TRIG 127 06/27/2023     No components found for: \"CHOLHDL\"      Coronary artery disease:   Patient is without worrisome signs or symptoms for unstable angina.  Risk factor modification was discussed at length.  Dietary changes, exercise and maintenance of a healthy weight were discussed.    Paroxysmal atrial fibrillation:  Stable based on current symptoms.  Patient previously underwent a left atrial appendage ligation and thus no longer requires anticoagulation.  Continue current treatment plan.    Iron deficiency anemia:  Previously underwent iron infusions and has been maintained on oral therapy.    COPD:  Stable based on current symptoms.  Smoking cessation once again encouraged.  He discontinued Advair in the past and has declined any as he believes he does not have any symptoms.    BPH:  Stable on tamsulosin.  Nighttime urination is less than 3 times per night.  With the current treatment plan.    Vitamin B12 deficiency:  Lab Results   Component Value Date    WJSRZKOE98 491 09/30/2024    WDZQTYKB07 263 03/26/2024    KECNPISP49 550 02/13/2023   B12 levels were normal on his most recent labs.  Will continue with over-the-counter oral supplementation.  Will continue to monitor.    Vitamin D deficiency:  Lab Results   Component Value Date    VITD25 41 09/30/2024    VITD25 49 03/15/2022    VITD25 49 06/07/2021   Stable based on most recent labs.  Continue the current dose of vitamin D supplementation.    Seizure disorder:  Has not experienced any recurrent symptoms.  Continues to tolerate phenobarbital well.  We will continue " with the same.    Pulmonary hypertension:  Stable based on symptoms.  Patient should also continue to follow with cardiology.     History of GI bleed:  Stable based on symptoms.   Continue PPI therapy.     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  Continue to follow with ophthalmology.     Cigarette smoker:  Patient understands that continued smoking will increase the risks of lung disease, vascular disease, and multiple malignancies.   Patient has been encouraged to work on smoking cessation and available smoking cessation aids were declined.    History of colon polyps:  Next colonoscopy due 6/2030     High risk medication use:  Urine drug screen and controlled substance agreement completed.    Facial swelling / Chronic rhinitis:  Referred him to ENT for further evaluation.        Recommended compression stockings to reduce swelling in the lower legs.       PSA due 6/2026  COLONOSCOPY DUE 6/2030 (tubular adenomas)  MCAW due 4/2026         Scribe Attestation  By signing my name below, [employer]ago Scribe   attest that this documentation has been prepared under the direction and in the presence of Miguel Garcia DO.    Orders Placed This Encounter   Procedures    Drug Screen, Urine With Reflex to Confirmation    Referral to ENT     Requested Prescriptions     Signed Prescriptions Disp Refills    tamsulosin (Flomax) 0.4 mg 24 hr capsule 90 capsule 0     Sig: Take 1 capsule (0.4 mg) by mouth once daily.       COLONOSCOPY DUE 6/10/2030

## 2025-07-07 LAB
AMOBARBITAL UR-MCNC: NEGATIVE NG/ML
AMPHETAMINES UR QL: NEGATIVE NG/ML
BARBITURATES UR QL: POSITIVE NG/ML
BENZODIAZ UR QL: NEGATIVE NG/ML
BUTALBITAL UR-MCNC: NEGATIVE NG/ML
BZE UR QL: NEGATIVE NG/ML
CREAT UR-MCNC: 52.7 MG/DL
DRUG SCREEN COMMENT UR-IMP: ABNORMAL
DRUG SCREEN COMMENT UR-IMP: ABNORMAL
FENTANYL UR QL SCN: NEGATIVE NG/ML
METHADONE UR QL: NEGATIVE NG/ML
OPIATES UR QL: NEGATIVE NG/ML
OXIDANTS UR QL: NEGATIVE MCG/ML
OXYCODONE UR QL: NEGATIVE NG/ML
PCP UR QL: NEGATIVE NG/ML
PENTOBARB UR-MCNC: NEGATIVE NG/ML
PH UR: 5.1 [PH] (ref 4.5–9)
PHENOBARB UR-MCNC: >5000 NG/ML
QUEST NOTES AND COMMENTS: ABNORMAL
SECOBARBITAL UR-MCNC: NEGATIVE NG/ML
THC UR QL: POSITIVE NG/ML
THC UR-MCNC: 8 NG/ML

## 2025-07-18 ENCOUNTER — APPOINTMENT (OUTPATIENT)
Dept: OTOLARYNGOLOGY | Facility: CLINIC | Age: 70
End: 2025-07-18
Payer: MEDICARE

## 2025-07-18 DIAGNOSIS — T17.308A CHOKING, INITIAL ENCOUNTER: ICD-10-CM

## 2025-07-18 DIAGNOSIS — K11.7 EXCESSIVE SALIVATION: ICD-10-CM

## 2025-07-18 DIAGNOSIS — R22.0 FACIAL SWELLING: Primary | ICD-10-CM

## 2025-07-18 DIAGNOSIS — R05.9 COUGH, UNSPECIFIED TYPE: ICD-10-CM

## 2025-07-18 PROCEDURE — 99203 OFFICE O/P NEW LOW 30 MIN: CPT | Performed by: OTOLARYNGOLOGY

## 2025-07-18 PROCEDURE — 1159F MED LIST DOCD IN RCRD: CPT | Performed by: OTOLARYNGOLOGY

## 2025-07-18 PROCEDURE — 1160F RVW MEDS BY RX/DR IN RCRD: CPT | Performed by: OTOLARYNGOLOGY

## 2025-07-18 PROCEDURE — 31575 DIAGNOSTIC LARYNGOSCOPY: CPT | Performed by: OTOLARYNGOLOGY

## 2025-07-18 ASSESSMENT — ENCOUNTER SYMPTOMS
RESPIRATORY NEGATIVE: 1
NEUROLOGICAL NEGATIVE: 1
CONSTITUTIONAL NEGATIVE: 1
CARDIOVASCULAR NEGATIVE: 1

## 2025-07-18 NOTE — PROGRESS NOTES
Subjective   Patient ID: Gold Summers is a 70 y.o. male who presents for Facial Swelling and CHRONIC RHINITIS.    HPI  Patient seen today for evaluation of excessive oral and nasal drainage.  He is on about 11 different medications for various reasons including seizure disorder which some of these may have an impact on the overproduction of drainage.  He has been noted to have some swelling in the right facial region noted by his family and they want that checked to see if there is any concern for mass or tumor etc. here.  He denies any pain.  There has been some evidence of cough phenomenon likely with throat clearing and occasional choking and difficulty with swallowing some of the secretions as it pertains to the drainage issues.  There have been no other worrisome issues From the [facility].    Review of Systems   Constitutional: Negative.    HENT: Negative.     Respiratory: Negative.     Cardiovascular: Negative.    Neurological: Negative.          Physical Exam    General appearance: No acute distress. Normal facies. Symmetric facial movement. No gross lesions of the face are noted.  There is evidence of some slight facial fullness on the right-hand side possibly a lipoma or just versus asymmetric fullness.  There is no true intrinsic mass or tumor.  There is no lymphadenopathy.  The external ear structures appear normal. The ear canals patent and the tympanic membranes are intact without evidence of air-fluid levels, retraction, or congenital defects.  Nasal exam is clear anteriorly. The septum is relatively straight and the nasal mucosa is grossly unremarkable without evidence of any worrisome infection or polyp or mass. The oral cavity and oropharynx are unremarkable. There is no evidence of any gross lesion or mucosal irregularity throughout the structures. The neck is negative for mass or lymphadenopathy. The trachea and parotid region are clear free of obvious mass or tumor. Facial structures are  grossly otherwise unremarkable as well.    Procedure:   Flexible laryngoscopy topical anesthesia reveals good cord mobility in appearance without evidence of obvious mass tumor etc.  There is no pooling of secretions.    Assessment/Plan   1.  Excessive mucus production.  2.  Intermittent coughing and choking related and likely #1 above.  3.  Evidence of facial swelling/fullness possible low-grade lipoma right facial region just lateral to the right lip region.    In light of the above we favor observation from the [facility].  Reassurance provided.  Certainly may utilize nasal steroids as a trial to see if it benefits him but I think maybe the most important issue is to find out if he can get off a lot of these medications which he personally believes is driving the phenomenon.  He will update me accordingly.  All questions were answered in this regard accordingly.

## 2025-07-31 ENCOUNTER — TELEPHONE (OUTPATIENT)
Dept: CARDIAC SURGERY | Facility: CLINIC | Age: 70
End: 2025-07-31
Payer: MEDICARE

## 2025-07-31 NOTE — TELEPHONE ENCOUNTER
I left a voicemail regarding the Holter monitor to be worn for 7 days this month that Dr. Barron discussed with him.  I need to verify his address and insurance so the monitor can be ordered and mailed to his home. I left the office number and asked for a return call.

## 2025-08-04 ENCOUNTER — TELEPHONE (OUTPATIENT)
Dept: CARDIAC SURGERY | Facility: CLINIC | Age: 70
End: 2025-08-04
Payer: MEDICARE

## 2025-08-04 NOTE — TELEPHONE ENCOUNTER
.I reminded patient about the Holter monitor to be worn for 7 days this month per their last visit with Dr. Barron in the a-fib clinic.  I explained everything about the Holter monitor and that it will be mailed to their home.  I verified the patients insurance and address to be correct in the system. I ordered the Holter monitor with the patients permission to do so.  I informed patient to ignore any message they might receive about a visit for the monitor to be placed because, that is just me ordering their monitor to be mailed to their home.

## 2025-08-05 ENCOUNTER — ANCILLARY PROCEDURE (OUTPATIENT)
Dept: CARDIAC SURGERY | Facility: CLINIC | Age: 70
End: 2025-08-05
Payer: MEDICARE

## 2025-08-05 DIAGNOSIS — I48.0 PAROXYSMAL A-FIB (MULTI): ICD-10-CM

## 2025-08-26 PROCEDURE — 93244 EXT ECG>48HR<7D REV&INTERPJ: CPT | Performed by: INTERNAL MEDICINE

## 2025-09-02 ENCOUNTER — TELEMEDICINE (OUTPATIENT)
Dept: CARDIAC SURGERY | Facility: CLINIC | Age: 70
End: 2025-09-02
Payer: MEDICARE

## 2025-09-02 DIAGNOSIS — I48.0 PAROXYSMAL ATRIAL FIBRILLATION (MULTI): Primary | ICD-10-CM

## 2025-09-02 PROCEDURE — 99215 OFFICE O/P EST HI 40 MIN: CPT | Performed by: THORACIC SURGERY (CARDIOTHORACIC VASCULAR SURGERY)

## 2025-09-02 PROCEDURE — 1159F MED LIST DOCD IN RCRD: CPT | Performed by: THORACIC SURGERY (CARDIOTHORACIC VASCULAR SURGERY)

## 2025-09-02 ASSESSMENT — LIFESTYLE VARIABLES
SKIP TO QUESTIONS 9-10: 1
HOW OFTEN DO YOU HAVE SIX OR MORE DRINKS ON ONE OCCASION: NEVER
AUDIT-C TOTAL SCORE: 0
HOW OFTEN DO YOU HAVE A DRINK CONTAINING ALCOHOL: NEVER
HOW MANY STANDARD DRINKS CONTAINING ALCOHOL DO YOU HAVE ON A TYPICAL DAY: PATIENT DOES NOT DRINK

## 2025-09-02 ASSESSMENT — ENCOUNTER SYMPTOMS
DEPRESSION: 0
OCCASIONAL FEELINGS OF UNSTEADINESS: 0
LOSS OF SENSATION IN FEET: 0

## 2025-09-03 DIAGNOSIS — I10 PRIMARY HYPERTENSION: ICD-10-CM

## 2025-09-03 RX ORDER — OLMESARTAN MEDOXOMIL 20 MG/1
20 TABLET ORAL DAILY
Qty: 90 TABLET | Refills: 0 | OUTPATIENT
Start: 2025-09-03

## 2025-10-13 ENCOUNTER — APPOINTMENT (OUTPATIENT)
Dept: CARDIOLOGY | Facility: CLINIC | Age: 70
End: 2025-10-13
Payer: MEDICARE

## 2025-10-14 ENCOUNTER — APPOINTMENT (OUTPATIENT)
Dept: PRIMARY CARE | Facility: CLINIC | Age: 70
End: 2025-10-14
Payer: MEDICARE

## 2025-10-27 ENCOUNTER — APPOINTMENT (OUTPATIENT)
Dept: CARDIOLOGY | Facility: CLINIC | Age: 70
End: 2025-10-27
Payer: MEDICARE

## (undated) DEVICE — DRAIN SURG 15FR 100% SIL RND END PERF

## (undated) DEVICE — PACK,SET UP,DRAPE: Brand: MEDLINE

## (undated) DEVICE — TROCAR: Brand: KII SHIELDED BLADED ACCESS SYSTEM

## (undated) DEVICE — TOWEL,OR,DSP,ST,BLUE,STD,4/PK,20PK/CS: Brand: MEDLINE

## (undated) DEVICE — Device: Brand: ENDO SMARTCAP

## (undated) DEVICE — Device

## (undated) DEVICE — BEAMER ARGON PROBE SINGLE MODE PROBE FOR FLEXIBLE ENDOSCOPY 2.3 MM X 320 CM: Brand: BEAMER

## (undated) DEVICE — ENDO CARRY-ON PROCEDURE KIT: Brand: ENDO CARRY-ON PROCEDURE KIT

## (undated) DEVICE — SYRINGE MED 10ML TRNSLUC BRL PLUNG BLK MRK POLYPR CTRL

## (undated) DEVICE — C-ARM: Brand: UNBRANDED

## (undated) DEVICE — COVER,MAYO STAND,STERILE: Brand: MEDLINE

## (undated) DEVICE — SUTURE MCRYL SZ 4-0 L27IN ABSRB UD L19MM PS-2 1/2 CIR PRIM Y426H

## (undated) DEVICE — RESERVOIR,SUCTION,100CC,SILICONE: Brand: MEDLINE

## (undated) DEVICE — KIT CHOLGM POLYUR W/ KARLAN BLLN CATH 4FR L60CM 5MM INTRO

## (undated) DEVICE — LABEL MED MINI W/ MARKER

## (undated) DEVICE — WARMER SCP 2 ANTIFOG LAP DISP

## (undated) DEVICE — COVER LT HNDL BLU PLAS

## (undated) DEVICE — TUBE SET 96 MM 64 MM H2O PERISTALTIC STD AUX CHANNEL

## (undated) DEVICE — ELECTRODE PT RET AD L9FT HI MOIST COND ADH HYDRGEL CORDED

## (undated) DEVICE — PATIENT RETURN ELECTRODE, SINGLE-USE, NON CONTACT QUALITY MONITORING, ADULT, WITH 9 FT (2.7 M) CORD, FOR PATIENTS WEIGHING OVER 33LBS. (15KG): Brand: MEGADYNE

## (undated) DEVICE — GOWN,AURORA,NONREINFORCED,LARGE: Brand: MEDLINE

## (undated) DEVICE — APPLIER CLP L SHFT DIA12MM 20 ROT MULT LIGACLP

## (undated) DEVICE — SPONGE,LAP,18"X18",DLX,XR,ST,5/PK,40/PK: Brand: MEDLINE

## (undated) DEVICE — SINGLE PORT MANIFOLD: Brand: NEPTUNE 2

## (undated) DEVICE — GLOVE ORANGE PI 8   MSG9080

## (undated) DEVICE — BRUSH ENDO CLN L90.5IN SHTH DIA1.7MM BRIST DIA5-7MM 2-6MM

## (undated) DEVICE — INTENDED FOR TISSUE SEPARATION, AND OTHER PROCEDURES THAT REQUIRE A SHARP SURGICAL BLADE TO PUNCTURE OR CUT.: Brand: BARD-PARKER ® CARBON RIB-BACK BLADES

## (undated) DEVICE — CONMED SCOPE SAVER BITE BLOCK, 20X27 MM: Brand: SCOPE SAVER

## (undated) DEVICE — TROCAR: Brand: KII FIOS FIRST ENTRY

## (undated) DEVICE — DRAPE EQUIP TRNSPRT CONTAINMENT FOR BK TAB

## (undated) DEVICE — BINDER ABD SM M H8XL30 45IN UNISX STD E 4 PNL DISPOSABLE

## (undated) DEVICE — TUBING, SUCTION, 1/4" X 10', STRAIGHT: Brand: MEDLINE

## (undated) DEVICE — COUNTER NDL 40 COUNT HLD 70 FOAM BLK ADH W/ MAG

## (undated) DEVICE — KIT,ANTI FOG,W/SPONGE & FLUID,SOFT PACK: Brand: MEDLINE

## (undated) DEVICE — DRAPE,LAP,CHOLE,W/TROUGHS,STERILE: Brand: MEDLINE

## (undated) DEVICE — BANDAGE ADH W2XL4IN NITRL FAB STRP CURAD

## (undated) DEVICE — BAG SPEC REM 224ML W4XL6IN DIA10MM 1 HND GYN DISP ENDOPCH

## (undated) DEVICE — NEEDLE INSUF L120MM ULT VERES ENDOPATH

## (undated) DEVICE — ADHESIVE SKIN CLSR 0.7ML TOP DERMBND ADV

## (undated) DEVICE — SUTURE PROL SZ 3-0 L30IN NONABSORBABLE BLU L30MM FS-1 3/8 8675H

## (undated) DEVICE — GOWN,PREVENTION PLUS,XLN/2XL,ST,22/CS: Brand: MEDLINE

## (undated) DEVICE — SUTURE VCRL SZ 3-0 L27IN ABSRB UD L26MM SH 1/2 CIR J416H

## (undated) DEVICE — Z DUPLICATE USE 2431315 SET INSUF TBNG HI FLO W/ SMK EVAC FOR PNEUMOCLEAR

## (undated) DEVICE — APPLICATOR MEDICATED 26 CC SOLUTION HI LT ORNG CHLORAPREP

## (undated) DEVICE — TROCAR: Brand: KII® SLEEVE